# Patient Record
Sex: MALE | Race: WHITE | NOT HISPANIC OR LATINO
[De-identification: names, ages, dates, MRNs, and addresses within clinical notes are randomized per-mention and may not be internally consistent; named-entity substitution may affect disease eponyms.]

---

## 2017-03-02 ENCOUNTER — APPOINTMENT (OUTPATIENT)
Dept: VASCULAR SURGERY | Facility: CLINIC | Age: 67
End: 2017-03-02

## 2017-12-05 ENCOUNTER — INPATIENT (INPATIENT)
Facility: HOSPITAL | Age: 67
LOS: 3 days | Discharge: HOME | DRG: 242 | End: 2017-12-09
Attending: INTERNAL MEDICINE | Admitting: INTERNAL MEDICINE
Payer: COMMERCIAL

## 2017-12-05 VITALS
WEIGHT: 177.91 LBS | HEIGHT: 69 IN | RESPIRATION RATE: 18 BRPM | SYSTOLIC BLOOD PRESSURE: 143 MMHG | DIASTOLIC BLOOD PRESSURE: 62 MMHG | TEMPERATURE: 98 F | HEART RATE: 38 BPM | OXYGEN SATURATION: 95 %

## 2017-12-05 DIAGNOSIS — Z29.9 ENCOUNTER FOR PROPHYLACTIC MEASURES, UNSPECIFIED: ICD-10-CM

## 2017-12-05 DIAGNOSIS — I44.2 ATRIOVENTRICULAR BLOCK, COMPLETE: ICD-10-CM

## 2017-12-05 DIAGNOSIS — N40.0 BENIGN PROSTATIC HYPERPLASIA WITHOUT LOWER URINARY TRACT SYMPTOMS: ICD-10-CM

## 2017-12-05 DIAGNOSIS — Z98.89 OTHER SPECIFIED POSTPROCEDURAL STATES: Chronic | ICD-10-CM

## 2017-12-05 DIAGNOSIS — R63.8 OTHER SYMPTOMS AND SIGNS CONCERNING FOOD AND FLUID INTAKE: ICD-10-CM

## 2017-12-05 LAB
ALBUMIN SERPL ELPH-MCNC: 4.7 G/DL — SIGNIFICANT CHANGE UP (ref 3.3–5)
ALP SERPL-CCNC: 62 U/L — SIGNIFICANT CHANGE UP (ref 40–120)
ALT FLD-CCNC: 58 U/L — HIGH (ref 10–45)
ANION GAP SERPL CALC-SCNC: 12 MMOL/L — SIGNIFICANT CHANGE UP (ref 5–17)
APTT BLD: 28.5 SEC — SIGNIFICANT CHANGE UP (ref 27.5–37.4)
AST SERPL-CCNC: 34 U/L — SIGNIFICANT CHANGE UP (ref 10–40)
BASOPHILS NFR BLD AUTO: 0.2 % — SIGNIFICANT CHANGE UP (ref 0–2)
BILIRUB SERPL-MCNC: 0.8 MG/DL — SIGNIFICANT CHANGE UP (ref 0.2–1.2)
BUN SERPL-MCNC: 21 MG/DL — SIGNIFICANT CHANGE UP (ref 7–23)
CALCIUM SERPL-MCNC: 9.3 MG/DL — SIGNIFICANT CHANGE UP (ref 8.4–10.5)
CHLORIDE SERPL-SCNC: 104 MMOL/L — SIGNIFICANT CHANGE UP (ref 96–108)
CK MB CFR SERPL CALC: 2 NG/ML — SIGNIFICANT CHANGE UP (ref 0–6.7)
CK SERPL-CCNC: 107 U/L — SIGNIFICANT CHANGE UP (ref 30–200)
CO2 SERPL-SCNC: 25 MMOL/L — SIGNIFICANT CHANGE UP (ref 22–31)
CREAT SERPL-MCNC: 0.99 MG/DL — SIGNIFICANT CHANGE UP (ref 0.5–1.3)
EOSINOPHIL NFR BLD AUTO: 1.3 % — SIGNIFICANT CHANGE UP (ref 0–6)
GLUCOSE SERPL-MCNC: 133 MG/DL — HIGH (ref 70–99)
HCT VFR BLD CALC: 45.2 % — SIGNIFICANT CHANGE UP (ref 39–50)
HGB BLD-MCNC: 15.3 G/DL — SIGNIFICANT CHANGE UP (ref 13–17)
INR BLD: 1.11 — SIGNIFICANT CHANGE UP (ref 0.88–1.16)
LYMPHOCYTES # BLD AUTO: 15 % — SIGNIFICANT CHANGE UP (ref 13–44)
MAGNESIUM SERPL-MCNC: 1.8 MG/DL — SIGNIFICANT CHANGE UP (ref 1.6–2.6)
MCHC RBC-ENTMCNC: 31.6 PG — SIGNIFICANT CHANGE UP (ref 27–34)
MCHC RBC-ENTMCNC: 33.8 G/DL — SIGNIFICANT CHANGE UP (ref 32–36)
MCV RBC AUTO: 93.4 FL — SIGNIFICANT CHANGE UP (ref 80–100)
MONOCYTES NFR BLD AUTO: 8 % — SIGNIFICANT CHANGE UP (ref 2–14)
NEUTROPHILS NFR BLD AUTO: 75.5 % — SIGNIFICANT CHANGE UP (ref 43–77)
PCP SPEC-MCNC: SIGNIFICANT CHANGE UP
PLATELET # BLD AUTO: 120 K/UL — LOW (ref 150–400)
POTASSIUM SERPL-MCNC: 4 MMOL/L — SIGNIFICANT CHANGE UP (ref 3.5–5.3)
POTASSIUM SERPL-SCNC: 4 MMOL/L — SIGNIFICANT CHANGE UP (ref 3.5–5.3)
PROT SERPL-MCNC: 7.6 G/DL — SIGNIFICANT CHANGE UP (ref 6–8.3)
PROTHROM AB SERPL-ACNC: 12.3 SEC — SIGNIFICANT CHANGE UP (ref 9.8–12.7)
RBC # BLD: 4.84 M/UL — SIGNIFICANT CHANGE UP (ref 4.2–5.8)
RBC # FLD: 13.5 % — SIGNIFICANT CHANGE UP (ref 10.3–16.9)
SODIUM SERPL-SCNC: 141 MMOL/L — SIGNIFICANT CHANGE UP (ref 135–145)
TROPONIN T SERPL-MCNC: <0.01 NG/ML — SIGNIFICANT CHANGE UP (ref 0–0.01)
TSH SERPL-MCNC: 1.85 UIU/ML — SIGNIFICANT CHANGE UP (ref 0.35–4.94)
WBC # BLD: 8.9 K/UL — SIGNIFICANT CHANGE UP (ref 3.8–10.5)
WBC # FLD AUTO: 8.9 K/UL — SIGNIFICANT CHANGE UP (ref 3.8–10.5)

## 2017-12-05 PROCEDURE — 71250 CT THORAX DX C-: CPT | Mod: 26

## 2017-12-05 PROCEDURE — 93010 ELECTROCARDIOGRAM REPORT: CPT

## 2017-12-05 PROCEDURE — 71010: CPT | Mod: 26

## 2017-12-05 PROCEDURE — 99291 CRITICAL CARE FIRST HOUR: CPT | Mod: 25

## 2017-12-05 PROCEDURE — 99291 CRITICAL CARE FIRST HOUR: CPT

## 2017-12-05 PROCEDURE — 70490 CT SOFT TISSUE NECK W/O DYE: CPT | Mod: 26

## 2017-12-05 RX ORDER — ACETAMINOPHEN 500 MG
650 TABLET ORAL EVERY 6 HOURS
Qty: 0 | Refills: 0 | Status: DISCONTINUED | OUTPATIENT
Start: 2017-12-05 | End: 2017-12-09

## 2017-12-05 RX ORDER — DOPAMINE HYDROCHLORIDE 40 MG/ML
5 INJECTION, SOLUTION, CONCENTRATE INTRAVENOUS
Qty: 400 | Refills: 0 | Status: DISCONTINUED | OUTPATIENT
Start: 2017-12-05 | End: 2017-12-05

## 2017-12-05 RX ORDER — HEPARIN SODIUM 5000 [USP'U]/ML
5000 INJECTION INTRAVENOUS; SUBCUTANEOUS EVERY 8 HOURS
Qty: 0 | Refills: 0 | Status: DISCONTINUED | OUTPATIENT
Start: 2017-12-05 | End: 2017-12-06

## 2017-12-05 RX ORDER — ATROPINE SULFATE 0.1 MG/ML
0.5 SYRINGE (ML) INJECTION ONCE
Qty: 0 | Refills: 0 | Status: DISCONTINUED | OUTPATIENT
Start: 2017-12-05 | End: 2017-12-05

## 2017-12-05 RX ADMIN — Medication 0.5 MILLIGRAM(S): at 18:41

## 2017-12-05 RX ADMIN — HEPARIN SODIUM 5000 UNIT(S): 5000 INJECTION INTRAVENOUS; SUBCUTANEOUS at 21:37

## 2017-12-05 RX ADMIN — DOPAMINE HYDROCHLORIDE 15.13 MICROGRAM(S)/KG/MIN: 40 INJECTION, SOLUTION, CONCENTRATE INTRAVENOUS at 17:26

## 2017-12-05 RX ADMIN — Medication 0.5 MILLIGRAM(S): at 21:36

## 2017-12-05 NOTE — PROCEDURE NOTE - ADDITIONAL PROCEDURE DETAILS
Post procedure, Pacing temporarily turned off, no evidence of escape rhythm. Patient had transient (<10 secs) pause, capture achieved with pacing immediately, mild hypoxia and diaphoresis. NRBM placed, Symptoms and hemodynamics improved in short duration. CXR and chest CT non contrast done, confirmed no PTX. Care transferred to night float team.

## 2017-12-05 NOTE — H&P ADULT - ATTENDING COMMENTS
See CCU resident admission note for additional details   67 year old male with Osteoarthritis, HCV s/p therapy with no active disease and Cirrhosis who presents with subacute dyspnea, fatigue, dizziness with acute worsening in past 24 hours. Pt found to be in CHB on intake EKG with low junctional escape in 30s. Patient admitted to CCU for further management.   Vitals, labs and imaging reviewed including serial EKGs 12/5  TVP placed  Pace at VVI 80  Formal TTE  TFTs order, no lyme risk factors, not on padma agents  EP consulted for eval for PPM  Pt to remain in CCU for transvenous pacing while awaiting PPM placement

## 2017-12-05 NOTE — H&P ADULT - PROBLEM SELECTOR PLAN 2
-No IVF indicated  -Will replete to K>4 and Mg>2  -  -Full Code  -[facility] -HSQ  -No GI prophylaxis

## 2017-12-05 NOTE — H&P ADULT - PROBLEM SELECTOR PLAN 3
-No IVF indicated  -Will replete to K>4 and Mg>2  -  -Full Code  -[facility] -No IVF indicated  -Will replete to K>4 and Mg>2  -DASH/TLC, NPO after midnight for PPM treatment  -Full Code  -[facility]po [facility]

## 2017-12-05 NOTE — H&P ADULT - NSHPLABSRESULTS_GEN_ALL_CORE
15.3   8.9   )-----------( 120      ( 05 Dec 2017 17:01 )             45.2     12-05    141  |  104  |  21  ----------------------------<  133<H>  4.0   |  25  |  0.99    Ca    9.3      05 Dec 2017 17:01  Mg     1.8     12-05    TPro  7.6  /  Alb  4.7  /  TBili  0.8  /  DBili  x   /  AST  34  /  ALT  58<H>  /  AlkPhos  62  12-05    LIVER FUNCTIONS - ( 05 Dec 2017 17:01 )  Alb: 4.7 g/dL / Pro: 7.6 g/dL / ALK PHOS: 62 U/L / ALT: 58 U/L / AST: 34 U/L / GGT: x           PT/INR - ( 05 Dec 2017 17:01 )   PT: 12.3 sec;   INR: 1.11          PTT - ( 05 Dec 2017 17:01 )  PTT:28.5 sec    CARDIAC MARKERS ( 05 Dec 2017 17:01 )  x     / <0.01 ng/mL / 107 U/L / x     / 2.0 ng/mL

## 2017-12-05 NOTE — H&P ADULT - NSHPPHYSICALEXAM_GEN_ALL_CORE
Constitutional: WDWN resting comfortably in bed; NAD  Head: NC/AT  Eyes: PERRL, EOMI, anicteric sclera  ENT: no nasal discharge; uvula midline, no oropharyngeal erythema or exudates; MMM  Neck: supple; no JVD or thyromegaly  Respiratory: CTA B/L; no W/R/R, no retractions  Cardiac: +S1/S2; RRR; no M/R/G; PMI non-displaced  Gastrointestinal: soft, NT/ND; no rebound or guarding; +BSx4  Extremities: WWP, no clubbing or cyanosis; no peripheral edema  Musculoskeletal: NROM x4; no joint swelling, tenderness or erythema  Vascular: 2+ radial, femoral, DP/PT pulses B/L  Neurologic: AAOx3; CNII-XII grossly intact; no focal deficits Constitutional: WDWN resting comfortably in bed; NAD  Head: NC/AT  Eyes: PERRL, EOMI, anicteric sclera  ENT: no nasal discharge; absence of uvula, no oropharyngeal erythema or exudates; MMM  Neck: supple; no JVD or thyromegaly  Respiratory: Patient rhonchorous throughout lung fields  Cardiac: +S1/S2; compelte heart block, no M/R/G  Gastrointestinal: soft, NT/ND; no rebound or guarding; +BSx4  Extremities: WWP, no clubbing or cyanosis; no peripheral edema  Musculoskeletal: NROM x4; no joint swelling, tenderness or erythema  Vascular: 2+ radial, femoral, DP/PT pulses B/L  Neurologic: AAOx3; CNII-XII grossly intact; no focal deficits

## 2017-12-05 NOTE — CONSULT NOTE ADULT - ASSESSMENT
66yo M with high degree AV block    - Admit to CCU  - Place TVP for high degree AV block  - NPO after midnight for possible PPM insertion on Weds 12/6  - Echo, labs, lytes, TSH, HbA1c  - Collateral info from Dr. Alvarado  - D/c Dopamine drip once TVP is placed  - EP will continue to follow 66yo M with non-contributory medical history found to be in complete heart block. Will be admitted to CCU.     - Admit to CCU  - Place TVP for CHB  - NPO after midnight for possible PPM insertion on Weds 12/6  - Echo, labs, lytes, TSH, HbA1c  - Collateral info from Dr. Alvarado  - D/c Dopamine drip once TVP is placed  - EP will continue to follow 67o M with PMH of arthritis, Hep C (resolved), and long-standing productive cough who presented to [facility] ED today with symptoms of palpitations, dizziness, near syncope. Patient was found to be in complete heart block with wide junctional escape, HR 35-40bpm.    - Admit to CCU  - Place TVP  - D/c Dopamine drip once TVP is placed  - NPO after midnight for PPM insertion on Weds 12/6  - Echocardiogram, labs, lytes, TSH, HbA1c  - Collateral info from Dr. Alvarado   - EP will continue to follow

## 2017-12-05 NOTE — H&P ADULT - ASSESSMENT
Patient is a 67 year old male with PMH Patient is a 67 year old male with PMH arthritis, hepatitis C (patient unaware of reason for infection) with residual cirrhosis and long standing productive cough with copious sputum production releived by mucinex who presents to [facility] for irregular heartbeat found to be in complete heart block.

## 2017-12-05 NOTE — ED PROVIDER NOTE - OBJECTIVE STATEMENT
66 yo , reports intermittent lightheaded episodes for a few months, Pt with 2 episodes of palpitations and the sensation of a rapid heart rate over the last 2 days. Patient sent by pcp to cardiologist today, found to have a high grade block, Patient sent here for admission with ultimate pacemaker, no chest pain, occasional mild SOB, no fever, no cough, no abd pain, no n/v/d/

## 2017-12-05 NOTE — ED PROVIDER NOTE - MEDICAL DECISION MAKING DETAILS
66 yo with high grade AV block, will need emergent pacemaker, held on atropine / pacing as pt asymptomatic at rest and nl B.P. , cards saw pt in ED and recommended initiating dopamine, sent stat to CCU for intervention.

## 2017-12-05 NOTE — H&P ADULT - NSHPREVIEWOFSYSTEMS_GEN_ALL_CORE
CONSTITUTIONAL: No weakness, fevers or chills  EYES/ENT: No visual changes;  No vertigo or throat pain   NECK: No pain or stiffness  RESPIRATORY: No cough, wheezing, hemoptysis; No shortness of breath  CARDIOVASCULAR: No chest pain or palpitations  GASTROINTESTINAL: No abdominal or epigastric pain. No nausea, vomiting, or hematemesis; No diarrhea or constipation. No melena or hematochezia.  GENITOURINARY: No dysuria, frequency or hematuria  NEUROLOGICAL: No numbness or weakness  SKIN: No itching, burning, rashes, or lesions   All other review of systems is negative unless indicated above. CONSTITUTIONAL: No weakness, fevers or chills, admits to light headedness and dizziness  EYES/ENT: No visual changes;  No vertigo or throat pain   NECK: No pain or stiffness  RESPIRATORY: Admits to productive cough and shortness of breath, denies wheezing, hemoptysis  CARDIOVASCULAR: Denies chest pain admits to palpitations  GASTROINTESTINAL: No abdominal or epigastric pain. No nausea, vomiting, or hematemesis; No diarrhea or constipation. No melena or hematochezia.  GENITOURINARY: No dysuria, frequency or hematuria  NEUROLOGICAL: No numbness or weakness  SKIN: No itching, burning, rashes, or lesions

## 2017-12-05 NOTE — ED ADULT NURSE NOTE - OBJECTIVE STATEMENT
Pt c/o palpitations earlier while tying his shoes, states he contacted his doctor and saw Dr. Manrique and sent to ED. Pt states he had a similar episode months ago and saw a doctor with no diagnosis. Pt denies chest pain, SOB. Pt ambulating steadily, A&Ox3, speaking clearly, in no distress. Pt denies any significant medical problems, not on any daily medications. Pt on cardiac monitor and pacer pads via portable monitor on bed. Continuing to monitor.

## 2017-12-05 NOTE — CONSULT NOTE ADULT - SUBJECTIVE AND OBJECTIVE BOX
HPI: Patient is 67o M with PMH of arthritis, Hep C (resolved, s/p treatment), and long-standing productive cough with copious mucus (relieved by Mucinex). Patient endorses intermittent dizziness and near syncope over the past several months. Patient was in his usual state of health until yesterday when he experienced palpitations, severe dizziness, near syncope, SOB, and JUAREZ. Patient presented to Dr. Manrique's office where EKG showed 2:1 AV block with HR 35-40bpm. Dr. Manrique sent patient to the [facility] ER. EP was consulted for AV block and possible pacemaker.     Patient denies family history of cancer, heart disease, or sudden cardiac death.     In ED: /66. HR 35-40bpm. EKG demonstrated High grade AV block with wide junctional escape (> 130ms). Patient endorses fatigue, "not feeling right", and dizziness/ lightheadedness.     PAST MEDICAL & SURGICAL HISTORY:  Nephrolithiasis  S/P right knee surgery  s/p shoulder arthroscope  s/p Uvula removal 30 years ago          Social History: Patient is   Smoking: Former social smoker, no current   Drugs: Denies current drug use  ETOH: Occasional social Etoh, (beer and wine)    pertinent home medications:    Inpatient Medications:   DOPamine Infusion 5 MICROgram(s)/kG/Min IV Continuous <Continuous>      penicillin (Other)  predniSONE (Rash)      ROS:   CONSTITUTIONAL: No fever, weight loss + fatigue  EYES: Pt denies  RESPIRATORY: + productive cough with copious mucus (long-standing, not a new cough). Denies wheezing, chills or hemoptysis;  CARDIOVASCULAR: +JUAREZ. see HPI  GASTROINTESTINAL: Pt denies  NEUROLOGICAL: Pt denies  SKIN: Pt denies   PSYCHIATRIC: Pt denies  HEME/LYMPH: Pt denies    PHYSICAL:  T(C): 36.6 (12-05-17 @ 16:19), Max: 36.6 (12-05-17 @ 16:19)  HR: 38 (12-05-17 @ 16:19) (38 - 38)  BP: 143/62 (12-05-17 @ 16:19) (143/62 - 143/62)  RR: 18 (12-05-17 @ 16:19) (18 - 18)  SpO2: 95% (12-05-17 @ 16:19) (95% - 95%)  Daily Height in cm: 175.26 (05 Dec 2017 16:19)      Appearance: NAD, pleasant, conversant  Cardiovascular: High degree AV block, Normal S1 S2, No JVD, No murmurs, No edema  Respiratory: Lungs clear to auscultation bilaterally.  No wheeze, rhonchi, rales  Gastrointestinal:  Soft, NT/ND, + BS	  Neurologic: A&O x3, No deficit noted  Extremities: WWP, No edema, pulses intact      LABS:                        15.3   8.9   )-----------( 120      ( 05 Dec 2017 17:01 )             45.2     PT/INR - ( 05 Dec 2017 17:01 )   PT: 12.3 sec;   INR: 1.11          PTT - ( 05 Dec 2017 17:01 )  PTT:28.5 sec            EKG: High degree AV block with wide junctional escape, HR 37    Telemetry:  High degree AV block with wide junctional escape, HR 35-40bpm    ECHO: Bedside echo normal EF    Prior EP procedures: None    Assessment/ Plan: HPI: Patient is 67o M with PMH of arthritis, Hep C (resolved, s/p treatment), and long-standing productive cough with copious mucus (relieved by Mucinex). Patient endorses intermittent dizziness and near syncope over the past several months. Patient was in his usual state of health until yesterday when he experienced palpitations, severe dizziness, near syncope, SOB, and JUAREZ. Patient presented to Dr. Manrique's office where EKG showed complete heart block, HR 35-40bpm. Dr. Manrique sent patient to the [facility] ER. EP was consulted for AV block and possible pacemaker.     Patient denies family history of cancer, heart disease, or sudden cardiac death.     In ED: /66. HR 35-40bpm. EKG demonstrated CHB with wide junctional escape (> 130ms). Patient endorses fatigue, "not feeling right", and dizziness/ lightheadedness.     PAST MEDICAL & SURGICAL HISTORY:  Nephrolithiasis  S/P right knee surgery  s/p shoulder arthroscope  s/p Uvula removal 30 years ago          Social History: Patient is   Smoking: Former social smoker, no current   Drugs: Denies current drug use  ETOH: Occasional social Etoh, (beer and wine)    pertinent home medications:    Inpatient Medications:   DOPamine Infusion 5 MICROgram(s)/kG/Min IV Continuous <Continuous>      penicillin (Other)  predniSONE (Rash)      ROS:   CONSTITUTIONAL: No fever, weight loss + fatigue  EYES: Pt denies  RESPIRATORY: + productive cough with copious mucus (long-standing, not a new cough). Denies wheezing, chills or hemoptysis;  CARDIOVASCULAR: +JUAREZ. see HPI  GASTROINTESTINAL: Pt denies  NEUROLOGICAL: Pt denies  SKIN: Pt denies   PSYCHIATRIC: Pt denies  HEME/LYMPH: Pt denies    PHYSICAL:  T(C): 36.6 (12-05-17 @ 16:19), Max: 36.6 (12-05-17 @ 16:19)  HR: 38 (12-05-17 @ 16:19) (38 - 38)  BP: 143/62 (12-05-17 @ 16:19) (143/62 - 143/62)  RR: 18 (12-05-17 @ 16:19) (18 - 18)  SpO2: 95% (12-05-17 @ 16:19) (95% - 95%)  Daily Height in cm: 175.26 (05 Dec 2017 16:19)      Appearance: NAD, pleasant, conversant  Cardiovascular: complete heart block, Normal S1 S2, No JVD, No murmurs, No edema  Respiratory: Lungs clear to auscultation bilaterally.  No wheeze, rhonchi, rales  Gastrointestinal:  Soft, NT/ND, + BS	  Neurologic: A&O x3, No deficit noted  Extremities: WWP, No edema, pulses intact      LABS:                        15.3   8.9   )-----------( 120      ( 05 Dec 2017 17:01 )             45.2     PT/INR - ( 05 Dec 2017 17:01 )   PT: 12.3 sec;   INR: 1.11          PTT - ( 05 Dec 2017 17:01 )  PTT:28.5 sec            EKG: High degree AV block with wide junctional escape, HR 37    Telemetry:  High degree AV block with wide junctional escape, HR 35-40bpm    ECHO: Bedside echo normal EF    Prior EP procedures: None    Assessment/ Plan: HPI: Patient is 67o M with PMH of arthritis, Hep C (resolved, s/p treatment), uvula removal (30yrs ago), and long-standing productive cough with copious mucus (relieved by Mucinex). Patient takes tumeric curcumin, no prescriptions medications. He endorses regular doctors visits with internist, GI, and cardiology. He endorses intermittent dizziness and near syncope over the past several months. Patient was in his usual state of health until yesterday when he began to experience palpitations, severe dizziness, near syncope, SOB, and JUAREZ. Patient presented to Dr. Manrique's office where EKG showed complete heart block, HR 35-40bpm. Dr. Manrique sent patient to the [facility] ER. EP was consulted for heart block and possible pacemaker.     Patient denies family history of cancer, heart disease, or sudden cardiac death. Patient endorses occasional use of PDE-5 inhibitors (most recently one week ago). Patient requested this not be discussed with anyone but him.     In ED: /66. HR 35-40bpm. O2: 95-99%. EKG demonstrated CHB with wide junctional escape (> 130ms). Patient endorses fatigue, "not feeling right", and dizziness/ lightheadedness.     PAST MEDICAL & SURGICAL HISTORY:  Nephrolithiasis  S/P right knee surgery  s/p shoulder arthroscope  s/p Uvula removal 30 years ago    Social History: Patient is   Smoking: Former social smoker, no current   Drugs: Denies current drug use  ETOH: Occasional social Etoh, (beer and wine)    pertinent home medications:    Inpatient Medications:   DOPamine Infusion 5 MICROgram(s)/kG/Min IV Continuous <Continuous>      penicillin (Other)  predniSONE (Rash)      ROS:   CONSTITUTIONAL: No fever, weight loss +fatigue  EYES: Pt denies  RESPIRATORY: +productive cough with copious mucus (long-standing, not a new cough). Denies wheezing, chills or hemoptysis;  CARDIOVASCULAR: +JUAREZ. see HPI  GASTROINTESTINAL: Pt denies  NEUROLOGICAL: Pt denies  SKIN: Pt denies   PSYCHIATRIC: Pt denies  HEME/LYMPH: Pt denies    PHYSICAL:  T(C): 36.6 (12-05-17 @ 16:19), Max: 36.6 (12-05-17 @ 16:19)  HR: 38 (12-05-17 @ 16:19) (38 - 38)  BP: 143/62 (12-05-17 @ 16:19) (143/62 - 143/62)  RR: 18 (12-05-17 @ 16:19) (18 - 18)  SpO2: 95% (12-05-17 @ 16:19) (95% - 95%)  Daily Height in cm: 175.26 (05 Dec 2017 16:19)      Appearance: NAD, pleasant, conversant  Cardiovascular: complete heart block, Normal S1 S2, No JVD, No murmurs, No edema  Respiratory: Lungs clear to auscultation bilaterally.  No wheeze, rhonchi, rales  Gastrointestinal:  Soft, NT/ ND, + BS	  Neurologic: A&O x3, No deficit noted  Extremities: WWP, No edema, pulses intact      LABS:                        15.3   8.9   )-----------( 120      ( 05 Dec 2017 17:01 )             45.2     PT/INR - ( 05 Dec 2017 17:01 )   PT: 12.3 sec;   INR: 1.11     PTT - ( 05 Dec 2017 17:01 )  PTT:28.5 sec            EKG: High degree AV block with wide junctional escape, HR 37    Telemetry:  High degree AV block with wide junctional escape, HR 35-40bpm    ECHO: Bedside echo normal EF    Prior EP procedures: None    Assessment/ Plan: HPI: Patient is 67o M with PMH of arthritis, Hep C (resolved, s/p treatment), uvula removal (30yrs ago), and long-standing productive cough with copious mucus (relieved by Mucinex). Patient takes tumeric curcumin, no prescriptions medications. He endorses regular doctors visits with internist, GI, and cardiology. He endorses intermittent dizziness and near syncope over the past several months. Patient was in his usual state of health until yesterday when he began to experience palpitations, severe dizziness, near syncope, SOB, and JUAREZ. Patient presented to Dr. Manrique's office where EKG showed complete heart block, HR 35-40bpm. Dr. Manrique sent patient to the [facility] ER. EP was consulted for heart block and possible pacemaker.     Patient denies family history of cancer, heart disease, or sudden cardiac death. Patient endorses occasional use of PDE-5 inhibitors (most recently one week ago). Patient requested this not be discussed with anyone but him.     In ED: /68. HR 35-40bpm. O2: 95-99%. EKG demonstrated CHB with wide junctional escape (> 130ms). Patient endorses fatigue, "not feeling right", and dizziness/ lightheadedness.     PAST MEDICAL & SURGICAL HISTORY:  Nephrolithiasis  S/P right knee surgery  s/p shoulder arthroscope  s/p Uvula removal 30 years ago    Social History: Patient is   Smoking: Former social smoker, no current   Drugs: Denies current drug use  ETOH: Occasional social Etoh, (beer and wine)    pertinent home medications:    Inpatient Medications:   DOPamine Infusion 5 MICROgram(s)/kG/Min IV Continuous <Continuous>      penicillin (Other)  predniSONE (Rash)      ROS:   CONSTITUTIONAL: No fever, weight loss +fatigue  EYES: Pt denies  RESPIRATORY: +productive cough with copious mucus (long-standing, not a new cough). Denies wheezing, chills or hemoptysis;  CARDIOVASCULAR: +JUAREZ. see HPI  GASTROINTESTINAL: Pt denies  NEUROLOGICAL: Pt denies  SKIN: Pt denies   PSYCHIATRIC: Pt denies  HEME/LYMPH: Pt denies    PHYSICAL:  T(C): 36.6 (12-05-17 @ 16:19), Max: 36.6 (12-05-17 @ 16:19)  HR: 38 (12-05-17 @ 16:19) (38 - 38)  BP: 143/62 (12-05-17 @ 16:19) (143/62 - 143/62)  RR: 18 (12-05-17 @ 16:19) (18 - 18)  SpO2: 95% (12-05-17 @ 16:19) (95% - 95%)  Daily Height in cm: 175.26 (05 Dec 2017 16:19)      Appearance: NAD, pleasant, conversant  Cardiovascular: complete heart block, Normal S1 S2, No JVD, No murmurs, No edema  Respiratory: Lungs clear to auscultation bilaterally.  No wheeze, rhonchi, rales  Gastrointestinal:  Soft, NT/ ND, + BS	  Neurologic: A&O x3, No deficit noted  Extremities: WWP, No edema, pulses intact      LABS:                        15.3   8.9   )-----------( 120      ( 05 Dec 2017 17:01 )             45.2     PT/INR - ( 05 Dec 2017 17:01 )   PT: 12.3 sec;   INR: 1.11     PTT - ( 05 Dec 2017 17:01 )  PTT:28.5 sec            EKG: High degree AV block with wide junctional escape, HR 37    Telemetry: CHB with wide junctional escape, HR 35-40bpm    ECHO: Bedside echo normal EF    Prior EP procedures: None    Assessment/ Plan: HPI: Patient is 67o M with PMH of arthritis, Hep C (resolved, s/p treatment), uvula removal (30yrs ago), and long-standing productive cough with copious mucus (relieved by Mucinex). Patient takes tumeric curcumin, no prescriptions medications. He endorses regular doctors visits with internist, GI, and cardiology. He endorses intermittent dizziness and near syncope over the past several months. Patient was in his usual state of health until yesterday when he began to experience palpitations, severe dizziness, near syncope, SOB, and JUAREZ. Patient presented to Dr. Manrique's office where EKG showed complete heart block, HR 35-40bpm. Dr. Manrique sent patient to the [facility] ER. EP was consulted for heart block and possible pacemaker.     Patient denies family history of cancer, heart disease, or sudden cardiac death. Patient endorses occasional use of PDE-5 inhibitors (most recently one week ago). Patient requested this not be discussed with anyone but him.     In ED: /68. HR 35-40bpm. O2: 95-99%. EKG demonstrated CHB with wide junctional escape (> 130ms). Patient endorses fatigue, "not feeling right", and dizziness/ lightheadedness.     PAST MEDICAL & SURGICAL HISTORY:  Nephrolithiasis  S/P right knee surgery  s/p shoulder arthroscope  s/p Uvula removal 30 years ago    Social History: Patient is   Smoking: Former social smoker, no current   Drugs: Denies current drug use  ETOH: Occasional social Etoh, (beer and wine)    pertinent home medications:    Inpatient Medications:   DOPamine Infusion 5 MICROgram(s)/kG/Min IV Continuous <Continuous>      penicillin (Other)  predniSONE (Rash)      ROS:   CONSTITUTIONAL: No fever, weight loss +fatigue  EYES: Pt denies  RESPIRATORY: +productive cough with copious mucus (long-standing, not a new cough). Denies wheezing, chills or hemoptysis;  CARDIOVASCULAR: +JUAREZ. see HPI  GASTROINTESTINAL: Pt denies  NEUROLOGICAL: Pt denies  SKIN: Pt denies   PSYCHIATRIC: Pt denies  HEME/LYMPH: Pt denies    PHYSICAL:  T(C): 36.6 (12-05-17 @ 16:19), Max: 36.6 (12-05-17 @ 16:19)  HR: 38 (12-05-17 @ 16:19) (38 - 38)  BP: 143/62 (12-05-17 @ 16:19) (143/62 - 143/62)  RR: 18 (12-05-17 @ 16:19) (18 - 18)  SpO2: 95% (12-05-17 @ 16:19) (95% - 95%)  Daily Height in cm: 175.26 (05 Dec 2017 16:19)      Appearance: NAD, pleasant, conversant  Cardiovascular: complete heart block, Normal S1 S2, No JVD, No murmurs, No edema  Respiratory: Lungs clear to auscultation bilaterally.  No wheeze, rhonchi, rales  Gastrointestinal:  Soft, NT/ ND, + BS	  Neurologic: A&O x3, No deficit noted  Extremities: WWP, No edema, pulses intact      LABS:                        15.3   8.9   )-----------( 120      ( 05 Dec 2017 17:01 )             45.2     PT/INR - ( 05 Dec 2017 17:01 )   PT: 12.3 sec;   INR: 1.11     PTT - ( 05 Dec 2017 17:01 )  PTT:28.5 sec            EKG: CHB with wide junctional escape, HR 37    Telemetry: CHB with wide junctional escape, HR 35-40bpm    ECHO: Bedside echo normal EF    Prior EP procedures: None    Assessment/ Plan:

## 2017-12-05 NOTE — H&P ADULT - HISTORY OF PRESENT ILLNESS
Patient is a 67 year old male with PMH Patient is a 67 year old male with PMH who presents for irregular heartbeat.  He was at Dr. Manrique's office complaining of dyspnea on exertion and dizziness.    On arrival to [facility], his vitals signs were HR 38, /62, Temp 97.9 F and RR 18 and saturating 95%.  He was found to have heart block. Patient is a 67 year old male with PMH arthritis, hepatitis C (patient unaware of reason for infection) with residual cirrhosis and long standing productive cough with copious sputum production releived by mucinex who presents to [facility] for irregular heartbeat found to be in complete heart block.  The patient endorses 4-5 months of dizziness and near syncope feeling but no syncopal episodes.  He was in his usual state of health until yesterday when he started to have palpitations, severe dizziness, near syncope, shortness of breath and dyspnea on exertion.  Today he went today he was at Dr. Manrique's office for the first time and on EKG was found to have complete heart block with HRs in the 30s and 40s and was sent to the ED.  On arrival to [facility], his vitals signs were HR 38, /62, Temp 97.9 F and RR 18 and saturating 95%.  Only significant lab values were slight thrombocytopenia and slightly elevated AST.  He was found to have complete heart block on EKG and was admitted to CCU for further management.

## 2017-12-05 NOTE — H&P ADULT - PROBLEM SELECTOR PLAN 1
-Patient with a history of BPH for which he takes flomax 0.4 mg at home -Patient with 4-5 month history of dyspnea on exertion, palpitations, dizziness and lightheadedness found to be in complete heart block with HRs in the 30s-40s.  -EP consulted-place TVP with plan for PPM tomorrow  -Patient currently on dobutamine drip-will D/C once TVP placed  -NPO after midnight for PPM tomorrw  -Obtain echocardiogram  -Follow up TSH, HbA1C and lipid profile -Patient with 4-5 month history of dyspnea on exertion, palpitations, dizziness and lightheadedness found to be in complete heart block with HRs in the 30s-40s.  -EP consulted-place TVP with plan for PPM tomorrow  -Patient currently on dobutamine drip-will D/C once TVP placed  -NPO after midnight for PPM tomorrow  -Obtain echocardiogram  -Follow up TSH, HbA1C and lipid profile -Patient with 4-5 month history of dyspnea on exertion, palpitations, dizziness and lightheadedness found to be in complete heart block with HRs in the 30s-40s.  -EP consulted-place TVP with plan for PPM tomorrow  -Patient currently on dopamine drip-will D/C once TVP placed  -NPO after midnight for PPM tomorrow  -Obtain echocardiogram  -Follow up TSH, HbA1C and lipid profile

## 2017-12-06 DIAGNOSIS — J18.9 PNEUMONIA, UNSPECIFIED ORGANISM: ICD-10-CM

## 2017-12-06 DIAGNOSIS — D69.6 THROMBOCYTOPENIA, UNSPECIFIED: ICD-10-CM

## 2017-12-06 DIAGNOSIS — R68.89 OTHER GENERAL SYMPTOMS AND SIGNS: ICD-10-CM

## 2017-12-06 LAB
ANION GAP SERPL CALC-SCNC: 11 MMOL/L — SIGNIFICANT CHANGE UP (ref 5–17)
APPEARANCE UR: CLEAR — SIGNIFICANT CHANGE UP
APTT BLD: 21 SEC — LOW (ref 27.5–37.4)
B BURGDOR C6 AB SER-ACNC: NEGATIVE — SIGNIFICANT CHANGE UP
B BURGDOR IGG+IGM SER-ACNC: 0.41 INDEX — SIGNIFICANT CHANGE UP (ref 0.01–0.89)
BILIRUB UR-MCNC: NEGATIVE — SIGNIFICANT CHANGE UP
BLD GP AB SCN SERPL QL: NEGATIVE — SIGNIFICANT CHANGE UP
BUN SERPL-MCNC: 19 MG/DL — SIGNIFICANT CHANGE UP (ref 7–23)
CALCIUM SERPL-MCNC: 8.7 MG/DL — SIGNIFICANT CHANGE UP (ref 8.4–10.5)
CHLORIDE SERPL-SCNC: 103 MMOL/L — SIGNIFICANT CHANGE UP (ref 96–108)
CHOLEST SERPL-MCNC: 180 MG/DL — SIGNIFICANT CHANGE UP (ref 10–199)
CO2 SERPL-SCNC: 23 MMOL/L — SIGNIFICANT CHANGE UP (ref 22–31)
COLOR SPEC: YELLOW — SIGNIFICANT CHANGE UP
CREAT SERPL-MCNC: 0.98 MG/DL — SIGNIFICANT CHANGE UP (ref 0.5–1.3)
DIFF PNL FLD: (no result)
ENA SCL70 AB SER-ACNC: <0.2 AI — SIGNIFICANT CHANGE UP
GLUCOSE SERPL-MCNC: 122 MG/DL — HIGH (ref 70–99)
GLUCOSE UR QL: NEGATIVE — SIGNIFICANT CHANGE UP
HBA1C BLD-MCNC: 5.3 % — SIGNIFICANT CHANGE UP (ref 4–5.6)
HCT VFR BLD CALC: 42 % — SIGNIFICANT CHANGE UP (ref 39–50)
HDLC SERPL-MCNC: 34 MG/DL — LOW (ref 40–125)
HGB BLD-MCNC: 14 G/DL — SIGNIFICANT CHANGE UP (ref 13–17)
HIV 1+2 AB+HIV1 P24 AG SERPL QL IA: SIGNIFICANT CHANGE UP
INR BLD: 1.2 — HIGH (ref 0.88–1.16)
KETONES UR-MCNC: NEGATIVE — SIGNIFICANT CHANGE UP
LACTATE SERPL-SCNC: 1 MMOL/L — SIGNIFICANT CHANGE UP (ref 0.5–2)
LEGIONELLA AG UR QL: NEGATIVE — SIGNIFICANT CHANGE UP
LEUKOCYTE ESTERASE UR-ACNC: NEGATIVE — SIGNIFICANT CHANGE UP
LIPID PNL WITH DIRECT LDL SERPL: 110 MG/DL — SIGNIFICANT CHANGE UP
MAGNESIUM SERPL-MCNC: 1.6 MG/DL — SIGNIFICANT CHANGE UP (ref 1.6–2.6)
MCHC RBC-ENTMCNC: 31.2 PG — SIGNIFICANT CHANGE UP (ref 27–34)
MCHC RBC-ENTMCNC: 33.3 G/DL — SIGNIFICANT CHANGE UP (ref 32–36)
MCV RBC AUTO: 93.5 FL — SIGNIFICANT CHANGE UP (ref 80–100)
NITRITE UR-MCNC: NEGATIVE — SIGNIFICANT CHANGE UP
PH UR: 5.5 — SIGNIFICANT CHANGE UP (ref 5–8)
PLATELET # BLD AUTO: 85 K/UL — LOW (ref 150–400)
POTASSIUM SERPL-MCNC: 4 MMOL/L — SIGNIFICANT CHANGE UP (ref 3.5–5.3)
POTASSIUM SERPL-SCNC: 4 MMOL/L — SIGNIFICANT CHANGE UP (ref 3.5–5.3)
PROT UR-MCNC: NEGATIVE MG/DL — SIGNIFICANT CHANGE UP
PROTHROM AB SERPL-ACNC: 13.4 SEC — HIGH (ref 9.8–12.7)
RAPID RVP RESULT: SIGNIFICANT CHANGE UP
RBC # BLD: 4.49 M/UL — SIGNIFICANT CHANGE UP (ref 4.2–5.8)
RBC # FLD: 13.4 % — SIGNIFICANT CHANGE UP (ref 10.3–16.9)
RH IG SCN BLD-IMP: POSITIVE — SIGNIFICANT CHANGE UP
RHEUMATOID FACT SERPL-ACNC: <7 IU/ML — SIGNIFICANT CHANGE UP (ref 0–13.9)
SODIUM SERPL-SCNC: 137 MMOL/L — SIGNIFICANT CHANGE UP (ref 135–145)
SP GR SPEC: 1.01 — SIGNIFICANT CHANGE UP (ref 1–1.03)
T PALLIDUM AB TITR SER: NEGATIVE — SIGNIFICANT CHANGE UP
TOTAL CHOLESTEROL/HDL RATIO MEASUREMENT: 5.3 RATIO — SIGNIFICANT CHANGE UP (ref 3.4–9.6)
TRIGL SERPL-MCNC: 181 MG/DL — HIGH (ref 10–149)
UROBILINOGEN FLD QL: 0.2 E.U./DL — SIGNIFICANT CHANGE UP
WBC # BLD: 7.9 K/UL — SIGNIFICANT CHANGE UP (ref 3.8–10.5)
WBC # FLD AUTO: 7.9 K/UL — SIGNIFICANT CHANGE UP (ref 3.8–10.5)

## 2017-12-06 PROCEDURE — 71010: CPT | Mod: 26

## 2017-12-06 PROCEDURE — 93306 TTE W/DOPPLER COMPLETE: CPT | Mod: 26,76

## 2017-12-06 PROCEDURE — 99291 CRITICAL CARE FIRST HOUR: CPT

## 2017-12-06 RX ORDER — LANOLIN ALCOHOL/MO/W.PET/CERES
5 CREAM (GRAM) TOPICAL AT BEDTIME
Qty: 0 | Refills: 0 | Status: DISCONTINUED | OUTPATIENT
Start: 2017-12-06 | End: 2017-12-09

## 2017-12-06 RX ORDER — SODIUM CHLORIDE 9 MG/ML
500 INJECTION INTRAMUSCULAR; INTRAVENOUS; SUBCUTANEOUS ONCE
Qty: 0 | Refills: 0 | Status: COMPLETED | OUTPATIENT
Start: 2017-12-06 | End: 2017-12-06

## 2017-12-06 RX ORDER — VANCOMYCIN HCL 1 G
VIAL (EA) INTRAVENOUS
Qty: 0 | Refills: 0 | Status: DISCONTINUED | OUTPATIENT
Start: 2017-12-06 | End: 2017-12-07

## 2017-12-06 RX ORDER — PIPERACILLIN AND TAZOBACTAM 4; .5 G/20ML; G/20ML
4.5 INJECTION, POWDER, LYOPHILIZED, FOR SOLUTION INTRAVENOUS EVERY 8 HOURS
Qty: 0 | Refills: 0 | Status: DISCONTINUED | OUTPATIENT
Start: 2017-12-06 | End: 2017-12-06

## 2017-12-06 RX ORDER — MAGNESIUM SULFATE 500 MG/ML
2 VIAL (ML) INJECTION ONCE
Qty: 0 | Refills: 0 | Status: COMPLETED | OUTPATIENT
Start: 2017-12-06 | End: 2017-12-06

## 2017-12-06 RX ORDER — OXYCODONE AND ACETAMINOPHEN 5; 325 MG/1; MG/1
1 TABLET ORAL ONCE
Qty: 0 | Refills: 0 | Status: DISCONTINUED | OUTPATIENT
Start: 2017-12-06 | End: 2017-12-06

## 2017-12-06 RX ORDER — HEPARIN SODIUM 5000 [USP'U]/ML
5000 INJECTION INTRAVENOUS; SUBCUTANEOUS EVERY 8 HOURS
Qty: 0 | Refills: 0 | Status: DISCONTINUED | OUTPATIENT
Start: 2017-12-06 | End: 2017-12-09

## 2017-12-06 RX ORDER — VANCOMYCIN HCL 1 G
1250 VIAL (EA) INTRAVENOUS ONCE
Qty: 0 | Refills: 0 | Status: COMPLETED | OUTPATIENT
Start: 2017-12-06 | End: 2017-12-06

## 2017-12-06 RX ORDER — VANCOMYCIN HCL 1 G
1250 VIAL (EA) INTRAVENOUS EVERY 12 HOURS
Qty: 0 | Refills: 0 | Status: DISCONTINUED | OUTPATIENT
Start: 2017-12-06 | End: 2017-12-07

## 2017-12-06 RX ORDER — MAGNESIUM SULFATE 500 MG/ML
1 VIAL (ML) INJECTION ONCE
Qty: 0 | Refills: 0 | Status: DISCONTINUED | OUTPATIENT
Start: 2017-12-06 | End: 2017-12-06

## 2017-12-06 RX ADMIN — HEPARIN SODIUM 5000 UNIT(S): 5000 INJECTION INTRAVENOUS; SUBCUTANEOUS at 21:02

## 2017-12-06 RX ADMIN — Medication 5 MILLIGRAM(S): at 03:14

## 2017-12-06 RX ADMIN — SODIUM CHLORIDE 500 MILLILITER(S): 9 INJECTION INTRAMUSCULAR; INTRAVENOUS; SUBCUTANEOUS at 14:14

## 2017-12-06 RX ADMIN — Medication 50 GRAM(S): at 06:51

## 2017-12-06 RX ADMIN — Medication 166.67 MILLIGRAM(S): at 21:02

## 2017-12-06 RX ADMIN — Medication 166.67 MILLIGRAM(S): at 10:17

## 2017-12-06 RX ADMIN — OXYCODONE AND ACETAMINOPHEN 1 TABLET(S): 5; 325 TABLET ORAL at 23:41

## 2017-12-06 RX ADMIN — Medication 5 MILLIGRAM(S): at 22:41

## 2017-12-06 RX ADMIN — HEPARIN SODIUM 5000 UNIT(S): 5000 INJECTION INTRAVENOUS; SUBCUTANEOUS at 14:12

## 2017-12-06 RX ADMIN — SODIUM CHLORIDE 1000 MILLILITER(S): 9 INJECTION INTRAMUSCULAR; INTRAVENOUS; SUBCUTANEOUS at 01:05

## 2017-12-06 RX ADMIN — Medication 650 MILLIGRAM(S): at 00:14

## 2017-12-06 NOTE — PROGRESS NOTE ADULT - PROBLEM SELECTOR PLAN 3
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

## 2017-12-06 NOTE — PROGRESS NOTE ADULT - SUBJECTIVE AND OBJECTIVE BOX
O/N Events:  Subjective:    VITALS  Vital Signs Last 24 Hrs  T(C): 36.7 (06 Dec 2017 07:19), Max: 38.8 (05 Dec 2017 23:13)  T(F): 98 (06 Dec 2017 07:19), Max: 101.8 (05 Dec 2017 23:13)  HR: 78 (06 Dec 2017 07:00) (37 - 78)  BP: 112/69 (06 Dec 2017 07:00) (90/67 - 167/73)  BP(mean): 82 (06 Dec 2017 07:00) (74 - 110)  RR: 24 (06 Dec 2017 07:00) (16 - 37)  SpO2: 98% (06 Dec 2017 07:00) (89% - 98%)    I&O's Summary    05 Dec 2017 07:01  -  06 Dec 2017 07:00  --------------------------------------------------------  IN: 22.5 mL / OUT: 1210 mL / NET: -1187.5 mL        CAPILLARY BLOOD GLUCOSE          PHYSICAL EXAM  General: A&Ox 3; NAD  Head: NC/AT;   Eyes: PERRL; EOMI; anicteric sclera  Neck: Supple; no JVD  Respiratory: CTA B/L; no wheezes/crackles/rales auscultated w/ good air movement  Cardiovascular: Regular rhythm/rate; S1/S2; no gallops or murmurs auscultated  Gastrointestinal: Soft; NTND w/out rebound tenderness or guarding; bowel sounds normal  Extremities: WWP; no edema or cyanosis; radial/pedal pulses palpable  Neurological:  CNII-XII grossly intact; no obvious focal deficits    MEDICATIONS  (STANDING):  levoFLOXacin IVPB 750 milliGRAM(s) IV Intermittent every 24 hours  levoFLOXacin IVPB      melatonin 5 milliGRAM(s) Oral at bedtime    MEDICATIONS  (PRN):  acetaminophen   Tablet 650 milliGRAM(s) Oral every 6 hours PRN For Temp greater than 38 C (100.4 F)      LABS                        14.0   7.9   )-----------( 85       ( 06 Dec 2017 03:17 )             42.0     12-06    137  |  103  |  19  ----------------------------<  122<H>  4.0   |  23  |  0.98    Ca    8.7      06 Dec 2017 03:17  Mg     1.6         TPro  7.6  /  Alb  4.7  /  TBili  0.8  /  DBili  x   /  AST  34  /  ALT  58<H>  /  AlkPhos  62  12    LIVER FUNCTIONS - ( 05 Dec 2017 17:01 )  Alb: 4.7 g/dL / Pro: 7.6 g/dL / ALK PHOS: 62 U/L / ALT: 58 U/L / AST: 34 U/L / GGT: x           PT/INR - ( 06 Dec 2017 03:17 )   PT: 13.4 sec;   INR: 1.20          PTT - ( 06 Dec 2017 03:17 )  PTT:21.0 sec  Urinalysis Basic - ( 05 Dec 2017 23:34 )    Color: Yellow / Appearance: Clear / S.015 / pH: x  Gluc: x / Ketone: NEGATIVE  / Bili: Negative / Urobili: 0.2 E.U./dL   Blood: x / Protein: NEGATIVE mg/dL / Nitrite: NEGATIVE   Leuk Esterase: NEGATIVE / RBC: < 5 /HPF / WBC < 5 /HPF   Sq Epi: x / Non Sq Epi: 0-5 /HPF / Bacteria: Present /HPF      CARDIAC MARKERS ( 05 Dec 2017 17:01 )  x     / <0.01 ng/mL / 107 U/L / x     / 2.0 ng/mL        IMAGING/EKG/ETC  EKG:   Xray:  CT: O/N Events:  Overnight the patient's TVP was placed.  After the procedure the began to have cough and rigors.  There was concern for pneumothorax but there was no evidence on xray or CT scan.  He spiked a fever to 101.8 with dysuria.  UA and RVP was negative and urine and blood cultures were sent.  He was started on levaquin 750mg.  A 500cc bolus was given because IVC was visualized to be <2.1.   Subjective:  Patient seen and examined at bedside, feels well this morning.  Denies chest pain, shortness of breath, dizziness, lightheadedness and palpitations.    VITALS  Vital Signs Last 24 Hrs  T(C): 36.7 (06 Dec 2017 07:19), Max: 38.8 (05 Dec 2017 23:13)  T(F): 98 (06 Dec 2017 07:19), Max: 101.8 (05 Dec 2017 23:13)  HR: 78 (06 Dec 2017 07:00) (37 - 78)  BP: 112/69 (06 Dec 2017 07:00) (90/67 - 167/73)  BP(mean): 82 (06 Dec 2017 07:00) (74 - 110)  RR: 24 (06 Dec 2017 07:00) (16 - 37)  SpO2: 98% (06 Dec 2017 07:00) (89% - 98%)    I&O's Summary    05 Dec 2017 07:01  -  06 Dec 2017 07:00  --------------------------------------------------------  IN: 22.5 mL / OUT: 1210 mL / NET: -1187.5 mL        CAPILLARY BLOOD GLUCOSE          Constitutional: WDWN resting comfortably in bed; NAD  Head: NC/AT  Eyes: PERRL, EOMI, anicteric sclera  ENT: no nasal discharge; absence of uvula, no oropharyngeal erythema or exudates; MMM  Neck: supple; no JVD or thyromegaly  Respiratory: CTA B/L no wheezes/rales/rhonchi  Cardiac: Ventricularly paced at 78, no M/R/G  Gastrointestinal: Soft, NT, slightly distended; no rebound or guarding; +BSx4  Extremities: WWP, no clubbing or cyanosis; no peripheral edema  Musculoskeletal: NROM x4; no joint swelling, tenderness or erythema  Vascular: 2+ radial, femoral, DP/PT pulses B/L  Neurologic: AAOx3; CNII-XII grossly intact; no focal deficits    MEDICATIONS  (STANDING):  levoFLOXacin IVPB 750 milliGRAM(s) IV Intermittent every 24 hours  levoFLOXacin IVPB      melatonin 5 milliGRAM(s) Oral at bedtime    MEDICATIONS  (PRN):  acetaminophen   Tablet 650 milliGRAM(s) Oral every 6 hours PRN For Temp greater than 38 C (100.4 F)      LABS                        14.0   7.9   )-----------( 85       ( 06 Dec 2017 03:17 )             42.0     12-    137  |  103  |  19  ----------------------------<  122<H>  4.0   |  23  |  0.98    Ca    8.7      06 Dec 2017 03:17  Mg     1.6     12-    TPro  7.6  /  Alb  4.7  /  TBili  0.8  /  DBili  x   /  AST  34  /  ALT  58<H>  /  AlkPhos  62  12-    LIVER FUNCTIONS - ( 05 Dec 2017 17:01 )  Alb: 4.7 g/dL / Pro: 7.6 g/dL / ALK PHOS: 62 U/L / ALT: 58 U/L / AST: 34 U/L / GGT: x           PT/INR - ( 06 Dec 2017 03:17 )   PT: 13.4 sec;   INR: 1.20          PTT - ( 06 Dec 2017 03:17 )  PTT:21.0 sec  Urinalysis Basic - ( 05 Dec 2017 23:34 )    Color: Yellow / Appearance: Clear / S.015 / pH: x  Gluc: x / Ketone: NEGATIVE  / Bili: Negative / Urobili: 0.2 E.U./dL   Blood: x / Protein: NEGATIVE mg/dL / Nitrite: NEGATIVE   Leuk Esterase: NEGATIVE / RBC: < 5 /HPF / WBC < 5 /HPF   Sq Epi: x / Non Sq Epi: 0-5 /HPF / Bacteria: Present /HPF      CARDIAC MARKERS ( 05 Dec 2017 17:01 )  x     / <0.01 ng/mL / 107 U/L / x     / 2.0 ng/mL        IMAGING/EKG/ETC  EKG:   Xray:  CT: O/N Events:  Overnight the patient's TVP was placed.  After the procedure the began to have cough and rigors.  There was concern for pneumothorax but there was no evidence on xray or CT scan.  He spiked a fever to 101.8 with dysuria.  UA and RVP were negative and urine and blood cultures were sent.  He was started on levaquin 750mg (possible PCN allergy).  A 500cc bolus was given because IVC was visualized to be <2.1.   Subjective:  Patient seen and examined at bedside, feels well this morning.  Denies chest pain, shortness of breath, dizziness, lightheadedness and palpitations.    VITALS  Vital Signs Last 24 Hrs  T(C): 36.7 (06 Dec 2017 07:19), Max: 38.8 (05 Dec 2017 23:13)  T(F): 98 (06 Dec 2017 07:19), Max: 101.8 (05 Dec 2017 23:13)  HR: 78 (06 Dec 2017 07:00) (37 - 78)  BP: 112/69 (06 Dec 2017 07:00) (90/67 - 167/73)  BP(mean): 82 (06 Dec 2017 07:00) (74 - 110)  RR: 24 (06 Dec 2017 07:00) (16 - 37)  SpO2: 98% (06 Dec 2017 07:00) (89% - 98%)    I&O's Summary    05 Dec 2017 07:01  -  06 Dec 2017 07:00  --------------------------------------------------------  IN: 22.5 mL / OUT: 1210 mL / NET: -1187.5 mL        CAPILLARY BLOOD GLUCOSE        Physical Exam  Constitutional: WDWN resting comfortably in bed; NAD  Head: NC/AT  Eyes: PERRL, EOMI, anicteric sclera  ENT: no nasal discharge; absence of uvula, no oropharyngeal erythema or exudates; MMM  Neck: supple; no JVD or thyromegaly  Respiratory: CTA B/L no wheezes/rales/rhonchi  Cardiac: Ventricularly paced at 78, no M/R/G  Gastrointestinal: Soft, NT, slightly distended; no rebound or guarding; +BSx4  Extremities: WWP, no clubbing or cyanosis; no peripheral edema  Musculoskeletal: NROM x4; no joint swelling, tenderness or erythema  Vascular: 2+ radial, femoral, DP/PT pulses B/L  Neurologic: AAOx3; CNII-XII grossly intact; no focal deficits    MEDICATIONS  (STANDING):  levoFLOXacin IVPB 750 milliGRAM(s) IV Intermittent every 24 hours  levoFLOXacin IVPB      melatonin 5 milliGRAM(s) Oral at bedtime    MEDICATIONS  (PRN):  acetaminophen   Tablet 650 milliGRAM(s) Oral every 6 hours PRN For Temp greater than 38 C (100.4 F)      LABS                        14.0   7.9   )-----------( 85       ( 06 Dec 2017 03:17 )             42.0     12-    137  |  103  |  19  ----------------------------<  122<H>  4.0   |  23  |  0.98    Ca    8.7      06 Dec 2017 03:17  Mg     1.6     12    TPro  7.6  /  Alb  4.7  /  TBili  0.8  /  DBili  x   /  AST  34  /  ALT  58<H>  /  AlkPhos  62  12-    LIVER FUNCTIONS - ( 05 Dec 2017 17:01 )  Alb: 4.7 g/dL / Pro: 7.6 g/dL / ALK PHOS: 62 U/L / ALT: 58 U/L / AST: 34 U/L / GGT: x           PT/INR - ( 06 Dec 2017 03:17 )   PT: 13.4 sec;   INR: 1.20          PTT - ( 06 Dec 2017 03:17 )  PTT:21.0 sec  Urinalysis Basic - ( 05 Dec 2017 23:34 )    Color: Yellow / Appearance: Clear / S.015 / pH: x  Gluc: x / Ketone: NEGATIVE  / Bili: Negative / Urobili: 0.2 E.U./dL   Blood: x / Protein: NEGATIVE mg/dL / Nitrite: NEGATIVE   Leuk Esterase: NEGATIVE / RBC: < 5 /HPF / WBC < 5 /HPF   Sq Epi: x / Non Sq Epi: 0-5 /HPF / Bacteria: Present /HPF      CARDIAC MARKERS ( 05 Dec 2017 17:01 )  x     / <0.01 ng/mL / 107 U/L / x     / 2.0 ng/mL      < from: Xray Chest 1 View AP -PORTABLE-Routine (17 @ 05:46) >    EXAM:  XR CHEST 1 VIEW PORT ROUTINE                          PROCEDURE DATE:  2017                     INTERPRETATION:  CLINICAL INDICATION: 67-year-old with shortness of   breath.      FINDINGS: Portable view of the chest is compared to 2017 and   demonstrates persistent moderate residual interstitial pulmonary edema.   Region of alveolar edema within the right lower lobe is intervally   worsened. Top normal heart size. Interval worsening of now small to   moderate left pleural effusion with underlying left basilar atelectasis.   No change in position of transvenous pacer with tip overlying the right   ventricle.            "Thank you for the opportunity to participate in the care of this   patient."        ANIKET BADILLO M.D., ATTENDING RADIOLOGIST  This document has been electronically signed. Dec  6 2017 10:54AM    < end of copied text >

## 2017-12-06 NOTE — PROGRESS NOTE ADULT - ASSESSMENT
66yo M with PMH of arthritis, hepatitis C (resolved, s/p treatment) with residual cirrhosis, and long-standing productive cough with copious sputum production relieved by Mucinex who presented to [facility] ED in complete heart block. TVP in place and admitted to CCU for further management.    - Overnight patient developed rigors, chills, and cough after TVP placement. (Note that per interview in ER, cough with copious sputum production were present for months prior to admission. Patient endorses taking Mucinex for several months with some relief). CXR, CT chest w/o evidence of PTX. Patient spiked to Tmax 101.8 (rectal) ~11PM, with symptoms of dysuria and evidence of pleural effusion on chest CT. Started Levaquin. Patient is paced via TVP at 70-80bpm and hemodynamically stable.   - Given temp spike/ possible infection, will defer pacemaker implant today. Will continue to evaluate the patient and will place PPM when medically appropriate, possible PPM implant Thurs or Friday pending patient's condition.   - Discussed PPM implant procedure and required follow-up with the patient. The patient verbalized understanding and agreed to proceed with the procedure when medically appropriate.

## 2017-12-06 NOTE — PROGRESS NOTE ADULT - PROBLEM SELECTOR PLAN 2
-HSQ  -No GI prophylaxis -Patient on vancomycin 1250 q 12 and zosyn 4.5 q 8 after receiving levaquin 750mg overnight -Patient on vancomycin 1250 q 12 and levaquin 750mg daily

## 2017-12-06 NOTE — PROVIDER CONTACT NOTE (OTHER) - BACKGROUND
eulalia and MD Perez and MD Cruz do not believe it to be a true allergy. Pharmacy spoke with MD Cruz and order was approved. Will administer zosyn dose slowly and observe for signs of a reaction.

## 2017-12-06 NOTE — CONSULT NOTE ADULT - SUBJECTIVE AND OBJECTIVE BOX
66 Yo male seen for 1st time 12/5 related to episodic dyspnea/palpitation and few months of intermittent dizziness.   exam showed HR in 30's an EKg with high grade A-V block/RBBB  patient sent to ER for tlel admission and perm. pacer after echo/blood work,etc  received TVP last nite(RI) and ??spiked temp 101.8 after that with chills  CXR suggests ?develoing infiltrate  Did he aspirate when TVP placed?  Was he bacteremic from procedure?  did he have developing infection pre-admission(not by history)  on levaquin now

## 2017-12-06 NOTE — PROGRESS NOTE ADULT - ASSESSMENT
Patient is a 67 year old male with PMH arthritis, hepatitis C (patient unaware of reason for infection) with residual cirrhosis and long standing productive cough with copious sputum production releived by mucinex who presents to [facility] for irregular heartbeat found to be in complete heart block. Patient is a 67 year old male with PMH arthritis, hepatitis C (patient unaware of reason for infection) with residual cirrhosis, s/p tracheostomy at age of 4 2/2 croup, long standing productive cough with copious sputum production relieved by mucinex who presents to [facility] for irregular heartbeat found to be in complete heart block. Patient is a 67 year old male with PMH arthritis, hepatitis C (patient unaware of reason for infection) with residual cirrhosis, tracheostomy at age of 4 2/2 croup, long standing productive cough with copious sputum production relieved by mucinex who presents to [facility] for irregular heartbeat found to be in complete heart block going for PPM.

## 2017-12-06 NOTE — PROVIDER CONTACT NOTE (OTHER) - ASSESSMENT
Will monitor closely. Will monitor closely. Addendum: At 5 PM tried to give medication to pt. Pt stated his wife and private MD were on the phone and expressed their concern for getting zosyn. PMD spoke to MD Cruz and pt cont. to refuse the dose. MD Perez and MD Cruz aware. Zosyn held and will endorse to night team.

## 2017-12-06 NOTE — PROGRESS NOTE ADULT - SUBJECTIVE AND OBJECTIVE BOX
EPS Progress Note    S: Admitted to CCU from ER, TVP placed. Patient had rigors, chills overnight after TVP insertion. Spiked temp to 101.8. Patient feels much better this am. Wife Trang was also present.     O: T(C): 37.5 (17 @ 09:15), Max: 38.8 (17 @ 23:13)  HR: 78 (17 @ 09:00) (37 - 78)  BP: 104/72 (17 @ 09:00) (90/67 - 167/73)  RR: 19 (17 @ 09:00) (16 - 37)  SpO2: 97% (17 @ 09:00) (89% - 98%)  Daily Height in cm: 175.26 (05 Dec 2017 18:00)    Daily Weight in k.1 (06 Dec 2017 07:19)    TELE: Paced via TVP at 70-80bpm.     PHYSICAL  General:  NAD, pleasant, conversant  Chest:  CTA B/L, no w/r/r  Cardiac:  paced at 70-80bpm, + s1/s2 , no m/g/r  Abdomen:  +BS , soft ND/NT  Extremities: WWP, No edema    LABS:                        14.0   7.9   )-----------( 85       ( 06 Dec 2017 03:17 )             42.0     12-    137  |  103  |  19  ----------------------------<  122<H>  4.0   |  23  |  0.98    Ca    8.7      06 Dec 2017 03:17  Mg     1.6     -    TPro  7.6  /  Alb  4.7  /  TBili  0.8  /  DBili  x   /  AST  34  /  ALT  58<H>  /  AlkPhos  62  12-    PT/INR - ( 06 Dec 2017 03:17 )   PT: 13.4 sec;   INR: 1.20     PTT - ( 06 Dec 2017 03:17 )  PTT:21.0 sec      MEDICATIONS:  acetaminophen   Tablet 650 milliGRAM(s) Oral every 6 hours PRN  heparin  Injectable 5000 Unit(s) SubCutaneous every 8 hours  levoFLOXacin IVPB      levoFLOXacin IVPB 750 milliGRAM(s) IV Intermittent every 24 hours  magnesium sulfate  IVPB 1 Gram(s) IV Intermittent once  melatonin 5 milliGRAM(s) Oral at bedtime  vancomycin  IVPB 1250 milliGRAM(s) IV Intermittent once  vancomycin  IVPB 1250 milliGRAM(s) IV Intermittent every 12 hours  vancomycin  IVPB          ASSESSMENT/PLAN EPS Progress Note    S: Admitted to CCU from ER, TVP placed. Patient had rigors, chills overnight after TVP insertion. Spiked temp to 101.8 last night. Patient feels much better this am. No further fever, shaking, or chills. Wife Trang was also present.     O: T(C): 37.5 (17 @ 09:15), Max: 38.8 (17 @ 23:13)  HR: 78 (17 @ 09:00) (37 - 78)  BP: 104/72 (17 @ 09:00) (90/67 - 167/73)  RR: 19 (17 @ 09:00) (16 - 37)  SpO2: 97% (17 @ 09:00) (89% - 98%)  Daily Height in cm: 175.26 (05 Dec 2017 18:00)    Daily Weight in k.1 (06 Dec 2017 07:19)    TELE: Paced via TVP at 70-80bpm. Occasional ectopy. Period of Asystole noted at ~750pm last night when checking underlying rhythm.    PHYSICAL  General:  NAD, pleasant, conversant  Chest:  CTA B/L, no w/r/r  Cardiac:  paced at 70-80bpm, + s1/s2 , no m/g/r  Abdomen:  +BS , soft ND/ NT  Neuro: Alert & oriented x3  Psych: Appropriate affect  Extremities: WWP, No edema    LABS:                        14.0   7.9   )-----------( 85       ( 06 Dec 2017 03:17 )             42.0     12    137  |  103  |  19  ----------------------------<  122<H>  4.0   |  23  |  0.98    Ca    8.7      06 Dec 2017 03:17  Mg     1.6         TPro  7.6  /  Alb  4.7  /  TBili  0.8  /  DBili  x   /  AST  34  /  ALT  58<H>  /  AlkPhos  62  12-    PT/INR - ( 06 Dec 2017 03:17 )   PT: 13.4 sec;   INR: 1.20     PTT - ( 06 Dec 2017 03:17 )  PTT:21.0 sec      MEDICATIONS:  acetaminophen   Tablet 650 milliGRAM(s) Oral every 6 hours PRN  heparin  Injectable 5000 Unit(s) SubCutaneous every 8 hours  levoFLOXacin IVPB      levoFLOXacin IVPB 750 milliGRAM(s) IV Intermittent every 24 hours  magnesium sulfate  IVPB 1 Gram(s) IV Intermittent once  melatonin 5 milliGRAM(s) Oral at bedtime  vancomycin  IVPB 1250 milliGRAM(s) IV Intermittent once  vancomycin  IVPB 1250 milliGRAM(s) IV Intermittent every 12 hours  vancomycin  IVPB          ASSESSMENT/PLAN

## 2017-12-06 NOTE — PROGRESS NOTE ADULT - PROBLEM SELECTOR PLAN 1
-Patient with 4-5 month history of dyspnea on exertion, palpitations, dizziness and lightheadedness found to be in complete heart block with HRs in the 30s-40s.  -EP consulted-place TVP with plan for PPM tomorrow  -Patient currently on dopamine drip-will D/C once TVP placed  -NPO after midnight for PPM tomorrow  -Obtain echocardiogram  -Follow up TSH, HbA1C and lipid profile -Patient with 4-5 month history of dyspnea on exertion, palpitations, dizziness and lightheadedness found to be in complete heart block with HRs in the 30s-40s.  -EP consulted-TVP placed, was for PPM today but spiked fever, needs to be afebrile for 24 hours prior to procedure, possilbe PPM tomorrow  -NPO after midnight for PPM tomorrow  -Obtain echocardiogram  -TSH and HbA1C within normal limits and lipid profile with slightly elevated triglycerides and decreased HDL

## 2017-12-06 NOTE — PROVIDER CONTACT NOTE (OTHER) - BACKGROUND
to say he was feeling lightheaded again. HR remains in 70-80s. SBP 90s-100s when repeated. MD Gomez and MD Perez made aware and assessed pt.  mL NS given and PO intake encouraged. Will continue

## 2017-12-06 NOTE — PROGRESS NOTE ADULT - PROBLEM SELECTOR PLAN 5
-No IVF indicated  -Will replete to K>4 and Mg>2  -DASH/TLC, NPO after midnight for PPM treatment  -Full Code  -Dispo [facility]

## 2017-12-07 LAB
ACE SERPL-CCNC: 33 U/L — SIGNIFICANT CHANGE UP (ref 14–82)
ANA TITR SER: NEGATIVE — SIGNIFICANT CHANGE UP
ANION GAP SERPL CALC-SCNC: 9 MMOL/L — SIGNIFICANT CHANGE UP (ref 5–17)
BUN SERPL-MCNC: 11 MG/DL — SIGNIFICANT CHANGE UP (ref 7–23)
CALCIUM SERPL-MCNC: 8.3 MG/DL — LOW (ref 8.4–10.5)
CHLORIDE SERPL-SCNC: 103 MMOL/L — SIGNIFICANT CHANGE UP (ref 96–108)
CO2 SERPL-SCNC: 24 MMOL/L — SIGNIFICANT CHANGE UP (ref 22–31)
CREAT SERPL-MCNC: 0.82 MG/DL — SIGNIFICANT CHANGE UP (ref 0.5–1.3)
CULTURE RESULTS: SIGNIFICANT CHANGE UP
FOLATE SERPL-MCNC: 13.1 NG/ML — SIGNIFICANT CHANGE UP (ref 4.8–24.2)
GLUCOSE SERPL-MCNC: 141 MG/DL — HIGH (ref 70–99)
GRAM STN FLD: SIGNIFICANT CHANGE UP
HCT VFR BLD CALC: 42.9 % — SIGNIFICANT CHANGE UP (ref 39–50)
HGB BLD-MCNC: 14.7 G/DL — SIGNIFICANT CHANGE UP (ref 13–17)
MAGNESIUM SERPL-MCNC: 2 MG/DL — SIGNIFICANT CHANGE UP (ref 1.6–2.6)
MCHC RBC-ENTMCNC: 31.8 PG — SIGNIFICANT CHANGE UP (ref 27–34)
MCHC RBC-ENTMCNC: 34.3 G/DL — SIGNIFICANT CHANGE UP (ref 32–36)
MCV RBC AUTO: 92.9 FL — SIGNIFICANT CHANGE UP (ref 80–100)
PLATELET # BLD AUTO: 80 K/UL — LOW (ref 150–400)
POTASSIUM SERPL-MCNC: 3.8 MMOL/L — SIGNIFICANT CHANGE UP (ref 3.5–5.3)
POTASSIUM SERPL-SCNC: 3.8 MMOL/L — SIGNIFICANT CHANGE UP (ref 3.5–5.3)
RBC # BLD: 4.62 M/UL — SIGNIFICANT CHANGE UP (ref 4.2–5.8)
RBC # FLD: 12.9 % — SIGNIFICANT CHANGE UP (ref 10.3–16.9)
SODIUM SERPL-SCNC: 136 MMOL/L — SIGNIFICANT CHANGE UP (ref 135–145)
SPECIMEN SOURCE: SIGNIFICANT CHANGE UP
VANCOMYCIN TROUGH SERPL-MCNC: 8.9 UG/ML — LOW (ref 10–20)
VIT B12 SERPL-MCNC: 191 PG/ML — LOW (ref 243–894)
WBC # BLD: 6 K/UL — SIGNIFICANT CHANGE UP (ref 3.8–10.5)
WBC # FLD AUTO: 6 K/UL — SIGNIFICANT CHANGE UP (ref 3.8–10.5)

## 2017-12-07 PROCEDURE — 71010: CPT | Mod: 26

## 2017-12-07 PROCEDURE — 99291 CRITICAL CARE FIRST HOUR: CPT

## 2017-12-07 RX ORDER — POLYETHYLENE GLYCOL 3350 17 G/17G
17 POWDER, FOR SOLUTION ORAL DAILY
Qty: 0 | Refills: 0 | Status: DISCONTINUED | OUTPATIENT
Start: 2017-12-07 | End: 2017-12-09

## 2017-12-07 RX ORDER — FUROSEMIDE 40 MG
20 TABLET ORAL ONCE
Qty: 0 | Refills: 0 | Status: COMPLETED | OUTPATIENT
Start: 2017-12-07 | End: 2017-12-07

## 2017-12-07 RX ORDER — OXYCODONE AND ACETAMINOPHEN 5; 325 MG/1; MG/1
1 TABLET ORAL EVERY 6 HOURS
Qty: 0 | Refills: 0 | Status: DISCONTINUED | OUTPATIENT
Start: 2017-12-07 | End: 2017-12-09

## 2017-12-07 RX ORDER — OXYCODONE AND ACETAMINOPHEN 5; 325 MG/1; MG/1
1 TABLET ORAL ONCE
Qty: 0 | Refills: 0 | Status: DISCONTINUED | OUTPATIENT
Start: 2017-12-07 | End: 2017-12-07

## 2017-12-07 RX ORDER — VANCOMYCIN HCL 1 G
VIAL (EA) INTRAVENOUS
Qty: 0 | Refills: 0 | Status: DISCONTINUED | OUTPATIENT
Start: 2017-12-08 | End: 2017-12-08

## 2017-12-07 RX ORDER — POTASSIUM CHLORIDE 20 MEQ
40 PACKET (EA) ORAL ONCE
Qty: 0 | Refills: 0 | Status: COMPLETED | OUTPATIENT
Start: 2017-12-07 | End: 2017-12-07

## 2017-12-07 RX ORDER — VANCOMYCIN HCL 1 G
1500 VIAL (EA) INTRAVENOUS ONCE
Qty: 0 | Refills: 0 | Status: COMPLETED | OUTPATIENT
Start: 2017-12-07 | End: 2017-12-08

## 2017-12-07 RX ORDER — VANCOMYCIN HCL 1 G
1500 VIAL (EA) INTRAVENOUS EVERY 12 HOURS
Qty: 0 | Refills: 0 | Status: DISCONTINUED | OUTPATIENT
Start: 2017-12-08 | End: 2017-12-08

## 2017-12-07 RX ADMIN — HEPARIN SODIUM 5000 UNIT(S): 5000 INJECTION INTRAVENOUS; SUBCUTANEOUS at 15:48

## 2017-12-07 RX ADMIN — Medication 166.67 MILLIGRAM(S): at 10:39

## 2017-12-07 RX ADMIN — HEPARIN SODIUM 5000 UNIT(S): 5000 INJECTION INTRAVENOUS; SUBCUTANEOUS at 21:06

## 2017-12-07 RX ADMIN — OXYCODONE AND ACETAMINOPHEN 1 TABLET(S): 5; 325 TABLET ORAL at 16:17

## 2017-12-07 RX ADMIN — OXYCODONE AND ACETAMINOPHEN 1 TABLET(S): 5; 325 TABLET ORAL at 09:20

## 2017-12-07 RX ADMIN — OXYCODONE AND ACETAMINOPHEN 1 TABLET(S): 5; 325 TABLET ORAL at 08:55

## 2017-12-07 RX ADMIN — OXYCODONE AND ACETAMINOPHEN 1 TABLET(S): 5; 325 TABLET ORAL at 00:41

## 2017-12-07 RX ADMIN — Medication 5 MILLIGRAM(S): at 21:07

## 2017-12-07 RX ADMIN — Medication 40 MILLIEQUIVALENT(S): at 07:56

## 2017-12-07 RX ADMIN — OXYCODONE AND ACETAMINOPHEN 1 TABLET(S): 5; 325 TABLET ORAL at 15:47

## 2017-12-07 RX ADMIN — POLYETHYLENE GLYCOL 3350 17 GRAM(S): 17 POWDER, FOR SOLUTION ORAL at 13:29

## 2017-12-07 RX ADMIN — HEPARIN SODIUM 5000 UNIT(S): 5000 INJECTION INTRAVENOUS; SUBCUTANEOUS at 07:57

## 2017-12-07 RX ADMIN — OXYCODONE AND ACETAMINOPHEN 1 TABLET(S): 5; 325 TABLET ORAL at 02:32

## 2017-12-07 RX ADMIN — Medication 20 MILLIGRAM(S): at 11:55

## 2017-12-07 RX ADMIN — OXYCODONE AND ACETAMINOPHEN 1 TABLET(S): 5; 325 TABLET ORAL at 22:22

## 2017-12-07 NOTE — PROGRESS NOTE ADULT - SUBJECTIVE AND OBJECTIVE BOX
O/N Events:  Subjective:    VITALS  Vital Signs Last 24 Hrs  T(C): 37.1 (07 Dec 2017 09:00), Max: 37.8 (06 Dec 2017 16:00)  T(F): 98.7 (07 Dec 2017 09:00), Max: 100.1 (06 Dec 2017 16:00)  HR: 78 (07 Dec 2017 11:00) (78 - 79)  BP: 123/92 (07 Dec 2017 11:00) (101/64 - 173/107)  BP(mean): 108 (07 Dec 2017 11:00) (80 - 142)  RR: 18 (07 Dec 2017 11:00) (16 - 31)  SpO2: 98% (07 Dec 2017 11:00) (94% - 98%)    I&O's Summary    06 Dec 2017 07:01  -  07 Dec 2017 07:00  --------------------------------------------------------  IN: 990 mL / OUT: 2370 mL / NET: -1380 mL    07 Dec 2017 07:01  -  07 Dec 2017 11:58  --------------------------------------------------------  IN: 570 mL / OUT: 0 mL / NET: 570 mL        CAPILLARY BLOOD GLUCOSE          PHYSICAL EXAM  General: A&Ox 3; NAD  Head: NC/AT;   Eyes: PERRL; EOMI; anicteric sclera  Neck: Supple; no JVD  Respiratory: CTA B/L; no wheezes/crackles/rales auscultated w/ good air movement  Cardiovascular: Regular rhythm/rate; S1/S2; no gallops or murmurs auscultated  Gastrointestinal: Soft; NTND w/out rebound tenderness or guarding; bowel sounds normal  Extremities: WWP; no edema or cyanosis; radial/pedal pulses palpable  Neurological:  CNII-XII grossly intact; no obvious focal deficits    MEDICATIONS  (STANDING):  heparin  Injectable 5000 Unit(s) SubCutaneous every 8 hours  melatonin 5 milliGRAM(s) Oral at bedtime  polyethylene glycol 3350 17 Gram(s) Oral daily  vancomycin  IVPB 1250 milliGRAM(s) IV Intermittent every 12 hours  vancomycin  IVPB        MEDICATIONS  (PRN):  acetaminophen   Tablet 650 milliGRAM(s) Oral every 6 hours PRN For Temp greater than 38 C (100.4 F)      LABS                        14.7   6.0   )-----------( 80       ( 07 Dec 2017 02:53 )             42.9         136  |  103  |  11  ----------------------------<  141<H>  3.8   |  24  |  0.82    Ca    8.3<L>      07 Dec 2017 02:54  Mg     2.0         TPro  7.6  /  Alb  4.7  /  TBili  0.8  /  DBili  x   /  AST  34  /  ALT  58<H>  /  AlkPhos  62  12-    LIVER FUNCTIONS - ( 05 Dec 2017 17:01 )  Alb: 4.7 g/dL / Pro: 7.6 g/dL / ALK PHOS: 62 U/L / ALT: 58 U/L / AST: 34 U/L / GGT: x           PT/INR - ( 06 Dec 2017 03:17 )   PT: 13.4 sec;   INR: 1.20          PTT - ( 06 Dec 2017 03:17 )  PTT:21.0 sec  Urinalysis Basic - ( 05 Dec 2017 23:34 )    Color: Yellow / Appearance: Clear / S.015 / pH: x  Gluc: x / Ketone: NEGATIVE  / Bili: Negative / Urobili: 0.2 E.U./dL   Blood: x / Protein: NEGATIVE mg/dL / Nitrite: NEGATIVE   Leuk Esterase: NEGATIVE / RBC: < 5 /HPF / WBC < 5 /HPF   Sq Epi: x / Non Sq Epi: 0-5 /HPF / Bacteria: Present /HPF      CARDIAC MARKERS ( 05 Dec 2017 17:01 )  x     / <0.01 ng/mL / 107 U/L / x     / 2.0 ng/mL        IMAGING/EKG/ETC  EKG:   Xray:  CT: O/N Events:  No acute events.  Subjective:  Patient seen and examined at bedside.  Complaining of neck stiffness which has improved with repositioning.  No other complaints at this time.  Denies chest pain, shortness of breath, palpitations, dizziness or lightheadedness.    VITALS  Vital Signs Last 24 Hrs  T(C): 37.1 (07 Dec 2017 09:00), Max: 37.8 (06 Dec 2017 16:00)  T(F): 98.7 (07 Dec 2017 09:00), Max: 100.1 (06 Dec 2017 16:00)  HR: 78 (07 Dec 2017 11:00) (78 - 79)  BP: 123/92 (07 Dec 2017 11:00) (101/64 - 173/107)  BP(mean): 108 (07 Dec 2017 11:) (80 - 142)  RR: 18 (07 Dec 2017 11:00) (16 - 31)  SpO2: 98% (07 Dec 2017 11:00) (94% - 98%)    I&O's Summary    06 Dec 2017 07:01  -  07 Dec 2017 07:00  --------------------------------------------------------  IN: 990 mL / OUT: 2370 mL / NET: -1380 mL    07 Dec 2017 07:01  -  07 Dec 2017 11:58  --------------------------------------------------------  IN: 570 mL / OUT: 0 mL / NET: 570 mL        CAPILLARY BLOOD GLUCOSE          PHYSICAL EXAM  General: A&Ox 3; WDWN M laying in bed in NAD  Head: NC/AT;   Eyes: PERRL; EOMI; anicteric sclera  Neck: Supple; no JVD with TVP in place in R IJ  Respiratory: Patient with bilateral crackles but no wheezes  Cardiovascular: Ventricularly paced at 78, no M/R/G  Gastrointestinal: Soft; NT, distended w/out rebound tenderness or guarding; bowel sounds normal  Extremities: WWP; no edema or cyanosis; radial/pedal pulses palpable  Neurological:  CNII-XII grossly intact; no obvious focal deficits    MEDICATIONS  (STANDING):  heparin  Injectable 5000 Unit(s) SubCutaneous every 8 hours  melatonin 5 milliGRAM(s) Oral at bedtime  polyethylene glycol 3350 17 Gram(s) Oral daily  vancomycin  IVPB 1250 milliGRAM(s) IV Intermittent every 12 hours  vancomycin  IVPB        MEDICATIONS  (PRN):  acetaminophen   Tablet 650 milliGRAM(s) Oral every 6 hours PRN For Temp greater than 38 C (100.4 F)      LABS                        14.7   6.0   )-----------( 80       ( 07 Dec 2017 02:53 )             42.9         136  |  103  |  11  ----------------------------<  141<H>  3.8   |  24  |  0.82    Ca    8.3<L>      07 Dec 2017 02:54  Mg     2.0         TPro  7.6  /  Alb  4.7  /  TBili  0.8  /  DBili  x   /  AST  34  /  ALT  58<H>  /  AlkPhos  62  12-    LIVER FUNCTIONS - ( 05 Dec 2017 17:01 )  Alb: 4.7 g/dL / Pro: 7.6 g/dL / ALK PHOS: 62 U/L / ALT: 58 U/L / AST: 34 U/L / GGT: x           PT/INR - ( 06 Dec 2017 03:17 )   PT: 13.4 sec;   INR: 1.20          PTT - ( 06 Dec 2017 03:17 )  PTT:21.0 sec  Urinalysis Basic - ( 05 Dec 2017 23:34 )    Color: Yellow / Appearance: Clear / S.015 / pH: x  Gluc: x / Ketone: NEGATIVE  / Bili: Negative / Urobili: 0.2 E.U./dL   Blood: x / Protein: NEGATIVE mg/dL / Nitrite: NEGATIVE   Leuk Esterase: NEGATIVE / RBC: < 5 /HPF / WBC < 5 /HPF   Sq Epi: x / Non Sq Epi: 0-5 /HPF / Bacteria: Present /HPF      CARDIAC MARKERS ( 05 Dec 2017 17:01 )  x     / <0.01 ng/mL / 107 U/L / x     / 2.0 ng/mL    < from: Xray Chest 1 View AP -PORTABLE-Routine (17 @ 05:46) >  EXAM:  XR CHEST 1 VIEW PORT ROUTINE                          PROCEDURE DATE:  2017                     INTERPRETATION:  CLINICAL INDICATION: 67-year-old with shortness of   breath.      FINDINGS: Portable view of the chest is compared to 2017 and   demonstrates persistent moderate residual interstitial pulmonary edema.   Region of alveolar edema within the right lower lobe is intervally   worsened. Top normal heart size. Interval worsening of now small to   moderate left pleural effusion with underlying left basilar atelectasis.   No change in position of transvenous pacer with tip overlying the right   ventricle.            "Thank you for the opportunity to participate in the care of this   patient."        ANIKET BADILLO M.D., ATTENDING RADIOLOGIST  This document has been electronically signed. Dec  6 2017 10:54AM    < end of copied text >

## 2017-12-07 NOTE — PROGRESS NOTE ADULT - PROBLEM SELECTOR PLAN 2
-Patient on vancomycin 1250 q 12 and levaquin 750mg daily -With pleural effusion and crackles on exam  -Lasix 20 given for left pleural effusion  -Patient on vancomycin 1250 q 12 with vanc trough at 9PM before 10PM dose and levaquin 750mg PO daily

## 2017-12-07 NOTE — PROVIDER CONTACT NOTE (OTHER) - SITUATION
Called to bedside. 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 SBP at that time was in 80s (below his baseline). Current SBP 130s. Pt called me back
Pt temp 100.1 temporal. MD Perez and MD Cruz aware. Zosyn added to regimen despite pt having a documented PCN allergy. I spoke to the MDs who spoke with the pt. Pt reports his allergic reaction was
Swelling noted at incision site of pt right side of the neck, Dr Cruz aware and stated no further intervention needed at this time, beside x-ray. Will continue to monitor.

## 2017-12-07 NOTE — PROGRESS NOTE ADULT - ASSESSMENT
Patient is a 67 year old male with PMH arthritis, hepatitis C (patient unaware of reason for infection) with residual cirrhosis, tracheostomy at age of 2 2/2 croup, long standing productive cough with copious sputum production relieved by mucinex who presents to [facility] for irregular heartbeat found to be in complete heart block going for PPM.

## 2017-12-07 NOTE — PROGRESS NOTE ADULT - PROBLEM SELECTOR PLAN 3
-Patient presented with thrombocytopenia, possibly 2/2 residual cirrhosis from previous hepatitis  -Peripheral smear, b12 and folate will be sent -Patient presented with thrombocytopenia, possibly 2/2 residual cirrhosis from previous hepatitis  -Peripheral smear shows large platelets, possibly 2/2 known underlying cirrhosis  -B12 and folate pending

## 2017-12-08 LAB
ANION GAP SERPL CALC-SCNC: 10 MMOL/L — SIGNIFICANT CHANGE UP (ref 5–17)
APTT BLD: 27.4 SEC — LOW (ref 27.5–37.4)
BUN SERPL-MCNC: 13 MG/DL — SIGNIFICANT CHANGE UP (ref 7–23)
CALCIUM SERPL-MCNC: 9.4 MG/DL — SIGNIFICANT CHANGE UP (ref 8.4–10.5)
CHLORIDE SERPL-SCNC: 100 MMOL/L — SIGNIFICANT CHANGE UP (ref 96–108)
CO2 SERPL-SCNC: 27 MMOL/L — SIGNIFICANT CHANGE UP (ref 22–31)
CREAT SERPL-MCNC: 0.88 MG/DL — SIGNIFICANT CHANGE UP (ref 0.5–1.3)
CULTURE RESULTS: NO GROWTH — SIGNIFICANT CHANGE UP
GLUCOSE SERPL-MCNC: 124 MG/DL — HIGH (ref 70–99)
HCT VFR BLD CALC: 44.8 % — SIGNIFICANT CHANGE UP (ref 39–50)
HGB BLD-MCNC: 15.6 G/DL — SIGNIFICANT CHANGE UP (ref 13–17)
INR BLD: 1.21 — HIGH (ref 0.88–1.16)
MAGNESIUM SERPL-MCNC: 2 MG/DL — SIGNIFICANT CHANGE UP (ref 1.6–2.6)
MCHC RBC-ENTMCNC: 32.4 PG — SIGNIFICANT CHANGE UP (ref 27–34)
MCHC RBC-ENTMCNC: 34.8 G/DL — SIGNIFICANT CHANGE UP (ref 32–36)
MCV RBC AUTO: 93.1 FL — SIGNIFICANT CHANGE UP (ref 80–100)
PLATELET # BLD AUTO: 106 K/UL — LOW (ref 150–400)
POTASSIUM SERPL-MCNC: 4.2 MMOL/L — SIGNIFICANT CHANGE UP (ref 3.5–5.3)
POTASSIUM SERPL-SCNC: 4.2 MMOL/L — SIGNIFICANT CHANGE UP (ref 3.5–5.3)
PROTHROM AB SERPL-ACNC: 13.5 SEC — HIGH (ref 9.8–12.7)
RBC # BLD: 4.81 M/UL — SIGNIFICANT CHANGE UP (ref 4.2–5.8)
RBC # FLD: 13.1 % — SIGNIFICANT CHANGE UP (ref 10.3–16.9)
SODIUM SERPL-SCNC: 137 MMOL/L — SIGNIFICANT CHANGE UP (ref 135–145)
SPECIMEN SOURCE: SIGNIFICANT CHANGE UP
WBC # BLD: 7.7 K/UL — SIGNIFICANT CHANGE UP (ref 3.8–10.5)
WBC # FLD AUTO: 7.7 K/UL — SIGNIFICANT CHANGE UP (ref 3.8–10.5)

## 2017-12-08 PROCEDURE — 93010 ELECTROCARDIOGRAM REPORT: CPT

## 2017-12-08 PROCEDURE — 33208 INSRT HEART PM ATRIAL & VENT: CPT | Mod: KX

## 2017-12-08 PROCEDURE — 99291 CRITICAL CARE FIRST HOUR: CPT

## 2017-12-08 PROCEDURE — 71010: CPT | Mod: 26

## 2017-12-08 RX ORDER — HYDROCORTISONE 1 %
1 OINTMENT (GRAM) TOPICAL
Qty: 0 | Refills: 0 | Status: DISCONTINUED | OUTPATIENT
Start: 2017-12-08 | End: 2017-12-09

## 2017-12-08 RX ORDER — VANCOMYCIN HCL 1 G
1500 VIAL (EA) INTRAVENOUS ONCE
Qty: 0 | Refills: 0 | Status: COMPLETED | OUTPATIENT
Start: 2017-12-08 | End: 2017-12-08

## 2017-12-08 RX ADMIN — HEPARIN SODIUM 5000 UNIT(S): 5000 INJECTION INTRAVENOUS; SUBCUTANEOUS at 14:49

## 2017-12-08 RX ADMIN — OXYCODONE AND ACETAMINOPHEN 1 TABLET(S): 5; 325 TABLET ORAL at 13:30

## 2017-12-08 RX ADMIN — OXYCODONE AND ACETAMINOPHEN 1 TABLET(S): 5; 325 TABLET ORAL at 20:00

## 2017-12-08 RX ADMIN — OXYCODONE AND ACETAMINOPHEN 1 TABLET(S): 5; 325 TABLET ORAL at 05:23

## 2017-12-08 RX ADMIN — POLYETHYLENE GLYCOL 3350 17 GRAM(S): 17 POWDER, FOR SOLUTION ORAL at 12:39

## 2017-12-08 RX ADMIN — OXYCODONE AND ACETAMINOPHEN 1 TABLET(S): 5; 325 TABLET ORAL at 19:03

## 2017-12-08 RX ADMIN — Medication 300 MILLIGRAM(S): at 22:16

## 2017-12-08 RX ADMIN — HEPARIN SODIUM 5000 UNIT(S): 5000 INJECTION INTRAVENOUS; SUBCUTANEOUS at 07:17

## 2017-12-08 RX ADMIN — Medication 1 APPLICATION(S): at 22:15

## 2017-12-08 RX ADMIN — HEPARIN SODIUM 5000 UNIT(S): 5000 INJECTION INTRAVENOUS; SUBCUTANEOUS at 22:15

## 2017-12-08 RX ADMIN — OXYCODONE AND ACETAMINOPHEN 1 TABLET(S): 5; 325 TABLET ORAL at 04:23

## 2017-12-08 RX ADMIN — Medication 300 MILLIGRAM(S): at 00:12

## 2017-12-08 RX ADMIN — OXYCODONE AND ACETAMINOPHEN 1 TABLET(S): 5; 325 TABLET ORAL at 12:39

## 2017-12-08 NOTE — PROGRESS NOTE ADULT - SUBJECTIVE AND OBJECTIVE BOX
O/N Events:  Subjective:    VITALS  Vital Signs Last 24 Hrs  T(C): 36.8 (08 Dec 2017 07:00), Max: 37.7 (07 Dec 2017 16:56)  T(F): 98.2 (08 Dec 2017 07:00), Max: 99.9 (07 Dec 2017 16:56)  HR: 78 (08 Dec 2017 06:00) (78 - 78)  BP: 106/86 (08 Dec 2017 06:00) (99/62 - 151/81)  BP(mean): 95 (08 Dec 2017 06:00) (86 - 120)  RR: 18 (08 Dec 2017 06:00) (13 - 25)  SpO2: 96% (08 Dec 2017 06:00) (95% - 98%)    I&O's Summary    07 Dec 2017 07:01  -  08 Dec 2017 07:00  --------------------------------------------------------  IN: 1130 mL / OUT: 3010 mL / NET: -1880 mL        CAPILLARY BLOOD GLUCOSE          PHYSICAL EXAM  General: A&Ox 3; NAD  Head: NC/AT;   Eyes: PERRL; EOMI; anicteric sclera  Neck: Supple; no JVD  Respiratory: CTA B/L; no wheezes/crackles/rales auscultated w/ good air movement  Cardiovascular: Regular rhythm/rate; S1/S2; no gallops or murmurs auscultated  Gastrointestinal: Soft; NTND w/out rebound tenderness or guarding; bowel sounds normal  Extremities: WWP; no edema or cyanosis; radial/pedal pulses palpable  Neurological:  CNII-XII grossly intact; no obvious focal deficits    MEDICATIONS  (STANDING):  heparin  Injectable 5000 Unit(s) SubCutaneous every 8 hours  levoFLOXacin  Tablet 750 milliGRAM(s) Oral every 24 hours  melatonin 5 milliGRAM(s) Oral at bedtime  polyethylene glycol 3350 17 Gram(s) Oral daily  vancomycin  IVPB 1500 milliGRAM(s) IV Intermittent every 12 hours  vancomycin  IVPB        MEDICATIONS  (PRN):  acetaminophen   Tablet 650 milliGRAM(s) Oral every 6 hours PRN For Temp greater than 38 C (100.4 F)  oxyCODONE    5 mG/acetaminophen 325 mG 1 Tablet(s) Oral every 6 hours PRN Severe Pain (7 - 10)      LABS                        15.6   7.7   )-----------( 106      ( 08 Dec 2017 02:31 )             44.8     12-08    137  |  100  |  13  ----------------------------<  124<H>  4.2   |  27  |  0.88    Ca    9.4      08 Dec 2017 02:31  Mg     2.0     12-08        PT/INR - ( 08 Dec 2017 02:31 )   PT: 13.5 sec;   INR: 1.21          PTT - ( 08 Dec 2017 02:31 )  PTT:27.4 sec          IMAGING/EKG/ETC  EKG:   Xray:  CT: Transfer Note    Patient is a 67 year old male with PMH arthritis, hepatitis C (patient unaware of reason for infection) with residual cirrhosis, long standing productive cough with copious sputum production relieved by mucinex and previous tracheostomy at age 2 (which was reversed 2/2 croup who presented to [facility] for irregular heartbeat found to be in complete heart block.  On arrival to [facility], his HR was 38, was found to have complete heart block on EKG and was admitted to CCU for further management.  A transvenous pacemaker was placed in the right IJ as a temporary measure with plan for PPM for the next day.  However the patient spiked a fever of 101.8 and levaquin was started for presumed pneumonia because of a possible penicillin allergy of anaphylaxis and he was unable to go for procedure.  The following day, the patient had a temperature of 100.1 and vancomycin was added to his regimen per EP recs and once again he could not go for procedure.  His vancomycin trough was subtherapeutic so his dose was increased from 1250 q 12 to 1500 q 12.  After being afebrile the patient was able to go for PPM and deemed medically stable for discharge.    VITALS  Vital Signs Last 24 Hrs  T(C): 36.8 (08 Dec 2017 07:00), Max: 37.7 (07 Dec 2017 16:56)  T(F): 98.2 (08 Dec 2017 07:00), Max: 99.9 (07 Dec 2017 16:56)  HR: 78 (08 Dec 2017 06:00) (78 - 78)  BP: 106/86 (08 Dec 2017 06:00) (99/62 - 151/81)  BP(mean): 95 (08 Dec 2017 06:00) (86 - 120)  RR: 18 (08 Dec 2017 06:00) (13 - 25)  SpO2: 96% (08 Dec 2017 06:00) (95% - 98%)    I&O's Summary    07 Dec 2017 07:01  -  08 Dec 2017 07:00  --------------------------------------------------------  IN: 1130 mL / OUT: 3010 mL / NET: -1880 mL        CAPILLARY BLOOD GLUCOSE          PHYSICAL EXAM  General: A&Ox 3; WDWN M laying in bed in NAD  Head: NC/AT;   Eyes: PERRL; EOMI; anicteric sclera  Neck: Supple; no JVD with TVP in place in R IJ  Respiratory: Patient with bilateral crackles but no wheezes  Cardiovascular: Ventricularly paced at 78, no M/R/G  Gastrointestinal: Soft; NT, distended w/out rebound tenderness or guarding; bowel sounds normal  Extremities: WWP; no edema or cyanosis; radial/pedal pulses palpable  Neurological:  CNII-XII grossly intact; no obvious focal deficits    MEDICATIONS  (STANDING):  heparin  Injectable 5000 Unit(s) SubCutaneous every 8 hours  levoFLOXacin  Tablet 750 milliGRAM(s) Oral every 24 hours  melatonin 5 milliGRAM(s) Oral at bedtime  polyethylene glycol 3350 17 Gram(s) Oral daily  vancomycin  IVPB 1500 milliGRAM(s) IV Intermittent every 12 hours  vancomycin  IVPB        MEDICATIONS  (PRN):  acetaminophen   Tablet 650 milliGRAM(s) Oral every 6 hours PRN For Temp greater than 38 C (100.4 F)  oxyCODONE    5 mG/acetaminophen 325 mG 1 Tablet(s) Oral every 6 hours PRN Severe Pain (7 - 10)      LABS                        15.6   7.7   )-----------( 106      ( 08 Dec 2017 02:31 )             44.8     12-08    137  |  100  |  13  ----------------------------<  124<H>  4.2   |  27  |  0.88    Ca    9.4      08 Dec 2017 02:31  Mg     2.0     12-08        PT/INR - ( 08 Dec 2017 02:31 )   PT: 13.5 sec;   INR: 1.21          PTT - ( 08 Dec 2017 02:31 )  PTT:27.4 sec    < from: Xray Chest 1 View AP -PORTABLE-Routine (12.08.17 @ 07:00) >  EXAM:  XR CHEST 1 VIEW PORT ROUTINE                          PROCEDURE DATE:  12/08/2017                     INTERPRETATION:  Chest X-Ray dated 12/8/2017 7:00 AM    Indication: Shortness of breath. Left pleural effusion.    Comparison studies: Chest dated 12/7/2017    An AP portable view of the chest demonstrates resolution of the   previously seen pulmonary vascular congestion. Linear atelectasis is seen   at the left base. Left pleural effusion has decreased.      IMPRESSION:  Resolution of pulmonary vascular congestion. Decreased left pleural   effusion.            "Thank you for the opportunity to participate in the care of this   patient."        MINNA MARIA M.D., ATTENDING RADIOLOGIST  This document has been electronically signed. Dec 8 2017  9:42AM    < end of copied text >

## 2017-12-08 NOTE — PROGRESS NOTE ADULT - PROBLEM SELECTOR PLAN 3
-Patient presented with thrombocytopenia, possibly 2/2 residual cirrhosis from previous hepatitis  -Peripheral smear shows large platelets, possibly 2/2 known underlying cirrhosis  -B12 slightly low and folate within normal limits  -CTM

## 2017-12-08 NOTE — PROGRESS NOTE ADULT - ASSESSMENT
Patient is a 67 year old male with PMH arthritis, hepatitis C (patient unaware of reason for infection) with residual cirrhosis, tracheostomy at age of 2 2/2 croup, long standing productive cough with copious sputum production relieved by mucinex who presents to [facility] for irregular heartbeat found to be in complete heart block going for PPM.

## 2017-12-08 NOTE — PROGRESS NOTE ADULT - PROBLEM SELECTOR PLAN 1
-Patient with 4-5 month history of dyspnea on exertion, palpitations, dizziness and lightheadedness found to be in complete heart block with HRs in the 30s-40s.  Now s/p PPM POD 0.

## 2017-12-08 NOTE — PROGRESS NOTE ADULT - PROBLEM SELECTOR PLAN 2
-With pleural effusion and crackles on exam  -Lasix 20 given for left pleural effusion  -Patient on vancomycin 1250 q 12 with vanc trough at 9PM before 10PM dose and levaquin 750mg PO daily -Patient previously on vancomycin 1250 q 12 with vanc trough subtherapeutic last night so dose was increased to 1500 q 12.  -Levaquin 750mg PO daily

## 2017-12-08 NOTE — PROGRESS NOTE ADULT - PROBLEM SELECTOR PLAN 5
-No IVF indicated  -Will replete to K>4 and Mg>2  -DASH/TLC, NPO after midnight for PPM treatment  -Full Code  -[facility] -No IVF indicated  -Will replete to K>4 and Mg>2  -DASH/TLC, NPO for PPM treatment  -Full Code  -[facility]

## 2017-12-08 NOTE — PROGRESS NOTE ADULT - PROBLEM SELECTOR PLAN 1
-Patient with 4-5 month history of dyspnea on exertion, palpitations, dizziness and lightheadedness found to be in complete heart block with HRs in the 30s-40s.  -EP consulted-TVP placed, was for PPM but spiked multiple fevers, needs to be afebrile for 24 hours prior to procedure, possilbe PPM tomorrow  -NPO after midnight for PPM tomorrow  -TSH and HbA1C within normal limits and lipid profile with slightly elevated triglycerides and decreased HDL -Patient with 4-5 month history of dyspnea on exertion, palpitations, dizziness and lightheadedness found to be in complete heart block with HRs in the 30s-40s.  -EP consulted-TVP placed, was for PPM but spiked multiple fevers, PPM today  -TSH and HbA1C within normal limits and lipid profile with slightly elevated triglycerides and decreased HDL

## 2017-12-08 NOTE — PROGRESS NOTE ADULT - PROBLEM SELECTOR PLAN 2
Pt does not meet criteria for HAP, will treat for CAP.  S/p Levaquin started 12/6 and vanc added 12/7.    -D/c vancomycin  -Levaquin 750mg PO daily

## 2017-12-08 NOTE — PROGRESS NOTE ADULT - ATTENDING COMMENTS
See CCU resident note for additional details     67 year old male with remote history of Croup (age 2) s/p Tracheostomy since reversed, Osteoarthritis, Chronic Productive Cough, HCV s/p therapy with no active disease and Cirrhosis who presents with subacute dyspnea, fatigue, dizziness with acute worsening in past 24 hours. Pt found to be in CHB on intake EKG with low junctional escape in 30s. Patient admitted to CCU for further management.     Interval events, pt with T 101.8 overnight. Cultured. Imaging c/w bilateral infiltrates. Pt initiated on abx to cover for CAP and aspiration PNA. This morning pt has no cardiopulmonary or infectious complaints. Does endorse months of productive cough, worse in the mornings.   Vitals reviewed 12/6  Exam notable for elderly male laying in bed appears fatigued but in NAD, RRR, no MGR detected, R IJ cordis and TVP in place, bilateral scattered crackles, no rhonchi, no wheezing, no cough, abdomen NTTP, no RAMON, skin WWP, A&ox3  Labs reviewed 12/6  EKG 12/6: SR, Vpaced at 79  CXR 12/6: RML infiltrate, no pneumo  CT Neck and Chest 12/5: no pneumo, bilateral infiltrates and pleural effusions    -Formal TTE pending  -TVP to remain in place at VVI 80  -Defer PPM placement today given febrile episode  -Trial 1.5gm Unasyn in light of nebulous PCN allergy (not anaphylaxis)  -Add vancomycin to regimen at behest of EPS given anticipated device implantation  -TCP work up  -->NB: prior CT abd with e/o cirrhosis  Pt to remain in CCU for transvenous pacing while awaiting PPM placement    MD Maile  CCU Attending
See CCU resident note for additional details     67 year old male with remote history of Croup (age 2) s/p Tracheostomy since reversed, Osteoarthritis, Chronic Productive Cough, HCV s/p therapy with no active disease and Cirrhosis who presents with subacute dyspnea, fatigue, dizziness with acute worsening in past 24 hours. Pt found to be in CHB on intake EKG with low junctional escape in 30s. Patient admitted to CCU for further management.     Interval events, pt with T 100.1 at 16:00 12/6. No chills/rigors. No new infectious symptoms. Still with persistent productive cough. This morning pt has no cardiopulmonary symptoms. Reports neck stiffness relieved with heat pack.     Vitals reviewed 12/7  Exam notable for elderly male sitting up in bed in NAD, family at bedside, RRR, no MGR detected, R IJ cordis and TVP in place, bilateral scattered wet crackles,wet cough, abdomen NTTP, no RAMON, skin WWP, A&ox3  Labs reviewed 12/7  EKG 12/7: Vpaced   CXR 12/7: RML infiltrate, +vascular congestion, + pleural effusion  CT Neck and Chest 12/5: no pneumo, bilateral infiltrates and pleural effusions  TTE 12/7: LVEF 55-60%    -Lasix 20 x 1 given pulmonary congestion and effusion  -TVP to remain in place at VVI 80  -Plan for PPM implantation 12/8 if no recurrent fever  -Pt given 1 dose vanco, do not keep standing  -->Plan to given 1 dose vanco post PPM implantation  -Continue po levaquin for CAP coverage  -Document PCN allergy of anaphylaxis  Pt to remain in CCU for transvenous pacing while awaiting PPM placement    MD Maile  CCU Attending
See CCU resident note for additional details     67 year old male with remote history of Croup (age 2) s/p Tracheostomy since reversed, Osteoarthritis, Chronic Productive Cough, HCV s/p therapy with no active disease and Cirrhosis who presents with subacute dyspnea, fatigue, dizziness with acute worsening in past 24 hours. Pt found to be in CHB on intake EKG with low junctional escape in 30s. Patient admitted to CCU for further management.  Vitals and labs reviewed 12/8  EKG 12/8: Vpaced   CT Neck and Chest 12/5: no pneumo, bilateral infiltrates and pleural effusions  TTE 12/7: LVEF 55-60%    -NPO for PPM  -TVP to be removed post PPM implantation  -Pt given 1 dose vanco post procedure  -Continue po levaquin for CAP coverage  Pt to transfer to stepdown post PPM implantation    MD Maile  CCU Attending

## 2017-12-08 NOTE — PROGRESS NOTE ADULT - SUBJECTIVE AND OBJECTIVE BOX
PGY-1 Transfer Acceptance Note    Patient is a 67 year old male with PMH arthritis, hepatitis C (patient unaware of reason for infection) with residual cirrhosis, long standing productive cough with copious sputum production relieved by mucinex and previous tracheostomy at age 2 (which was reversed 2/2 croup) who presented to [facility] for irregular heartbeat found to be in complete heart block.  On arrival to [facility], his HR was 38, was found to have complete heart block on EKG and was admitted to CCU for further management.  A transvenous pacemaker was placed in the right IJ as a temporary measure with plan for PPM for the next day.  However the patient spiked a fever of 101.8 and levaquin was started for presumed pneumonia because of a possible penicillin allergy of anaphylaxis and he was unable to go for procedure.  The following day, the patient had a temperature of 100.1 and vancomycin was added to his regimen per EP recs and once again he could not go for procedure.  His vancomycin trough was subtherapeutic so his dose was increased from 1250 q 12 to 1500 q 12.  After being afebrile the patient was able to go for PPM and deemed medically stable for discharge.    VITALS  Vital Signs Last 24 Hrs  T(C): 36.8 (08 Dec 2017 07:00), Max: 37.7 (07 Dec 2017 16:56)  T(F): 98.2 (08 Dec 2017 07:00), Max: 99.9 (07 Dec 2017 16:56)  HR: 78 (08 Dec 2017 06:00) (78 - 78)  BP: 106/86 (08 Dec 2017 06:00) (99/62 - 151/81)  BP(mean): 95 (08 Dec 2017 06:00) (86 - 120)  RR: 18 (08 Dec 2017 06:00) (13 - 25)  SpO2: 96% (08 Dec 2017 06:00) (95% - 98%)    I&O's Summary    07 Dec 2017 07:01  -  08 Dec 2017 07:00  --------------------------------------------------------  IN: 1130 mL / OUT: 3010 mL / NET: -1880 mL    CAPILLARY BLOOD GLUCOSE    PHYSICAL EXAM  General: A&Ox 3; WDWN M laying in bed in NAD  Head: NC/AT;   Eyes: PERRL; EOMI; anicteric sclera  Neck: Supple; no JVD   Respiratory: CTA b/l  Cardiovascular: paced RRR, no M/R/G, pacemaker site CDI, no hematoma  Gastrointestinal: Soft; distended w/out rebound tenderness or guarding; bowel sounds present  Extremities: WWP; no edema or cyanosis; radial/pedal pulses palpable  Neurological:  CNII-XII grossly intact; no gross deficits    MEDICATIONS  (STANDING):  heparin  Injectable 5000 Unit(s) SubCutaneous every 8 hours  levoFLOXacin  Tablet 750 milliGRAM(s) Oral every 24 hours  melatonin 5 milliGRAM(s) Oral at bedtime  polyethylene glycol 3350 17 Gram(s) Oral daily  vancomycin  IVPB 1500 milliGRAM(s) IV Intermittent every 12 hours  vancomycin  IVPB        MEDICATIONS  (PRN):  acetaminophen   Tablet 650 milliGRAM(s) Oral every 6 hours PRN For Temp greater than 38 C (100.4 F)  oxyCODONE    5 mG/acetaminophen 325 mG 1 Tablet(s) Oral every 6 hours PRN Severe Pain (7 - 10)      LABS                        15.6   7.7   )-----------( 106      ( 08 Dec 2017 02:31 )             44.8     12-08    137  |  100  |  13  ----------------------------<  124<H>  4.2   |  27  |  0.88    Ca    9.4      08 Dec 2017 02:31  Mg     2.0     12-08        PT/INR - ( 08 Dec 2017 02:31 )   PT: 13.5 sec;   INR: 1.21          PTT - ( 08 Dec 2017 02:31 )  PTT:27.4 sec    < from: Xray Chest 1 View AP -PORTABLE-Routine (12.08.17 @ 07:00) >  EXAM:  XR CHEST 1 VIEW PORT ROUTINE                          PROCEDURE DATE:  12/08/2017          INTERPRETATION:  Chest X-Ray dated 12/8/2017 7:00 AM    Indication: Shortness of breath. Left pleural effusion.    Comparison studies: Chest dated 12/7/2017    An AP portable view of the chest demonstrates resolution of the   previously seen pulmonary vascular congestion. Linear atelectasis is seen   at the left base. Left pleural effusion has decreased.      IMPRESSION:  Resolution of pulmonary vascular congestion. Decreased left pleural   effusion.    "Thank you for the opportunity to participate in the care of this   patient."        MINNA MARIA M.D., ATTENDING RADIOLOGIST  This document has been electronically signed. Dec 8 2017  9:42AM    < end of copied text >

## 2017-12-08 NOTE — PROGRESS NOTE ADULT - PROBLEM SELECTOR PLAN 3
-Patient presented with thrombocytopenia, possibly 2/2 residual cirrhosis from previous hepatitis  -Peripheral smear shows large platelets, possibly 2/2 known underlying cirrhosis  -B12 and folate pending -Patient presented with thrombocytopenia, possibly 2/2 residual cirrhosis from previous hepatitis  -Peripheral smear shows large platelets, possibly 2/2 known underlying cirrhosis  -B12 slightly low and folate within normal limits

## 2017-12-08 NOTE — PROGRESS NOTE ADULT - ASSESSMENT
Patient is a 67 year old male with PMH arthritis, hepatitis C (patient unaware of reason for infection) with residual cirrhosis, tracheostomy at age of 2 2/2 croup, long standing productive cough with copious sputum production relieved by mucinex who presents to [facility] for irregular heartbeat found to be in complete heart POD 0 s/p PPM.

## 2017-12-09 ENCOUNTER — TRANSCRIPTION ENCOUNTER (OUTPATIENT)
Age: 67
End: 2017-12-09

## 2017-12-09 VITALS — TEMPERATURE: 99 F

## 2017-12-09 LAB
ANION GAP SERPL CALC-SCNC: 10 MMOL/L — SIGNIFICANT CHANGE UP (ref 5–17)
BUN SERPL-MCNC: 14 MG/DL — SIGNIFICANT CHANGE UP (ref 7–23)
CALCIUM SERPL-MCNC: 9.4 MG/DL — SIGNIFICANT CHANGE UP (ref 8.4–10.5)
CHLORIDE SERPL-SCNC: 97 MMOL/L — SIGNIFICANT CHANGE UP (ref 96–108)
CO2 SERPL-SCNC: 26 MMOL/L — SIGNIFICANT CHANGE UP (ref 22–31)
CREAT SERPL-MCNC: 0.89 MG/DL — SIGNIFICANT CHANGE UP (ref 0.5–1.3)
GLUCOSE SERPL-MCNC: 104 MG/DL — HIGH (ref 70–99)
HCT VFR BLD CALC: 43.6 % — SIGNIFICANT CHANGE UP (ref 39–50)
HGB BLD-MCNC: 15.2 G/DL — SIGNIFICANT CHANGE UP (ref 13–17)
MAGNESIUM SERPL-MCNC: 1.9 MG/DL — SIGNIFICANT CHANGE UP (ref 1.6–2.6)
MCHC RBC-ENTMCNC: 32.5 PG — SIGNIFICANT CHANGE UP (ref 27–34)
MCHC RBC-ENTMCNC: 34.9 G/DL — SIGNIFICANT CHANGE UP (ref 32–36)
MCV RBC AUTO: 93.4 FL — SIGNIFICANT CHANGE UP (ref 80–100)
PLATELET # BLD AUTO: 119 K/UL — LOW (ref 150–400)
POTASSIUM SERPL-MCNC: 4.2 MMOL/L — SIGNIFICANT CHANGE UP (ref 3.5–5.3)
POTASSIUM SERPL-SCNC: 4.2 MMOL/L — SIGNIFICANT CHANGE UP (ref 3.5–5.3)
RBC # BLD: 4.67 M/UL — SIGNIFICANT CHANGE UP (ref 4.2–5.8)
RBC # FLD: 13.3 % — SIGNIFICANT CHANGE UP (ref 10.3–16.9)
SODIUM SERPL-SCNC: 133 MMOL/L — LOW (ref 135–145)
WBC # BLD: 6.2 K/UL — SIGNIFICANT CHANGE UP (ref 3.8–10.5)
WBC # FLD AUTO: 6.2 K/UL — SIGNIFICANT CHANGE UP (ref 3.8–10.5)

## 2017-12-09 PROCEDURE — 71020: CPT | Mod: 26

## 2017-12-09 PROCEDURE — 99238 HOSP IP/OBS DSCHRG MGMT 30/<: CPT

## 2017-12-09 PROCEDURE — 93280 PM DEVICE PROGR EVAL DUAL: CPT | Mod: 26

## 2017-12-09 PROCEDURE — 99239 HOSP IP/OBS DSCHRG MGMT >30: CPT

## 2017-12-09 PROCEDURE — 93010 ELECTROCARDIOGRAM REPORT: CPT

## 2017-12-09 RX ORDER — OXYCODONE AND ACETAMINOPHEN 5; 325 MG/1; MG/1
1 TABLET ORAL ONCE
Qty: 0 | Refills: 0 | Status: DISCONTINUED | OUTPATIENT
Start: 2017-12-09 | End: 2017-12-09

## 2017-12-09 RX ORDER — CIPROFLOXACIN LACTATE 400MG/40ML
1 VIAL (ML) INTRAVENOUS
Qty: 2 | Refills: 0
Start: 2017-12-09 | End: 2017-12-11

## 2017-12-09 RX ORDER — CIPROFLOXACIN LACTATE 400MG/40ML
1 VIAL (ML) INTRAVENOUS
Qty: 2 | Refills: 0 | OUTPATIENT
Start: 2017-12-09 | End: 2017-12-11

## 2017-12-09 RX ADMIN — OXYCODONE AND ACETAMINOPHEN 1 TABLET(S): 5; 325 TABLET ORAL at 11:28

## 2017-12-09 RX ADMIN — Medication 5 MILLIGRAM(S): at 00:40

## 2017-12-09 RX ADMIN — OXYCODONE AND ACETAMINOPHEN 1 TABLET(S): 5; 325 TABLET ORAL at 01:37

## 2017-12-09 RX ADMIN — OXYCODONE AND ACETAMINOPHEN 1 TABLET(S): 5; 325 TABLET ORAL at 00:43

## 2017-12-09 RX ADMIN — OXYCODONE AND ACETAMINOPHEN 1 TABLET(S): 5; 325 TABLET ORAL at 12:06

## 2017-12-09 RX ADMIN — OXYCODONE AND ACETAMINOPHEN 1 TABLET(S): 5; 325 TABLET ORAL at 05:31

## 2017-12-09 RX ADMIN — HEPARIN SODIUM 5000 UNIT(S): 5000 INJECTION INTRAVENOUS; SUBCUTANEOUS at 06:33

## 2017-12-09 RX ADMIN — OXYCODONE AND ACETAMINOPHEN 1 TABLET(S): 5; 325 TABLET ORAL at 06:32

## 2017-12-09 NOTE — DISCHARGE NOTE ADULT - PATIENT PORTAL LINK FT
“You can access the Follow[employer] Patient Portal, offered by [facility], by registering with the following website: [email or web address]”

## 2017-12-09 NOTE — DISCHARGE NOTE ADULT - CARE PLAN
Principal Discharge DX:	Complete heart block  Goal:	To remain in a normal heart rhythm with pacemaker  Instructions for follow-up, activity and diet:	Please follow up with Dr. Redmond on 1/4/18 @ 2PM for follow up after Pacemaker. Also, f/u with your cardiologist, Dr. Manrique in 2 weeks post discharge.  Secondary Diagnosis:	Pneumonia  Goal:	Complete treatment of Pneumonia, improvement of cough.  Instructions for follow-up, activity and diet:	Continue taking antibiotics for two more days-please complete all doses  If you continue have a cough, fever/chills, shortness of breath, please notify your primary medical doctor

## 2017-12-09 NOTE — DISCHARGE NOTE ADULT - CARE PROVIDER_API CALL
Edmund German), Cardiac Electrophysiology; Cardiovascular Disease; Internal Medicine  [address]  [address] [place] [address]  Phone: [phone]  Fax: [phone]    Joesph Manrique), Cardiovascular Disease; Internal Medicine  [address] [place] [address] [address] [place] [address]  Phone: [phone]  Fax: [phone]

## 2017-12-09 NOTE — DISCHARGE NOTE ADULT - MEDICATION SUMMARY - MEDICATIONS TO STOP TAKING
I will STOP taking the medications listed below when I get home from the hospital:    Percocet 5/325  -- 1 tab(s) by mouth every 4 to 6 hours, As Needed - for severe pain

## 2017-12-09 NOTE — DISCHARGE NOTE ADULT - HOSPITAL COURSE
Patient is a 67 year old male with PMH arthritis, hepatitis C (patient unaware of reason for infection) with residual cirrhosis, long standing productive cough with copious sputum production relieved by mucinex and previous tracheostomy at age 2 (which was reversed 2/2 croup) who presented to [facility] for irregular heartbeat found to be in complete heart block.  On arrival to [facility], his HR was 38, was found to have complete heart block on EKG and was admitted to CCU for further management.  A transvenous pacemaker was placed in the right IJ as a temporary measure with plan for PPM for the next day.  However the patient spiked a fever of 101.8 and levaquin was started for presumed pneumonia because of a possible penicillin allergy of anaphylaxis and he was unable to go for procedure.  The following day, the patient had a temperature of 100.1 and vancomycin was added to his regimen per EP recs and once again he could not go for procedure.  His vancomycin trough was subtherapeutic so his dose was increased from 1250 q 12 to 1500 q 12.  After being afebrile the patient was able to go for PPM and deemed medically stable for discharge.

## 2017-12-09 NOTE — DISCHARGE NOTE ADULT - PLAN OF CARE
To remain in a normal heart rhythm with pacemaker Please follow up with Dr. Redmond on 1/4/18 @ 2PM for follow up after Pacemaker. Also, f/u with your cardiologist, Dr. Manrique in 2 weeks post discharge. Complete treatment of Pneumonia, improvement of cough. Continue taking antibiotics for two more days-please complete all doses  If you continue have a cough, fever/chills, shortness of breath, please notify your primary medical doctor

## 2017-12-09 NOTE — DISCHARGE NOTE ADULT - MEDICATION SUMMARY - MEDICATIONS TO TAKE
I will START or STAY ON the medications listed below when I get home from the hospital:    oxyCODONE-acetaminophen 5 mg-325 mg oral tablet  -- 1 tab(s) by mouth every 6 hours, As needed, Severe Pain (7 - 10) MDD:4 tablets/day  -- Indication: For s/p PPM for pain management     levoFLOXacin 750 mg oral tablet  -- 1 tab(s) by mouth every 24 hours  -- Indication: For Pneumonia

## 2017-12-10 LAB
CULTURE RESULTS: SIGNIFICANT CHANGE UP
CULTURE RESULTS: SIGNIFICANT CHANGE UP
SPECIMEN SOURCE: SIGNIFICANT CHANGE UP
SPECIMEN SOURCE: SIGNIFICANT CHANGE UP

## 2017-12-12 DIAGNOSIS — Z88.2 ALLERGY STATUS TO SULFONAMIDES: ICD-10-CM

## 2017-12-12 DIAGNOSIS — Z86.19 PERSONAL HISTORY OF OTHER INFECTIOUS AND PARASITIC DISEASES: ICD-10-CM

## 2017-12-12 DIAGNOSIS — K74.69 OTHER CIRRHOSIS OF LIVER: ICD-10-CM

## 2017-12-12 DIAGNOSIS — J18.9 PNEUMONIA, UNSPECIFIED ORGANISM: ICD-10-CM

## 2017-12-12 DIAGNOSIS — I44.2 ATRIOVENTRICULAR BLOCK, COMPLETE: ICD-10-CM

## 2017-12-12 DIAGNOSIS — R30.0 DYSURIA: ICD-10-CM

## 2017-12-12 DIAGNOSIS — R00.1 BRADYCARDIA, UNSPECIFIED: ICD-10-CM

## 2017-12-12 DIAGNOSIS — D69.6 THROMBOCYTOPENIA, UNSPECIFIED: ICD-10-CM

## 2017-12-12 DIAGNOSIS — M19.90 UNSPECIFIED OSTEOARTHRITIS, UNSPECIFIED SITE: ICD-10-CM

## 2017-12-21 PROCEDURE — 85027 COMPLETE CBC AUTOMATED: CPT

## 2017-12-21 PROCEDURE — 80202 ASSAY OF VANCOMYCIN: CPT

## 2017-12-21 PROCEDURE — 99291 CRITICAL CARE FIRST HOUR: CPT | Mod: 25

## 2017-12-21 PROCEDURE — 86900 BLOOD TYPING SEROLOGIC ABO: CPT

## 2017-12-21 PROCEDURE — 86850 RBC ANTIBODY SCREEN: CPT

## 2017-12-21 PROCEDURE — 80053 COMPREHEN METABOLIC PANEL: CPT

## 2017-12-21 PROCEDURE — 96374 THER/PROPH/DIAG INJ IV PUSH: CPT

## 2017-12-21 PROCEDURE — 86431 RHEUMATOID FACTOR QUANT: CPT

## 2017-12-21 PROCEDURE — 81001 URINALYSIS AUTO W/SCOPE: CPT

## 2017-12-21 PROCEDURE — 93005 ELECTROCARDIOGRAM TRACING: CPT

## 2017-12-21 PROCEDURE — 80048 BASIC METABOLIC PNL TOTAL CA: CPT

## 2017-12-21 PROCEDURE — 93306 TTE W/DOPPLER COMPLETE: CPT

## 2017-12-21 PROCEDURE — 87486 CHLMYD PNEUM DNA AMP PROBE: CPT

## 2017-12-21 PROCEDURE — 84443 ASSAY THYROID STIM HORMONE: CPT

## 2017-12-21 PROCEDURE — 80307 DRUG TEST PRSMV CHEM ANLYZR: CPT

## 2017-12-21 PROCEDURE — 87449 NOS EACH ORGANISM AG IA: CPT

## 2017-12-21 PROCEDURE — 83036 HEMOGLOBIN GLYCOSYLATED A1C: CPT

## 2017-12-21 PROCEDURE — 87070 CULTURE OTHR SPECIMN AEROBIC: CPT

## 2017-12-21 PROCEDURE — 85730 THROMBOPLASTIN TIME PARTIAL: CPT

## 2017-12-21 PROCEDURE — 85025 COMPLETE CBC W/AUTO DIFF WBC: CPT

## 2017-12-21 PROCEDURE — 71046 X-RAY EXAM CHEST 2 VIEWS: CPT

## 2017-12-21 PROCEDURE — 36415 COLL VENOUS BLD VENIPUNCTURE: CPT

## 2017-12-21 PROCEDURE — 87633 RESP VIRUS 12-25 TARGETS: CPT

## 2017-12-21 PROCEDURE — 82550 ASSAY OF CK (CPK): CPT

## 2017-12-21 PROCEDURE — 85610 PROTHROMBIN TIME: CPT

## 2017-12-21 PROCEDURE — 86780 TREPONEMA PALLIDUM: CPT

## 2017-12-21 PROCEDURE — 70490 CT SOFT TISSUE NECK W/O DYE: CPT

## 2017-12-21 PROCEDURE — C1892: CPT

## 2017-12-21 PROCEDURE — C1898: CPT

## 2017-12-21 PROCEDURE — 86901 BLOOD TYPING SEROLOGIC RH(D): CPT

## 2017-12-21 PROCEDURE — 71045 X-RAY EXAM CHEST 1 VIEW: CPT

## 2017-12-21 PROCEDURE — 87389 HIV-1 AG W/HIV-1&-2 AB AG IA: CPT

## 2017-12-21 PROCEDURE — 83605 ASSAY OF LACTIC ACID: CPT

## 2017-12-21 PROCEDURE — C1785: CPT

## 2017-12-21 PROCEDURE — 83735 ASSAY OF MAGNESIUM: CPT

## 2017-12-21 PROCEDURE — 86235 NUCLEAR ANTIGEN ANTIBODY: CPT

## 2017-12-21 PROCEDURE — 82164 ANGIOTENSIN I ENZYME TEST: CPT

## 2017-12-21 PROCEDURE — 82553 CREATINE MB FRACTION: CPT

## 2017-12-21 PROCEDURE — 71250 CT THORAX DX C-: CPT

## 2017-12-21 PROCEDURE — 87086 URINE CULTURE/COLONY COUNT: CPT

## 2017-12-21 PROCEDURE — 82746 ASSAY OF FOLIC ACID SERUM: CPT

## 2017-12-21 PROCEDURE — 84484 ASSAY OF TROPONIN QUANT: CPT

## 2017-12-21 PROCEDURE — 87798 DETECT AGENT NOS DNA AMP: CPT

## 2017-12-21 PROCEDURE — 82607 VITAMIN B-12: CPT

## 2017-12-21 PROCEDURE — 87040 BLOOD CULTURE FOR BACTERIA: CPT

## 2017-12-21 PROCEDURE — 86038 ANTINUCLEAR ANTIBODIES: CPT

## 2017-12-21 PROCEDURE — 80061 LIPID PANEL: CPT

## 2017-12-21 PROCEDURE — 86618 LYME DISEASE ANTIBODY: CPT

## 2017-12-21 PROCEDURE — 87581 M.PNEUMON DNA AMP PROBE: CPT

## 2018-01-08 ENCOUNTER — APPOINTMENT (OUTPATIENT)
Dept: HEART AND VASCULAR | Facility: CLINIC | Age: 68
End: 2018-01-08

## 2018-01-08 VITALS
HEART RATE: 70 BPM | WEIGHT: 176 LBS | SYSTOLIC BLOOD PRESSURE: 140 MMHG | HEIGHT: 69 IN | DIASTOLIC BLOOD PRESSURE: 80 MMHG | BODY MASS INDEX: 26.07 KG/M2

## 2018-02-02 NOTE — PRE-OP CHECKLIST - ANTIBIOTIC
Patient received A+Ox4 in the chair, very fidgety, and anxious. Vapotherm applied per Eun RT for hypoxia.  As the night progressed the patient started to become increasingly anxious, more dyspneic, respiratory rates 30s, and utilizing accessory muscles yes

## 2018-05-03 ENCOUNTER — APPOINTMENT (OUTPATIENT)
Dept: HEART AND VASCULAR | Facility: CLINIC | Age: 68
End: 2018-05-03
Payer: COMMERCIAL

## 2018-05-03 VITALS
DIASTOLIC BLOOD PRESSURE: 79 MMHG | HEART RATE: 88 BPM | WEIGHT: 175 LBS | BODY MASS INDEX: 25.92 KG/M2 | HEIGHT: 69 IN | SYSTOLIC BLOOD PRESSURE: 152 MMHG

## 2018-05-03 PROCEDURE — 93280 PM DEVICE PROGR EVAL DUAL: CPT

## 2018-11-08 ENCOUNTER — APPOINTMENT (OUTPATIENT)
Dept: HEART AND VASCULAR | Facility: CLINIC | Age: 68
End: 2018-11-08
Payer: COMMERCIAL

## 2018-11-08 VITALS
BODY MASS INDEX: 25.92 KG/M2 | WEIGHT: 175 LBS | HEART RATE: 66 BPM | SYSTOLIC BLOOD PRESSURE: 161 MMHG | DIASTOLIC BLOOD PRESSURE: 78 MMHG | HEIGHT: 69 IN

## 2018-11-08 PROCEDURE — 93280 PM DEVICE PROGR EVAL DUAL: CPT

## 2018-11-08 PROCEDURE — 99215 OFFICE O/P EST HI 40 MIN: CPT | Mod: 25

## 2018-11-12 NOTE — PROCEDURE
[No] : not [A-V Junctional Rhythm] : A-V junctional rhythm [Pacemaker] : pacemaker [DDD] : DDD [Threshold Testing Performed] : Threshold testing was performed [Lead Imp:  ___ohms] : lead impedance was [unfilled] ohms [Sensing Amplitude ___mv] : sensing amplitude was [unfilled] mv [___V @] : [unfilled] V [___ ms] : [unfilled] ms [None] : none [de-identified] : [employer]  [de-identified] : Assurity MRI [de-identified] : [id number] [de-identified] : 12/2017 [de-identified] : 50/140 [de-identified] : 10 years [de-identified] : AP <1%\par  56%\par episodes reviewed some AT and some paroxysmal atrial fibrillation

## 2018-11-12 NOTE — DISCUSSION/SUMMARY
[Pacemaker Function Normal] : normal pacemaker function [FreeTextEntry1] : 67 year old male with arthritis, hepatitis C with residual cirrhosis and complete heart block s/p PPM 12/2017, who presents for follow up.  Device interrogation reveals normal function. All measured data is within normal limits. Events reviewed consistent with short episodes of atrial fibrillation.  he is asymptomatic from this.  We discussed in great detail what atrial fibrillation is and the association with stroke.  His CHADS score is 1 and I offered him Eliquis; which he would like to start.  We will continue to monitor his burden and went over symptoms he may experience while in atrial fibrillation.  He will follow up in 2 months or sooner if needed and knows to call with any questions or concerns.

## 2018-11-12 NOTE — HISTORY OF PRESENT ILLNESS
[Palpitations] : no palpitations [SOB] : no dyspnea [Syncope] : no syncope [Dizziness] : no dizziness [Chest Pain] : no chest pain or discomfort [Shoulder Pain] : no shoulder pain [Pain at Site] : no pain at device site [Erythema at Site] : no erythema at device site [Swelling at Site] : no swelling at device site [de-identified] : 68  year old male with arthritis, hepatitis C with residual cirrhosis and complete heart block s/p PPM 12/2017, who presents for follow up.  \par \par He was admitted to [facility] 12/2017 with fatigue and was found to be in complete heart block with a escape in the 30s. \par \par He presents for routine follow up today and overall states he is doing well.  He has no device related complaints.  He denies any chest pain, SOB, palpitations, orthopnea, PND, peripheral edema. \par \par

## 2018-11-12 NOTE — PHYSICAL EXAM
[General Appearance - Well Developed] : well developed [Normal Appearance] : normal appearance [Well Groomed] : well groomed [General Appearance - Well Nourished] : well nourished [No Deformities] : no deformities [General Appearance - In No Acute Distress] : no acute distress [Heart Rate And Rhythm] : heart rate and rhythm were normal [Heart Sounds] : normal S1 and S2 [Edema] : no peripheral edema present [Respiration, Rhythm And Depth] : normal respiratory rhythm and effort [Left Infraclavicular] : left infraclavicular area [Clean] : clean [Dry] : dry [Well-Healed] : well-healed [Palpable Crepitus] : no palpable crepitus [Bleeding] : no active bleeding [Foul Odor] : no foul smell [Purulent Drainage] : no purulent drainage [Serosanguineous Drainage] : no serosanquineous drainage [Serous Drainage] : no serous drainage [Erythema] : not erythematous [Warm] : not warm [Tender] : not tender [Indurated] : not indurated [Fluctuant] : not fluctuant [] : no ischemic changes

## 2019-02-07 ENCOUNTER — APPOINTMENT (OUTPATIENT)
Dept: HEART AND VASCULAR | Facility: CLINIC | Age: 69
End: 2019-02-07
Payer: COMMERCIAL

## 2019-02-07 VITALS
HEART RATE: 84 BPM | BODY MASS INDEX: 27 KG/M2 | WEIGHT: 168 LBS | HEIGHT: 66 IN | SYSTOLIC BLOOD PRESSURE: 151 MMHG | DIASTOLIC BLOOD PRESSURE: 70 MMHG

## 2019-02-07 PROCEDURE — 99213 OFFICE O/P EST LOW 20 MIN: CPT | Mod: 25

## 2019-02-07 PROCEDURE — 93280 PM DEVICE PROGR EVAL DUAL: CPT

## 2019-05-21 NOTE — DISCHARGE NOTE ADULT - INSTRUCTIONS
Freddy Dumont from [facility] to inform us that patient as discharged from [place] [address] on 5-.
Informed Dr Hayes Austin
You were admitted to the hospital for abnormal heart rhythm after having an EKG in your cardiologist's office. You were seen by the Electrophysiologist (rhythm specialist) as your EKG showed a complete heart block requiring a pacemaker placement. Prior to the device implantation, you had a fever and given your hx of long standing productive cough, you were treated for Community Acquired Pneumonia. You will  need to take two more doses for a total of 5 days. Also, you have a localized rash to lower back and buttock. You can take Hydrocortisone at home as needed for itching. If the rash becomes worse, please stop the antibiotics and notify your PMD. You may shower today, no tub baths or swimming for 4 weeks. Don't raise your arm above your head and no heaving lifting (more than 10 pounds) for 4 weeks. No driving for 4 weeks. Monitor your pacemaker site: if you noticed any swelling, drainage, increased pain, or fever 101 or greater, please notify your cardiologist.

## 2019-06-03 ENCOUNTER — APPOINTMENT (OUTPATIENT)
Dept: PULMONOLOGY | Facility: CLINIC | Age: 69
End: 2019-06-03
Payer: COMMERCIAL

## 2019-06-03 VITALS
TEMPERATURE: 97.9 F | WEIGHT: 168 LBS | HEIGHT: 69.5 IN | BODY MASS INDEX: 24.32 KG/M2 | HEART RATE: 82 BPM | DIASTOLIC BLOOD PRESSURE: 60 MMHG | SYSTOLIC BLOOD PRESSURE: 113 MMHG | OXYGEN SATURATION: 98 %

## 2019-06-03 PROCEDURE — 95012 NITRIC OXIDE EXP GAS DETER: CPT

## 2019-06-03 PROCEDURE — 94010 BREATHING CAPACITY TEST: CPT

## 2019-06-03 PROCEDURE — 99244 OFF/OP CNSLTJ NEW/EST MOD 40: CPT | Mod: 25

## 2019-06-03 RX ORDER — SILDENAFIL 100 MG/1
100 TABLET, FILM COATED ORAL
Qty: 10 | Refills: 0 | Status: ACTIVE | COMMUNITY
Start: 2018-11-19

## 2019-06-03 RX ORDER — LIDOCAINE 5 G/100G
5 OINTMENT TOPICAL
Qty: 50 | Refills: 0 | Status: ACTIVE | COMMUNITY
Start: 2018-06-08

## 2019-06-03 RX ORDER — EPINEPHRINE 0.3 MG/.3ML
0.3 INJECTION INTRAMUSCULAR
Qty: 2 | Refills: 0 | Status: ACTIVE | COMMUNITY
Start: 2018-12-11

## 2019-06-03 RX ORDER — IBUPROFEN AND FAMOTIDINE 800; 26.6 MG/1; MG/1
800-26.6 TABLET, COATED ORAL
Qty: 90 | Refills: 0 | Status: ACTIVE | COMMUNITY
Start: 2018-06-26

## 2019-06-03 RX ORDER — AZITHROMYCIN 250 MG/1
250 TABLET, FILM COATED ORAL
Qty: 6 | Refills: 0 | Status: DISCONTINUED | COMMUNITY
Start: 2019-03-19 | End: 2019-06-03

## 2019-06-03 RX ORDER — HYDROCODONE BITARTRATE AND HOMATROPINE METHYLBROMIDE 5; 1.5 MG/5ML; MG/5ML
5-1.5 SOLUTION ORAL
Qty: 20 | Refills: 0 | Status: ACTIVE | COMMUNITY
Start: 2019-03-19

## 2019-06-03 RX ORDER — LORAZEPAM 1 MG/1
1 TABLET ORAL
Qty: 30 | Refills: 0 | Status: ACTIVE | COMMUNITY
Start: 2019-03-19

## 2019-06-03 RX ORDER — IPRATROPIUM BROMIDE 42 UG/1
0.06 SPRAY NASAL
Qty: 15 | Refills: 0 | Status: ACTIVE | COMMUNITY
Start: 2019-05-13

## 2019-06-03 RX ORDER — OXYCODONE AND ACETAMINOPHEN 5; 325 MG/1; MG/1
5-325 TABLET ORAL
Qty: 20 | Refills: 0 | Status: ACTIVE | COMMUNITY
Start: 2019-01-22

## 2019-06-03 RX ORDER — DICLOFENAC SODIUM 20 MG/G
2 SOLUTION TOPICAL
Qty: 112 | Refills: 0 | Status: ACTIVE | COMMUNITY
Start: 2018-06-08

## 2019-06-03 RX ORDER — HYDROCODONE BITARTRATE AND ACETAMINOPHEN 5; 325 MG/1; MG/1
5-325 TABLET ORAL
Qty: 20 | Refills: 0 | Status: ACTIVE | COMMUNITY
Start: 2019-02-06

## 2019-06-05 ENCOUNTER — APPOINTMENT (OUTPATIENT)
Dept: PULMONOLOGY | Facility: CLINIC | Age: 69
End: 2019-06-05
Payer: COMMERCIAL

## 2019-06-05 PROCEDURE — 94070 EVALUATION OF WHEEZING: CPT

## 2019-06-05 PROCEDURE — 95070 INHLJ BRNCL CHALLENGE TSTG: CPT

## 2019-06-07 NOTE — PHYSICAL EXAM
[General Appearance - Well Developed] : well developed [General Appearance - Well Nourished] : well nourished [General Appearance - In No Acute Distress] : no acute distress [Normal Conjunctiva] : the conjunctiva exhibited no abnormalities [Normal Oropharynx] : normal oropharynx [Heart Rate And Rhythm] : heart rate and rhythm were normal [Murmurs] : no murmurs present [Heart Sounds] : normal S1 and S2 [Edema] : no peripheral edema present [Auscultation Breath Sounds / Voice Sounds] : lungs were clear to auscultation bilaterally [Abdomen Soft] : soft [Bowel Sounds] : normal bowel sounds [Abdomen Tenderness] : non-tender [Nail Clubbing] : no clubbing of the fingernails [Affect] : the affect was normal [] : no rash [Cyanosis, Localized] : no localized cyanosis [FreeTextEntry1] : no LAD

## 2019-06-07 NOTE — REVIEW OF SYSTEMS
[Postnasal Drip] : postnasal drip [Reflux] : reflux [As Noted in HPI] : as noted in HPI [Negative] : Endocrine [FreeTextEntry9] : PPM

## 2019-06-07 NOTE — ASSESSMENT
[FreeTextEntry1] : Data reviewed:\par \par CT chest 5/24/2019 personally reviewed : There is mild by apical scar reflecting prior granulomatous exposure. Minimal scar at the left lung base is also unchanged. There is no significant or suspicious pulmonary nodule. Mild bronchiectasis is without significant interval change.\par \par Jack 06/03/2019 : normal / FENO 19\par \par Impression:\par Chronic cough\par Seems to have possible elements of PND, asthma and GERD\par The bronchiectasis is mild and possibly not the cause of the cough per se\par \par Plan:\par Return for methacholine challenge.\par If positive, treat for asthma.\par If negative, empiric treatment or further investigation for GERD.\par Continue nasal Atrovent.\par --\par MCT 6/5/19 negative for BHR\par Could still trial inhalers given the bronchiectasis, but suggest eval or empiric tx for GERD.\par Spoke to pt 6/7 to review above. I do not think the mild bronchiectasis on the scan is the likeliest cause of the cough. Offered trial of inhalers anyway, but would pursue GERD as possible etiology first. He agrees and will review w Dr Alvarado. If this doesn't lead to relief of cough, return for consideration of inhalers, treatment as neurogenic cough.

## 2019-06-07 NOTE — CONSULT LETTER
[Dear  ___] : Dear  [unfilled], [( Thank you for referring [unfilled] for consultation for _____ )] : Thank you for referring [unfilled] for consultation for [unfilled] [Please see my note below.] : Please see my note below. [Consult Closing:] : Thank you very much for allowing me to participate in the care of this patient.  If you have any questions, please do not hesitate to contact me. [Sincerely,] : Sincerely, [FreeTextEntry2] : Michael Alvarado MD\par [address] \[place] [address] [FreeTextEntry3] : Olivia Hunt MD, FCCP\par

## 2019-06-07 NOTE — HISTORY OF PRESENT ILLNESS
[FreeTextEntry1] : 06/03/2019: Here w wife. Asked to evaluate patient by Dr Michael Alvarado for intractable cough. He's been coughing on and off for months, intermittently productive of mucus and associated with chest tightness. No dyspnea. No wheeze. Used to have horrible allergies, used inhalers, would require steroids once a year. This seemed to remit 10-15 years ago. 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 Wife notes snoring and apneas; he has mild BHAVIN. Used to smoke socially. Years ago was treated for reflux and these symptoms seem to have recurred recently. Dr Alvarado recently gave him Atrovent nasal spray which seems to have helped. Has also had Abx for sinusitis.\par

## 2019-06-13 NOTE — HISTORY OF PRESENT ILLNESS
[Palpitations] : no palpitations [SOB] : no dyspnea [Syncope] : no syncope [Dizziness] : no dizziness [Chest Pain] : no chest pain or discomfort [Shoulder Pain] : no shoulder pain [Pain at Site] : no pain at device site [Swelling at Site] : no swelling at device site [Erythema at Site] : no erythema at device site [de-identified] : 68  year old male with arthritis, hepatitis C with residual cirrhosis and complete heart block s/p PPM 12/2017, who presents for follow up.  \par \par He was admitted to [facility] 12/2017 with fatigue and was found to be in complete heart block with a escape in the 30s. \par \par He presents for routine follow up today and overall states he is doing well.  He has no device related complaints.  He denies any chest pain, SOB, palpitations, orthopnea, PND, peripheral edema. He remains on Eliquis but feels that he may not need this.  \par \par

## 2019-06-13 NOTE — REVIEW OF SYSTEMS
[see HPI] : see HPI [Negative] : Heme/Lymph [Fever] : no fever [Chills] : no chills [Recent Weight Gain (___ Lbs)] : no recent weight gain [Feeling Fatigued] : not feeling fatigued [Recent Weight Loss (___ Lbs)] : no recent weight loss

## 2019-06-13 NOTE — ADDENDUM
[FreeTextEntry1] : Patient called that he is undergoing a procedure to remove skin cancer.   He is on ELiquis for paroxysmal atrial fibrillation with a very low burden (last visit <1%).  He is cleared to stop 2-3 days prior to his procedure and should restart it as soon as he is cleared from a surgical standpoint (2 days is adequate but if surgeon wants 3 this is ok).  He is not pacemaker-dependent and the device is unlikely to require periprocedural reprogramming given the nature of the upcoming procedure.  However, if bipolar cautery is to be used within 6 inches of the device, please consider applying a magnet to the device (will make asynchronous given intermittent junctional rates).  Attempt to avoid the use of monopolar cautery.  Any questions please call [phone].\par \par

## 2019-06-13 NOTE — PROCEDURE
[No] : not [A-V Junctional Rhythm] : A-V junctional rhythm [Pacemaker] : pacemaker [DDD] : DDD [Threshold Testing Performed] : Threshold testing was performed [Lead Imp:  ___ohms] : lead impedance was [unfilled] ohms [Sensing Amplitude ___mv] : sensing amplitude was [unfilled] mv [___V @] : [unfilled] V [___ ms] : [unfilled] ms [None] : none [de-identified] : [id number] [de-identified] : [employer]  [de-identified] : Assurity MRI [de-identified] : 12/2017 [de-identified] : 50/140 [de-identified] : AP <1%\par  40%\par episodes reviewed some AT and some paroxysmal atrial fibrillation - burden <1% [de-identified] : 9.6-10.2 years

## 2019-06-13 NOTE — DISCUSSION/SUMMARY
[Pacemaker Function Normal] : normal pacemaker function [FreeTextEntry1] : 68 year old male with arthritis, hepatitis C with residual cirrhosis and complete heart block s/p PPM 12/2017, who presents for follow up.  Device interrogation reveals normal function. All measured data is within normal limits. We discussed in great detail what atrial fibrillation is and I showed him his recorded episodes.  I explained the stroke risk and he is amenable to continuing Eliquis.  He will follow up in 6months or sooner if needed and knows to call with any questions or concerns.

## 2019-06-13 NOTE — PHYSICAL EXAM
[General Appearance - Well Developed] : well developed [Normal Appearance] : normal appearance [Well Groomed] : well groomed [General Appearance - Well Nourished] : well nourished [No Deformities] : no deformities [General Appearance - In No Acute Distress] : no acute distress [Heart Rate And Rhythm] : heart rate and rhythm were normal [Heart Sounds] : normal S1 and S2 [Edema] : no peripheral edema present [] : no respiratory distress [Respiration, Rhythm And Depth] : normal respiratory rhythm and effort [Left Infraclavicular] : left infraclavicular area [Clean] : clean [Dry] : dry [Well-Healed] : well-healed [Cyanosis, Localized] : no localized cyanosis [Palpable Crepitus] : no palpable crepitus [Foul Odor] : no foul smell [Bleeding] : no active bleeding [Serosanguineous Drainage] : no serosanquineous drainage [Purulent Drainage] : no purulent drainage [Erythema] : not erythematous [Serous Drainage] : no serous drainage [Tender] : not tender [Warm] : not warm [Indurated] : not indurated [Fluctuant] : not fluctuant

## 2019-08-22 ENCOUNTER — APPOINTMENT (OUTPATIENT)
Dept: HEART AND VASCULAR | Facility: CLINIC | Age: 69
End: 2019-08-22
Payer: COMMERCIAL

## 2019-08-22 VITALS
WEIGHT: 168 LBS | DIASTOLIC BLOOD PRESSURE: 71 MMHG | HEART RATE: 87 BPM | SYSTOLIC BLOOD PRESSURE: 148 MMHG | HEIGHT: 69.5 IN | BODY MASS INDEX: 24.32 KG/M2

## 2019-08-22 PROCEDURE — 93280 PM DEVICE PROGR EVAL DUAL: CPT

## 2019-08-22 PROCEDURE — 99212 OFFICE O/P EST SF 10 MIN: CPT | Mod: 25

## 2019-08-23 NOTE — ADDENDUM
[FreeTextEntry1] : Patient called that he is undergoing a procedure to remove skin cancer.   He is on ELiquis for paroxysmal atrial fibrillation with a very low burden (last visit <1%).  He is cleared to stop 2-3 days prior to his procedure and should restart it as soon as he is cleared from a surgical standpoint (2 days is adequate but if surgeon wants 3 this is ok).  He is not pacemaker-dependent and the device is unlikely to require periprocedural reprogramming given the nature of the upcoming procedure.  However, if bipolar cautery is to be used within 6 inches of the device, please consider applying a magnet to the device (will make asynchronous given intermittent junctional rates).  Attempt to avoid the use of monopolar cautery.  Any questions please call [phone].\par \par

## 2019-08-23 NOTE — PHYSICAL EXAM
[General Appearance - Well Developed] : well developed [Well Groomed] : well groomed [Normal Appearance] : normal appearance [General Appearance - Well Nourished] : well nourished [General Appearance - In No Acute Distress] : no acute distress [No Deformities] : no deformities [Heart Sounds] : normal S1 and S2 [Heart Rate And Rhythm] : heart rate and rhythm were normal [Edema] : no peripheral edema present [] : no respiratory distress [Respiration, Rhythm And Depth] : normal respiratory rhythm and effort [Left Infraclavicular] : left infraclavicular area [Clean] : clean [Dry] : dry [Well-Healed] : well-healed [Exaggerated Use Of Accessory Muscles For Inspiration] : no accessory muscle use [Palpable Crepitus] : no palpable crepitus [Bleeding] : no active bleeding [Foul Odor] : no foul smell [Purulent Drainage] : no purulent drainage [Serosanguineous Drainage] : no serosanquineous drainage [Serous Drainage] : no serous drainage [Erythema] : not erythematous [Warm] : not warm [Tender] : not tender [Indurated] : not indurated [Fluctuant] : not fluctuant

## 2019-08-23 NOTE — HISTORY OF PRESENT ILLNESS
[Palpitations] : no palpitations [SOB] : no dyspnea [Syncope] : no syncope [Dizziness] : no dizziness [Chest Pain] : no chest pain or discomfort [Shoulder Pain] : no shoulder pain [Pain at Site] : no pain at device site [Erythema at Site] : no erythema at device site [Swelling at Site] : no swelling at device site [de-identified] : 68  year old male with arthritis, hepatitis C with residual cirrhosis and complete heart block s/p PPM 12/2017, who presents for follow up.  \par \par He was admitted to [facility] 12/2017 with fatigue and was found to be in complete heart block with a escape in the 30s. \par \par He presents for routine follow up today and overall states he is doing well.  He has no device related complaints.  He has been complaining of a mid sternal chest pain that occurs with movement.  It is non exertional and reproducible with palpitation.  HE had a CT chest that was unremarkable.  He does not see a cardiologist; but is seeing a pulmonologist.  He denies any SOB, palpitations, orthopnea, PND, peripheral edema. He remains on Eliquis \par \par

## 2019-08-23 NOTE — REVIEW OF SYSTEMS
[Negative] : Psychiatric [Fever] : no fever [Recent Weight Gain (___ Lbs)] : no recent weight gain [Chills] : no chills [Feeling Fatigued] : not feeling fatigued [Recent Weight Loss (___ Lbs)] : no recent weight loss [Cough] : no cough [see HPI] : see HPI

## 2019-08-23 NOTE — PROCEDURE
[No] : not [A-V Junctional Rhythm] : A-V junctional rhythm [Pacemaker] : pacemaker [DDD] : DDD [Threshold Testing Performed] : Threshold testing was performed [Lead Imp:  ___ohms] : lead impedance was [unfilled] ohms [___V @] : [unfilled] V [Sensing Amplitude ___mv] : sensing amplitude was [unfilled] mv [None] : none [___ ms] : [unfilled] ms [de-identified] : [employer]  [de-identified] : Assurity MRI [de-identified] : [id number] [de-identified] : 12/2017 [de-identified] : 50/140 [de-identified] : 9.6-10.2 years [de-identified] : AP <1%\par  77%\par short episodes of AT, one afib with RV pacing Afib burden <1%\par 3 episodes of NSVT

## 2019-08-23 NOTE — DISCUSSION/SUMMARY
[Pacemaker Function Normal] : normal pacemaker function [FreeTextEntry1] : 68 year old male with arthritis, hepatitis C with residual cirrhosis and complete heart block s/p PPM 12/2017, who presents for follow up.  Device interrogation reveals normal function. All measured data is within normal limits. We again discussed in great detail what atrial fibrillation is and I showed him that he has had recurrent episodes and while they are short he should remain on Eliquis.  HE has chest pain that sounds musculoskeletal in nature and has a history of arthritis.  HE does not see a general cardiologist but states his PMD does echocardiograms and stress test; but he can not remember the last time he had one.  I have recommended an echocardiogram in the next few months and told him that if his PMD does routine cardiology testing that is fine and if not he will see a general cardiologist.  HE will follow up with me in 6 months for routine device check.   He knows to call with any questions or concerns.

## 2019-10-14 ENCOUNTER — OUTPATIENT (OUTPATIENT)
Dept: OUTPATIENT SERVICES | Facility: HOSPITAL | Age: 69
LOS: 1 days | Discharge: HOME | End: 2019-10-14
Payer: COMMERCIAL

## 2019-10-14 ENCOUNTER — RESULT REVIEW (OUTPATIENT)
Age: 69
End: 2019-10-14

## 2019-10-14 DIAGNOSIS — Z98.89 OTHER SPECIFIED POSTPROCEDURAL STATES: Chronic | ICD-10-CM

## 2019-10-14 PROCEDURE — 88305 TISSUE EXAM BY PATHOLOGIST: CPT

## 2019-10-14 PROCEDURE — C1889: CPT

## 2019-10-14 PROCEDURE — 45385 COLONOSCOPY W/LESION REMOVAL: CPT

## 2019-10-15 LAB — SURGICAL PATHOLOGY STUDY: SIGNIFICANT CHANGE UP

## 2019-11-14 NOTE — PATIENT PROFILE ADULT. - VISION (WITH CORRECTIVE LENSES IF THE PATIENT USUALLY WEARS THEM):
November 14, 2019      Selvin Rockwell  [address] [place] [id number]          Dear Selvin,    We are contacting you because our records show you were due for an INR on 11-14-19.      There are potentially serious risks when taking warfarin without careful monitoring, and we want to make sure you are safely managed.    Please call the INR clinic at [phone] or [phone] and we will be happy to help you schedule an appointment.  If there has been a change in your care, or other concerns, please let us know so we can help and/or update our records.    Sincerely,        KIKA Jon CNP / Rosamaria Bowen RN / INR Clinic           Normal vision: sees adequately in most situations; can see medication labels, newsprint

## 2020-02-13 ENCOUNTER — APPOINTMENT (OUTPATIENT)
Dept: HEART AND VASCULAR | Facility: CLINIC | Age: 70
End: 2020-02-13
Payer: COMMERCIAL

## 2020-02-13 VITALS
WEIGHT: 165 LBS | HEART RATE: 76 BPM | DIASTOLIC BLOOD PRESSURE: 77 MMHG | BODY MASS INDEX: 23.89 KG/M2 | SYSTOLIC BLOOD PRESSURE: 150 MMHG | HEIGHT: 69.5 IN

## 2020-02-13 PROCEDURE — 93280 PM DEVICE PROGR EVAL DUAL: CPT

## 2020-02-20 NOTE — HISTORY OF PRESENT ILLNESS
[Palpitations] : no palpitations [Syncope] : no syncope [SOB] : no dyspnea [Dizziness] : no dizziness [Shoulder Pain] : no shoulder pain [Chest Pain] : no chest pain or discomfort [Pain at Site] : no pain at device site [Erythema at Site] : no erythema at device site [Swelling at Site] : no swelling at device site [de-identified] : 69 year old male with arthritis, hepatitis C with residual cirrhosis and complete heart block s/p PPM 12/2017, who presents for follow up.  \par \par He was admitted to [facility] 12/2017 with fatigue and was found to be in complete heart block with a escape in the 30s. \par \par He presents for routine follow up today and overall states he is doing well.  He has no device related issues,  He denies any SOB, palpitations, orthopnea, PND, peripheral edema or chest pain. He remains on Eliquis \par \par

## 2020-02-20 NOTE — PHYSICAL EXAM
[General Appearance - Well Developed] : well developed [Normal Appearance] : normal appearance [Well Groomed] : well groomed [General Appearance - Well Nourished] : well nourished [No Deformities] : no deformities [General Appearance - In No Acute Distress] : no acute distress [Heart Rate And Rhythm] : heart rate and rhythm were normal [Heart Sounds] : normal S1 and S2 [Edema] : no peripheral edema present [] : no respiratory distress [Respiration, Rhythm And Depth] : normal respiratory rhythm and effort [Exaggerated Use Of Accessory Muscles For Inspiration] : no accessory muscle use [Clean] : clean [Left Infraclavicular] : left infraclavicular area [Dry] : dry [Well-Healed] : well-healed [Abdomen Soft] : soft [Palpable Crepitus] : no palpable crepitus [Bleeding] : no active bleeding [Foul Odor] : no foul smell [Serosanguineous Drainage] : no serosanquineous drainage [Purulent Drainage] : no purulent drainage [Serous Drainage] : no serous drainage [Erythema] : not erythematous [Warm] : not warm [Tender] : not tender [Indurated] : not indurated [Fluctuant] : not fluctuant

## 2020-02-20 NOTE — DISCUSSION/SUMMARY
[Pacemaker Function Normal] : normal pacemaker function [FreeTextEntry1] : 69 year old male with arthritis, hepatitis C with residual cirrhosis and complete heart block s/p PPM 12/2017, who presents for follow up.  Device interrogation reveals normal function. All measured data is within normal limits. Overall he is doing well with a minimal afib burden.  He will remain on Eliquis.  No changes made today.  He will follow up with me in 6 months for routine device check.   He knows to call with any questions or concerns.

## 2020-02-20 NOTE — PROCEDURE
[No] : not [A-V Junctional Rhythm] : A-V junctional rhythm [Pacemaker] : pacemaker [DDD] : DDD [Threshold Testing Performed] : Threshold testing was performed [Lead Imp:  ___ohms] : lead impedance was [unfilled] ohms [Sensing Amplitude ___mv] : sensing amplitude was [unfilled] mv [___ ms] : [unfilled] ms [___V @] : [unfilled] V [None] : none [de-identified] : [employer]  [de-identified] : [id number] [de-identified] : Assurity MRI [de-identified] : 50/140 [de-identified] : 12/2017 [de-identified] : 9.9-10.2 years [de-identified] : AP <1%\par  86%\par  Afib burden <1%\par

## 2020-08-20 ENCOUNTER — APPOINTMENT (OUTPATIENT)
Dept: HEART AND VASCULAR | Facility: CLINIC | Age: 70
End: 2020-08-20
Payer: COMMERCIAL

## 2020-08-20 VITALS
SYSTOLIC BLOOD PRESSURE: 138 MMHG | BODY MASS INDEX: 23.89 KG/M2 | HEIGHT: 69.5 IN | OXYGEN SATURATION: 97 % | HEART RATE: 75 BPM | DIASTOLIC BLOOD PRESSURE: 82 MMHG | TEMPERATURE: 98.3 F | WEIGHT: 165 LBS

## 2020-08-20 PROCEDURE — 93280 PM DEVICE PROGR EVAL DUAL: CPT

## 2020-08-31 NOTE — REVIEW OF SYSTEMS
[see HPI] : see HPI [Negative] : Heme/Lymph [Fever] : no fever [Chills] : no chills [Feeling Fatigued] : not feeling fatigued Perilesional Excision Additional Text (Leave Blank If You Do Not Want): The margin was drawn around the clinically apparent lesion. Incisions were then made along these lines to the appropriate tissue plane and the lesion was extirpated.

## 2021-03-23 NOTE — ADDENDUM
[FreeTextEntry1] : \par 3/23/21 \par Called that patient needs clearance for knee surgery.  Last seen in clinic 8/2020 - please see office note above.  \par He is pacemaker dependant.  He is cleared from an electrophysiology standpoint to proceed with his upcoming knee surgery.  He can hold Eliquis 2-3 days prior and it should be resumed as soon as he is cleared form a surgical perspective.  The device is unlikely to require paty-procedural reprogramming given the nature of his upcoming procedure.  However, if bipolar cautery is to be used within 6 inches of the device, please apply a magnet to the device which will make it asynchronous.  Attempt to avoid the use of monopolar cautery.\par \par Any questions please call [phone]\par \par  I, Edmund German, hereby attest that the medical record entry for today accurately signatures/notations that I made in my capacity as Attending Physician when I treated/diagnosed the above patient.  I do hereby attest that this information is true, accurate and complete to the best of my knowledge and I understand that any falsification, omission, or concealment of material fact may subject me to administrative, civil, or criminal liability. I was present for the entire visit with the patient and I was involved in all the critical and key components of the visit.

## 2021-03-23 NOTE — PROCEDURE
[Complete Heart Block] : complete heart block [Pacemaker] : pacemaker [Threshold Testing Performed] : Threshold testing was performed [DDD] : DDD [Lead Imp:  ___ohms] : lead impedance was [unfilled] ohms [Sensing Amplitude ___mv] : sensing amplitude was [unfilled] mv [___V @] : [unfilled] V [___ ms] : [unfilled] ms [None] : none [de-identified] : St Brayan  [de-identified] : Assurity MRI [de-identified] : [id number] [de-identified] : 12/2017 [de-identified] : 50/140 [de-identified] : 10.2 years [de-identified] : AP <1%\par  90%\par all episodes short bursts of AT lasting seconds

## 2021-03-23 NOTE — HISTORY OF PRESENT ILLNESS
[Palpitations] : no palpitations [SOB] : no dyspnea [Syncope] : no syncope [Dizziness] : no dizziness [Chest Pain] : no chest pain or discomfort [Shoulder Pain] : no shoulder pain [Pain at Site] : no pain at device site [Erythema at Site] : no erythema at device site [Swelling at Site] : no swelling at device site [de-identified] : 69 year old male with arthritis, hepatitis C with residual cirrhosis and complete heart block s/p PPM 12/2017, who presents for follow up.  \par \par He was admitted to [facility] 12/2017 with fatigue and was found to be in complete heart block with a escape in the 30s. \par \par He presents for routine follow up today and overall states he is doing well.  He has no device related issues,  He denies any SOB, palpitations, orthopnea, PND, peripheral edema or chest pain. He remains on Eliquis. He has been under a lot of stress because his wife is sick. \par \par

## 2021-03-23 NOTE — REVIEW OF SYSTEMS
[see HPI] : see HPI [Under Stress] : under stress [Negative] : Heme/Lymph [Fever] : no fever [Chills] : no chills [Feeling Fatigued] : not feeling fatigued

## 2021-03-23 NOTE — DISCUSSION/SUMMARY
[Pacemaker Function Normal] : normal pacemaker function [FreeTextEntry1] : 69 year old male with arthritis, hepatitis C with residual cirrhosis and complete heart block s/p PPM 12/2017, who presents for follow up.  Device interrogation reveals normal function. All measured data is within normal limits. Overall he is doing well with a minimal afib burden.  He will remain on Eliquis.  No changes made today.  He will follow up with me in 6 months for sooner if needed.   He knows to call with any questions or concerns.

## 2021-04-01 ENCOUNTER — APPOINTMENT (OUTPATIENT)
Dept: HEART AND VASCULAR | Facility: CLINIC | Age: 71
End: 2021-04-01

## 2021-08-26 ENCOUNTER — APPOINTMENT (OUTPATIENT)
Dept: HEART AND VASCULAR | Facility: CLINIC | Age: 71
End: 2021-08-26
Payer: MEDICARE

## 2021-08-26 VITALS
WEIGHT: 146 LBS | TEMPERATURE: 97.5 F | HEIGHT: 69.5 IN | DIASTOLIC BLOOD PRESSURE: 80 MMHG | HEART RATE: 94 BPM | SYSTOLIC BLOOD PRESSURE: 140 MMHG | BODY MASS INDEX: 21.14 KG/M2

## 2021-08-26 PROCEDURE — 93280 PM DEVICE PROGR EVAL DUAL: CPT

## 2021-08-30 NOTE — PROCEDURE
[Complete Heart Block] : complete heart block [Pacemaker] : pacemaker [DDD] : DDD [Threshold Testing Performed] : Threshold testing was performed [Lead Imp:  ___ohms] : lead impedance was [unfilled] ohms [Sensing Amplitude ___mv] : sensing amplitude was [unfilled] mv [___V @] : [unfilled] V [___ ms] : [unfilled] ms [None] : none [de-identified] : [employer]rayan  [de-identified] : Assurity MRI [de-identified] : [id number] [de-identified] : 12/2017 [de-identified] : 50/140 [de-identified] : 9.7*10.3 years [de-identified] : AP <1%\par  93%\par <1% afib

## 2021-08-30 NOTE — HISTORY OF PRESENT ILLNESS
[Palpitations] : no palpitations [SOB] : no dyspnea [Syncope] : no syncope [Dizziness] : no dizziness [Chest Pain] : no chest pain or discomfort [Shoulder Pain] : no shoulder pain [Pain at Site] : no pain at device site [Erythema at Site] : no erythema at device site [Swelling at Site] : no swelling at device site [de-identified] : 69 year old male with arthritis, hepatitis C with residual cirrhosis and complete heart block s/p PPM 12/2017, who presents for follow up.  \par \par He was admitted to [facility] 12/2017 with fatigue and was found to be in complete heart block with a escape in the 30s. \par \par He presents for routine follow up today and overall states he is doing well.  He has no device related issues,  He denies any SOB, palpitations, orthopnea, PND, peripheral edema or chest pain. He remains on Eliquis. He had a knee replacement earlier this year which he is still recovering from and states led to him loosing weight. \par \par

## 2021-08-30 NOTE — PHYSICAL EXAM
[General Appearance - Well Developed] : well developed [Normal Appearance] : normal appearance [Well Groomed] : well groomed [General Appearance - Well Nourished] : well nourished [No Deformities] : no deformities [General Appearance - In No Acute Distress] : no acute distress [Heart Rate And Rhythm] : heart rate and rhythm were normal [Heart Sounds] : normal S1 and S2 [Edema] : no peripheral edema present [] : no respiratory distress [Respiration, Rhythm And Depth] : normal respiratory rhythm and effort [Exaggerated Use Of Accessory Muscles For Inspiration] : no accessory muscle use [Left Infraclavicular] : left infraclavicular area [Clean] : clean [Dry] : dry [Well-Healed] : well-healed [Abdomen Soft] : soft [Palpable Crepitus] : no palpable crepitus [Bleeding] : no active bleeding [Foul Odor] : no foul smell [Purulent Drainage] : no purulent drainage [Serosanguineous Drainage] : no serosanquineous drainage [Serous Drainage] : no serous drainage [Erythema] : not erythematous [Warm] : not warm [Tender] : not tender [Indurated] : not indurated [Fluctuant] : not fluctuant

## 2021-08-30 NOTE — PHYSICAL EXAM
[General Appearance - Well Developed] : well developed [Normal Appearance] : normal appearance [Well Groomed] : well groomed [General Appearance - Well Nourished] : well nourished [No Deformities] : no deformities [General Appearance - In No Acute Distress] : no acute distress [Heart Rate And Rhythm] : heart rate and rhythm were normal [Heart Sounds] : normal S1 and S2 [Edema] : no peripheral edema present [] : no respiratory distress [Respiration, Rhythm And Depth] : normal respiratory rhythm and effort [Exaggerated Use Of Accessory Muscles For Inspiration] : no accessory muscle use [Left Infraclavicular] : left infraclavicular area [Clean] : clean [Dry] : dry [Well-Healed] : well-healed [Abdomen Soft] : soft [Palpable Crepitus] : no palpable crepitus [Bleeding] : no active bleeding [Foul Odor] : no foul smell [Purulent Drainage] : no purulent drainage [Serosanguineous Drainage] : no serosanquineous drainage [Serous Drainage] : no serous drainage [Erythema] : not erythematous [Warm] : not warm [Indurated] : not indurated [Tender] : not tender [Fluctuant] : not fluctuant

## 2021-08-30 NOTE — REVIEW OF SYSTEMS
[Joint Pain] : joint pain [Joint Swelling] : joint swelling [Negative] : Psychiatric [Fever] : no fever [Chills] : no chills [Feeling Fatigued] : not feeling fatigued [SOB] : no shortness of breath [Dyspnea on exertion] : not dyspnea during exertion [Palpitations] : no palpitations [Syncope] : no syncope [Cough] : no cough

## 2021-08-30 NOTE — PROCEDURE
[Complete Heart Block] : complete heart block [Pacemaker] : pacemaker [DDD] : DDD [Threshold Testing Performed] : Threshold testing was performed [Lead Imp:  ___ohms] : lead impedance was [unfilled] ohms [Sensing Amplitude ___mv] : sensing amplitude was [unfilled] mv [___V @] : [unfilled] V [___ ms] : [unfilled] ms [None] : none [de-identified] : [employer]rayan  [de-identified] : Assurity MRI [de-identified] : [id number] [de-identified] : 12/2017 [de-identified] : 50/140 [de-identified] : 9.7*10.3 years [de-identified] : AP <1%\par  93%\par <1% afib

## 2021-08-30 NOTE — HISTORY OF PRESENT ILLNESS
[Palpitations] : no palpitations [SOB] : no dyspnea [Syncope] : no syncope [Dizziness] : no dizziness [Chest Pain] : no chest pain or discomfort [Shoulder Pain] : no shoulder pain [Pain at Site] : no pain at device site [Erythema at Site] : no erythema at device site [Swelling at Site] : no swelling at device site [de-identified] : 69 year old male with arthritis, hepatitis C with residual cirrhosis and complete heart block s/p PPM 12/2017, who presents for follow up.  \par \par He was admitted to [facility] 12/2017 with fatigue and was found to be in complete heart block with a escape in the 30s. \par \par He presents for routine follow up today and overall states he is doing well.  He has no device related issues,  He denies any SOB, palpitations, orthopnea, PND, peripheral edema or chest pain. He remains on Eliquis. He had a knee replacement earlier this year which he is still recovering from and states led to him loosing weight. \par \par

## 2021-08-30 NOTE — ADDENDUM
[FreeTextEntry1] :  \par \par  I, Edmund German, hereby attest that the medical record entry for today accurately signatures/notations that I made in my capacity as Attending Physician when I treated/diagnosed the above patient.  I do hereby attest that this information is true, accurate and complete to the best of my knowledge and I understand that any falsification, omission, or concealment of material fact may subject me to administrative, civil, or criminal liability. I was present for the entire visit with the patient and I was involved in all the critical and key components of the visit.

## 2021-08-30 NOTE — DISCUSSION/SUMMARY
[Pacemaker Function Normal] : normal pacemaker function [FreeTextEntry1] : 70 year old male with arthritis, hepatitis C with residual cirrhosis and complete heart block s/p PPM 12/2017, who presents for follow up.  Device interrogation reveals normal function. All measured data is within normal limits. Overall he is doing well with a minimal afib burden and will remain on Eliquis.  No changes made today.  He will follow up with me in 6 months for sooner if needed.   He knows to call with any questions or concerns.

## 2022-03-06 ENCOUNTER — EMERGENCY (EMERGENCY)
Facility: HOSPITAL | Age: 72
LOS: 1 days | Discharge: HOME | End: 2022-03-06
Attending: EMERGENCY MEDICINE | Admitting: EMERGENCY MEDICINE
Payer: MEDICARE

## 2022-03-06 VITALS
SYSTOLIC BLOOD PRESSURE: 140 MMHG | DIASTOLIC BLOOD PRESSURE: 76 MMHG | HEART RATE: 87 BPM | WEIGHT: 149.91 LBS | OXYGEN SATURATION: 97 % | HEIGHT: 69 IN | RESPIRATION RATE: 18 BRPM | TEMPERATURE: 98 F

## 2022-03-06 VITALS
TEMPERATURE: 98 F | OXYGEN SATURATION: 98 % | SYSTOLIC BLOOD PRESSURE: 147 MMHG | RESPIRATION RATE: 18 BRPM | DIASTOLIC BLOOD PRESSURE: 76 MMHG

## 2022-03-06 DIAGNOSIS — M19.90 UNSPECIFIED OSTEOARTHRITIS, UNSPECIFIED SITE: ICD-10-CM

## 2022-03-06 DIAGNOSIS — K80.20 CALCULUS OF GALLBLADDER WITHOUT CHOLECYSTITIS WITHOUT OBSTRUCTION: ICD-10-CM

## 2022-03-06 DIAGNOSIS — E78.5 HYPERLIPIDEMIA, UNSPECIFIED: ICD-10-CM

## 2022-03-06 DIAGNOSIS — K74.60 UNSPECIFIED CIRRHOSIS OF LIVER: ICD-10-CM

## 2022-03-06 DIAGNOSIS — Z88.0 ALLERGY STATUS TO PENICILLIN: ICD-10-CM

## 2022-03-06 DIAGNOSIS — R10.11 RIGHT UPPER QUADRANT PAIN: ICD-10-CM

## 2022-03-06 DIAGNOSIS — Z91.013 ALLERGY TO SEAFOOD: ICD-10-CM

## 2022-03-06 DIAGNOSIS — Z95.0 PRESENCE OF CARDIAC PACEMAKER: ICD-10-CM

## 2022-03-06 DIAGNOSIS — Z98.89 OTHER SPECIFIED POSTPROCEDURAL STATES: Chronic | ICD-10-CM

## 2022-03-06 DIAGNOSIS — R00.0 TACHYCARDIA, UNSPECIFIED: ICD-10-CM

## 2022-03-06 DIAGNOSIS — Z20.822 CONTACT WITH AND (SUSPECTED) EXPOSURE TO COVID-19: ICD-10-CM

## 2022-03-06 DIAGNOSIS — Z88.8 ALLERGY STATUS TO OTHER DRUGS, MEDICAMENTS AND BIOLOGICAL SUBSTANCES: ICD-10-CM

## 2022-03-06 DIAGNOSIS — N40.0 BENIGN PROSTATIC HYPERPLASIA WITHOUT LOWER URINARY TRACT SYMPTOMS: ICD-10-CM

## 2022-03-06 LAB
ALBUMIN SERPL ELPH-MCNC: 3.8 G/DL — SIGNIFICANT CHANGE UP (ref 3.3–5)
ALP SERPL-CCNC: 110 U/L — SIGNIFICANT CHANGE UP (ref 40–120)
ALT FLD-CCNC: 29 U/L — SIGNIFICANT CHANGE UP (ref 10–45)
ANION GAP SERPL CALC-SCNC: 12 MMOL/L — SIGNIFICANT CHANGE UP (ref 5–17)
APPEARANCE UR: ABNORMAL
AST SERPL-CCNC: 26 U/L — SIGNIFICANT CHANGE UP (ref 10–40)
BACTERIA # UR AUTO: ABNORMAL /HPF
BASOPHILS # BLD AUTO: 0.02 K/UL — SIGNIFICANT CHANGE UP (ref 0–0.2)
BASOPHILS NFR BLD AUTO: 0.3 % — SIGNIFICANT CHANGE UP (ref 0–2)
BILIRUB SERPL-MCNC: 0.8 MG/DL — SIGNIFICANT CHANGE UP (ref 0.2–1.2)
BILIRUB UR-MCNC: ABNORMAL
BUN SERPL-MCNC: 12 MG/DL — SIGNIFICANT CHANGE UP (ref 7–23)
CALCIUM SERPL-MCNC: 9.4 MG/DL — SIGNIFICANT CHANGE UP (ref 8.4–10.5)
CHLORIDE SERPL-SCNC: 99 MMOL/L — SIGNIFICANT CHANGE UP (ref 96–108)
CO2 SERPL-SCNC: 27 MMOL/L — SIGNIFICANT CHANGE UP (ref 22–31)
COLOR SPEC: YELLOW — SIGNIFICANT CHANGE UP
COMMENT - URINE: SIGNIFICANT CHANGE UP
CREAT SERPL-MCNC: 0.67 MG/DL — SIGNIFICANT CHANGE UP (ref 0.5–1.3)
DIFF PNL FLD: NEGATIVE — SIGNIFICANT CHANGE UP
EGFR: 99 ML/MIN/1.73M2 — SIGNIFICANT CHANGE UP
EOSINOPHIL # BLD AUTO: 0.13 K/UL — SIGNIFICANT CHANGE UP (ref 0–0.5)
EOSINOPHIL NFR BLD AUTO: 1.9 % — SIGNIFICANT CHANGE UP (ref 0–6)
EPI CELLS # UR: SIGNIFICANT CHANGE UP /HPF (ref 0–5)
GLUCOSE SERPL-MCNC: 112 MG/DL — HIGH (ref 70–99)
GLUCOSE UR QL: NEGATIVE — SIGNIFICANT CHANGE UP
HCT VFR BLD CALC: 37 % — LOW (ref 39–50)
HGB BLD-MCNC: 12.6 G/DL — LOW (ref 13–17)
HYALINE CASTS # UR AUTO: SIGNIFICANT CHANGE UP /LPF (ref 0–2)
IMM GRANULOCYTES NFR BLD AUTO: 0.3 % — SIGNIFICANT CHANGE UP (ref 0–1.5)
KETONES UR-MCNC: ABNORMAL MG/DL
LACTATE SERPL-SCNC: 0.7 MMOL/L — SIGNIFICANT CHANGE UP (ref 0.5–2)
LEUKOCYTE ESTERASE UR-ACNC: NEGATIVE — SIGNIFICANT CHANGE UP
LIDOCAIN IGE QN: 16 U/L — SIGNIFICANT CHANGE UP (ref 7–60)
LYMPHOCYTES # BLD AUTO: 0.61 K/UL — LOW (ref 1–3.3)
LYMPHOCYTES # BLD AUTO: 9.1 % — LOW (ref 13–44)
MCHC RBC-ENTMCNC: 31.7 PG — SIGNIFICANT CHANGE UP (ref 27–34)
MCHC RBC-ENTMCNC: 34.1 GM/DL — SIGNIFICANT CHANGE UP (ref 32–36)
MCV RBC AUTO: 93.2 FL — SIGNIFICANT CHANGE UP (ref 80–100)
MONOCYTES # BLD AUTO: 0.68 K/UL — SIGNIFICANT CHANGE UP (ref 0–0.9)
MONOCYTES NFR BLD AUTO: 10.1 % — SIGNIFICANT CHANGE UP (ref 2–14)
NEUTROPHILS # BLD AUTO: 5.28 K/UL — SIGNIFICANT CHANGE UP (ref 1.8–7.4)
NEUTROPHILS NFR BLD AUTO: 78.3 % — HIGH (ref 43–77)
NITRITE UR-MCNC: POSITIVE
NRBC # BLD: 0 /100 WBCS — SIGNIFICANT CHANGE UP (ref 0–0)
PH UR: 5.5 — SIGNIFICANT CHANGE UP (ref 5–8)
PLATELET # BLD AUTO: 188 K/UL — SIGNIFICANT CHANGE UP (ref 150–400)
POTASSIUM SERPL-MCNC: 3.7 MMOL/L — SIGNIFICANT CHANGE UP (ref 3.5–5.3)
POTASSIUM SERPL-SCNC: 3.7 MMOL/L — SIGNIFICANT CHANGE UP (ref 3.5–5.3)
PROT SERPL-MCNC: 7.2 G/DL — SIGNIFICANT CHANGE UP (ref 6–8.3)
PROT UR-MCNC: 30 MG/DL
RBC # BLD: 3.97 M/UL — LOW (ref 4.2–5.8)
RBC # FLD: 13.2 % — SIGNIFICANT CHANGE UP (ref 10.3–14.5)
RBC CASTS # UR COMP ASSIST: < 5 /HPF — SIGNIFICANT CHANGE UP
SARS-COV-2 RNA SPEC QL NAA+PROBE: SIGNIFICANT CHANGE UP
SODIUM SERPL-SCNC: 138 MMOL/L — SIGNIFICANT CHANGE UP (ref 135–145)
SP GR SPEC: 1.02 — SIGNIFICANT CHANGE UP (ref 1–1.03)
UROBILINOGEN FLD QL: 2 E.U./DL
WBC # BLD: 6.74 K/UL — SIGNIFICANT CHANGE UP (ref 3.8–10.5)
WBC # FLD AUTO: 6.74 K/UL — SIGNIFICANT CHANGE UP (ref 3.8–10.5)
WBC UR QL: < 5 /HPF — SIGNIFICANT CHANGE UP

## 2022-03-06 PROCEDURE — 36415 COLL VENOUS BLD VENIPUNCTURE: CPT

## 2022-03-06 PROCEDURE — U0003: CPT

## 2022-03-06 PROCEDURE — U0005: CPT

## 2022-03-06 PROCEDURE — 80053 COMPREHEN METABOLIC PANEL: CPT

## 2022-03-06 PROCEDURE — 93005 ELECTROCARDIOGRAM TRACING: CPT

## 2022-03-06 PROCEDURE — 96376 TX/PRO/DX INJ SAME DRUG ADON: CPT

## 2022-03-06 PROCEDURE — 76705 ECHO EXAM OF ABDOMEN: CPT | Mod: 26

## 2022-03-06 PROCEDURE — 96374 THER/PROPH/DIAG INJ IV PUSH: CPT

## 2022-03-06 PROCEDURE — 85025 COMPLETE CBC W/AUTO DIFF WBC: CPT

## 2022-03-06 PROCEDURE — 83690 ASSAY OF LIPASE: CPT

## 2022-03-06 PROCEDURE — 83605 ASSAY OF LACTIC ACID: CPT

## 2022-03-06 PROCEDURE — 76705 ECHO EXAM OF ABDOMEN: CPT

## 2022-03-06 PROCEDURE — 99291 CRITICAL CARE FIRST HOUR: CPT | Mod: 25

## 2022-03-06 PROCEDURE — 99291 CRITICAL CARE FIRST HOUR: CPT

## 2022-03-06 PROCEDURE — 93010 ELECTROCARDIOGRAM REPORT: CPT

## 2022-03-06 PROCEDURE — 81001 URINALYSIS AUTO W/SCOPE: CPT

## 2022-03-06 RX ORDER — MORPHINE SULFATE 50 MG/1
4 CAPSULE, EXTENDED RELEASE ORAL ONCE
Refills: 0 | Status: DISCONTINUED | OUTPATIENT
Start: 2022-03-06 | End: 2022-03-06

## 2022-03-06 RX ORDER — OXYCODONE AND ACETAMINOPHEN 5; 325 MG/1; MG/1
1 TABLET ORAL ONCE
Refills: 0 | Status: DISCONTINUED | OUTPATIENT
Start: 2022-03-06 | End: 2022-03-06

## 2022-03-06 RX ORDER — MORPHINE SULFATE 50 MG/1
2 CAPSULE, EXTENDED RELEASE ORAL ONCE
Refills: 0 | Status: DISCONTINUED | OUTPATIENT
Start: 2022-03-06 | End: 2022-03-06

## 2022-03-06 RX ADMIN — MORPHINE SULFATE 2 MILLIGRAM(S): 50 CAPSULE, EXTENDED RELEASE ORAL at 13:32

## 2022-03-06 RX ADMIN — MORPHINE SULFATE 4 MILLIGRAM(S): 50 CAPSULE, EXTENDED RELEASE ORAL at 16:09

## 2022-03-06 RX ADMIN — MORPHINE SULFATE 2 MILLIGRAM(S): 50 CAPSULE, EXTENDED RELEASE ORAL at 16:03

## 2022-03-06 RX ADMIN — MORPHINE SULFATE 2 MILLIGRAM(S): 50 CAPSULE, EXTENDED RELEASE ORAL at 14:05

## 2022-03-06 RX ADMIN — OXYCODONE AND ACETAMINOPHEN 1 TABLET(S): 5; 325 TABLET ORAL at 18:02

## 2022-03-06 NOTE — ED PROVIDER NOTE - NSFOLLOWUPINSTRUCTIONS_ED_ALL_ED_FT
METHAMPHETAMINE USE DISORDER - DrugNote           Methamphetamine Use Disorder    WHAT YOU NEED TO KNOW:    What is methamphetamine (meth) use disorder? Meth use disorder is a medical condition that develops from long-term use of meth. You are not able to stop even though it causes physical or social problems. Meth use disorder is also called meth abuse.    What are the signs and symptoms of meth use disorder? Signs and symptoms include at least 2 of the following in a 12-month period:  •You have a strong urge or craving for meth. This is also called addiction. You are not able to control when you use it or how much you use. You spend large amounts of time trying to get, use, or recover from meth. In between uses, you think about when you will get to use it again.      •You become tolerant to meth. This means the amount you have been using no longer has the effects you want. You need higher amounts to feel the effects.      •You become dependent on meth. Dependence means your body becomes used to meth. You have withdrawal symptoms when you do not use meth for a short amount of time. You have to use it to stop or prevent withdrawal symptoms, such as shaky hands.      •You are not able to stop, or to use less. You start again when you try to quit. You try to use lower amounts or to use it less often, but you are not able.      •You continue to use meth even though it causes problems or is dangerous. For example, try to make the effect stronger by mixing it with alcohol or other drugs. You have problems at school, work, or home. You spend less time doing important or enjoyable activities. You may become violent or impulsive (acting without thinking about the risks). Meth use can also lead to health problems, such as the following:?Heart weakness or damage can develop because meth increases blood pressure and heart rate.      ?Skin problems may develop if you start picking at your skin or do not care for needle marks. Skin picking causes sores to grow, and the sores can get infected. Meth injection causes needle marks on your skin. Needle marks can also get infected.      ?Dry mouth can develop and make you chew, clench, or grind your teeth more than normal. This causes your teeth to wear down. Your teeth may turn dark or black. They may break, crumble, or fall apart.        How is meth use disorder diagnosed and treated? Blood or urine tests may be used to check the level of meth in your system. The tests can also check for physical problems meth can cause. Healthcare providers can help you make decisions about treatment programs. Treatment may be offered in a hospital, outpatient facility, or drug rehabilitation center. The goal is to help you decrease or stop taking meth.  •A monitor will be put on you to check your heart.      •Medicines may be given to absorb the drug if you swallowed meth, or to lower your blood pressure. Medicines may be given to help you stay calm, manage depression, or decrease false thoughts.      •A detox program includes medicine and treatment to reduce withdrawal symptoms and anxiety when you stop taking meth. You will be in the hospital with close monitoring and care.      •Cognitive behavioral therapy (CBT) can help you manage depression and anxiety caused by meth use disorder. CBT can be done with you and a talk therapist or in a group with others.      •Motivational enhancement therapy can help you set and reach healthy, positive goals.      •Twelve-step facilitation (TSF) is a short, structured approach to reach early recovery. It is done one-to-one with a therapist in 12 to 15 sessions.      What can I do to lower the risk for certain problems meth use can cause?   •Do not mix meth with medicines, drugs, or alcohol. The combination can be life-threatening.      •Learn about the signs of an overdose so you know how to respond. Tell others about these signs so they will know what to do if needed. Signs include a fast heartbeat, chest pain, a severed headache, hallucinations, heavy sweating, and agitation. Get immediate help or call your local emergency number (911 in the [place]) for signs of a meth overdose.      •Do not share used needles with anyone. Your risk for HIV, hepatitis, and other diseases is higher if you share used needles. Ask about needle exchange programs. You may be able to return used needles and syringes and replace them with clean items.      •Work with healthcare providers if you are pregnant or want to breastfeed. Meth is not safe for you or your baby. Meth can prevent your baby from growing in your womb as he or she should. He or she may be born too early or die before birth. Your baby may have problems with his or her heart, brain, or body development. Do not breastfeed or give your baby breast milk if you take meth. The meth will go to your baby through the breast milk.      Where can I find support and more information?   •Substance Abuse and Mental Health Services Administration  [address]  [place] [address]  Web Address: http://www.[facility].gov      •National [place] on Drug Abuse  [address]  [place], [id number]  Phone: 1-142.227.4775  Web Address: www.[place].nih.gov        Call your local emergency number (911 in the ) if:   •You have chest pain, and your heartbeat or breathing is faster than usual.      •You have a seizure.      •You want to hurt yourself or someone else.      When should I seek immediate care?   •You are so nervous that you cannot do your daily activities.      •You feel sick or vomit, or have headaches or trouble breathing while being around or cooking meth. You may also feel dizzy.      •Children or others who have been near meth look or act ill, or will not wake up.      When should I call my doctor?   •You have a fever.      •You know or think you may be pregnant.      •You have questions or concerns about your condition or care.      CARE AGREEMENT:    You have the right to help plan your care. Learn about your health condition and how it may be treated. Discuss treatment options with your healthcare providers to decide what care you want to receive. You always have the right to refuse treatment.       Patient Name: KATHRYN LLOYD  Caregiver: Lanny Carpio  Illegal Drug Use Information, Adult    Illegal drugs are chemicals and substances that are illegal to use, sell, or have (possess). Health care providers and pharmacies do not use or carry these types of drugs to treat medical problems because they can cause serious side effects and can lead to death. Examples of illegal drugs include:    Cocaine or crack.  Meth (methamphetamine) or crystal meth.  "Bath salts" (synthetic cathinones).  Heroin.  LSD, PCP, or acid.  Ecstasy.    What is drug dependence?  Using illegal drugs often leads to dependence or addiction. When you use certain drugs over a long period of time, your brain chemistry changes so that you can no longer function normally without that drug. This is called drug dependence. Drug dependence can cause you to:    Have unpleasant feelings and physical problems when you stop using the drug (withdrawal).  Be unable to perform at work or at home, or do the activities you used to do, without using the drug.    Some drugs make people feel so good that they want to use the drug again and again. This is called drug addiction. People who are addicted spend a lot of time seeking out the drug so that they can get the feeling they want from it. Addiction and dependence can be very hard to overcome.    How can illegal drug use and dependence affect me?  Using an illegal drug only once can have a major impact on your life. It is possible to die from side effects after using a drug just once. If you use an illegal drug repeatedly, you may need to take larger and larger doses of the drug to experience the feelings you want. Drug dependency and addiction may lead to:    Being unable to care for yourself and others.  Withdrawal, if you stop using the drug.  Negative effects on your relationships and work performance. It causes others not to trust you. If you have children, you could lose custody of them.  Behaving in ways that do not match your values.  Lying and crime, such as stealing.  correction or long term. This can affect your ability to find a good job or continue your education.  Health problems such as tooth loss, skin problems, heart and lung disease, and stomach problems.  If you are a woman, you may have problems with pregnancy, including:    Losing the pregnancy early (miscarriage), early delivery (premature birth), or delivering a lifeless infant (stillbirth).  Slow or abnormal growth (birth defects) of your unborn child.  Giving birth to a  who is addicted to illegal drugs.  A drug overdose. This is a dangerous situation that requires hospitalization and often leads to death.    What are the benefits of avoiding illegal drug use?  Avoiding illegal drug use can:    Keep your mind and body healthy. This can help improve work performance and participation in activities.  Keep you from developing drug dependency or addiction. This can help you avoid negative side effects such as withdrawal and overdose.  Help you have healthy relationships with your friends and family.  Help you have more stable finances. Instead of using your money for drugs, you can:    Spend it on things you would like to have.  Save it for future use, such as for senior living or for big purchases like a car or a house.  Help you avoid a permanent criminal record, snf time, or long term time. Having a clean record allows you to have more job and educational opportunities in the future.    What actions can be taken?     To avoid using illegal drugs:    Find healthy ways to cope with stress, such as exercise, meditation, or spending time with family and friends. Talk with your health care provider about how you feel and how to cope with stress.  Spend time with people who do not use illegal drugs, or make new friends who do not use drugs.  Do something else instead of using drugs. You can exercise, take up a hobby, or participate in activities that you can do with others.  Do not be afraid to say no if someone offers you an illegal drug. Speak up about why you do not want to use drugs. You can be a positive role model for others.  Work with a health care provider or counselor to create a program for yourself to help you deal with various aspects of your addiction.    Where to find more information  You can find more information about illegal drug use, dependence, and addiction from:    Your health care provider or mental health counselor.  Narcotics Anonymous: www.na.org  Substance Abuse and Mental Health Services Administration:     Treatment finder: https://www.sama.gov/find-help  National helpline: 6-565-495-HELP (9338)    Contact a health care provider if:  You use illegal drugs.  You have missed family activities or work to use illegal drugs.  You have lied or stolen to get illegal drugs.  You lose interest in things you used to enjoy, like hobbies or family activities.  Your eating or sleeping habits change as a result of drug use.  You use medicine to get the same effects as a drug. This is illegal use and can become addictive as well.  You stopped illegal drug use previously and you start actively using it again (relapse).  You want help to change your addictive behavior.    Get help right away if:  You have thoughts about hurting yourself or others.    If you ever feel like you may hurt yourself or others, or have thoughts about taking your own life, get help right away. You can go to your nearest emergency department or call:    Your local emergency services (911 in the [place]   A suicide crisis helpline, such as the National Suicide Prevention Lifeline at 1-690.828.7864. This is open 24 hours a day.    Summary  Illegal drugs are chemicals and substances that are illegal to use, sell, or possess.  Using illegal drugs often leads to dependence or addiction. This means that you need the drug to feel normal.  If you use illegal drugs, look for resources to help you quit and find healthy ways to cope with stress.    ADDITIONAL NOTES AND INSTRUCTIONS    Please follow up with your Primary MD in 24-48 hr.  Seek immediate medical care for any new/worsening signs or symptoms. BILIARY COLIC - AfterCare(R) Instructions(ER/ED)           Biliary Colic    WHAT YOU NEED TO KNOW:    Biliary colic is severe pain in your upper abdomen caused by a gallbladder problem. Your gallbladder stores bile, which helps break down the fats that you eat.     Gallbladder         DISCHARGE INSTRUCTIONS:    Medicines:   •Medicines can help decrease pain and muscle spasms. You may also need medicine to calm your stomach and stop vomiting.      •Take your medicine as directed. Contact your healthcare provider if you think your medicine is not helping or you have side effects. Tell him if you are allergic to any medicine. Keep a list of the medicines, vitamins, and herbs you take. Include the amounts, and when and why you take them. Bring the list or the pill bottles to follow-up visits. Carry your medicine list with you in case of an emergency.      Avoid alcohol: Alcohol can damage your gallbladder and make your symptoms worse.    Maintain a healthy weight: Ask your healthcare provider how much you should weigh. Ask him to help you create a weight loss plan if you are overweight.     Eat a variety of healthy foods: Healthy foods include fruits, vegetables, whole-grain breads, low-fat dairy products, beans, lean meats, and fish. Foods that are high in fiber and low in fat and cholesterol may decrease your symptoms. Ask if you need to be on a special diet.    Exercise: Ask your healthcare provider about the best exercise plan for you. Exercise may help improve your symptoms.    Follow up with your healthcare provider as directed: Bring a list of any questions you have so you remember to ask them during your visits.     Contact your healthcare provider if:   •You have a fever.      •Your pain gets worse, even after you take medicine.      •You have nausea or are vomiting.      •Your skin or eyes are yellow.      •You have questions or concerns about your condition or care.      Return to the emergency department if:   •You have severe pain.      •You feel like you are going to faint.      •You are short of breath.

## 2022-03-06 NOTE — ED PROVIDER NOTE - OBJECTIVE STATEMENT
pmh of pacemaker, knee surgery, hld here for RUQ pain since yesterday. c/o nausea. denies vomitting, fever, chills, sweats, dairrhea

## 2022-03-06 NOTE — CONSULT NOTE ADULT - SUBJECTIVE AND OBJECTIVE BOX
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     In the ED vitals were all wnl; Tmax 98.3, HR 87, /76, RR 18, SpO2 97%. Initial labs notable for Hgb 12.6, along with UA notabel for bilirubin and 2 urobilinogen. Remaining labs wnl including WBC 6.7, lactate 0.7, Tbili 0.8, AST/ALT 26/29. Abd US notable for sludge and stones, thickened GB wall, no PCCF, and CBD measuring 0.5.     PSH:  Allergies:  Medications: Eliquis   FH:      Vital Signs Last 24 Hrs  T(C): 36.8 (06 Mar 2022 12:47), Max: 36.8 (06 Mar 2022 12:47)  T(F): 98.3 (06 Mar 2022 12:47), Max: 98.3 (06 Mar 2022 12:47)  HR: 87 (06 Mar 2022 12:47) (87 - 87)  BP: 147/73 (06 Mar 2022 16:04) (140/76 - 147/73)  BP(mean): --  RR: 18 (06 Mar 2022 16:04) (18 - 18)  SpO2: 98% (06 Mar 2022 16:04) (97% - 98%)  I&O's Detail    PHYSICAL EXAM  General: NAD, resting comfortably in bed  C/V: NSR  Pulm: Nonlabored breathing, no respiratory distress  Abd: soft, NT/ND.  Extrem: WWP, no edema, SCDs in place        LABS:                        12.6   6.74  )-----------( 188      ( 06 Mar 2022 13:33 )             37.0     03-06    138  |  99  |  12  ----------------------------<  112<H>  3.7   |  27  |  0.67    Ca    9.4      06 Mar 2022 13:33    TPro  7.2  /  Alb  3.8  /  TBili  0.8  /  DBili  x   /  AST  26  /  ALT  29  /  AlkPhos  110  03-06      Urinalysis Basic - ( 06 Mar 2022 13:33 )    Color: Yellow / Appearance: Hazy / S.025 / pH: x  Gluc: x / Ketone: Trace mg/dL  / Bili: Small / Urobili: 2.0 E.U./dL   Blood: x / Protein: 30 mg/dL / Nitrite: POSITIVE   Leuk Esterase: NEGATIVE / RBC: < 5 /HPF / WBC < 5 /HPF   Sq Epi: x / Non Sq Epi: 0-5 /HPF / Bacteria: Many /HPF        RADIOLOGY & ADDITIONAL STUDIES:  ACC: [id number] EXAM:  US ABDOMEN LIMITED                          PROCEDURE DATE:  2022          INTERPRETATION:  CLINICAL INFORMATION: Right upper quadrant abdominal   pain since yesterday    COMPARISON: CT abdomen and pelvis 2016    TECHNIQUE: Sonography of the abdomen.    FINDINGS:    Liver: Cirrhosis.  Bile ducts: Normal caliber.  Common bile duct measures 0.5 cm.  Gallbladder: There are mobile gallstones and sludge within the   gallbladder causing a 1.3 cm mobile stone at the neck. Positive   sonographic Turner sign. The gallbladder wall appears thickened.  Pancreas: Visualized portions are within normal limits.  Right kidney: 11.3 cm. No hydronephrosis.  Ascites: None.  Aorta and IVC: Visualized portions are within normal limits.      IMPRESSION:    Cholelithiasis with positive sonographic Turner sign may represent   cholecystitis in the appropriate clinical context.    Cirrhosis.    --- End of Report ---          OSVALDO HA MD; Resident Radiologist  This document hasbeen electronically signed.  GENESIS GALLAGHER MD; Attending Radiologist  This document has been electronically signed. Mar  6 2022  3:09PM     HPI:  71M with PMH BPH, arthritis, hepatitis C (s/p treatment 8 years ago), CHB with junctional escape s/p PPM () and psh of R TKR () presenting to [facility] with 1 day h/o RUQ with associated nausea. Reports waking up to severe RUQ pain yesterday morning which was constant and radiation to the epigastrium. Reports symptoms associated with reflux that resolved. D/t persistence of pain sought care in the ED. Pain was achy and improved with morphine. Also reports associated dark colored urine, denies dysuria or hematuria. Last BM was this morning and was normal in color and caliber; dark brown, nonblack nonbloody. Denies f/c, CP, SOB, dyspnea. Reports weakness in R knee following R TKR 9 months ago which he had aspirated last week. Underwent EGD/Cscope <10 years ago which notable for gastritis, and colonic polyps. Following with a gastroenterologist at [facility] and is scheduled to undergo surveillance MRI liver for h/o HCV.     In the ED vitals were all wnl; Tmax 98.3, HR 87, /76, RR 18, SpO2 97%. Initial labs notable for Hgb 12.6, along with UA notabel for bilirubin and 2 urobilinogen. Remaining labs wnl including WBC 6.7, lactate 0.7, Tbili 0.8, AST/ALT 26/29. Abd US notable for sludge and stones, thickened GB wall, no PCCF, and CBD measuring 0.5.     PMH: BPH, arthritis, hepatitis C (s/p treatment 8 years ago), CHB with junctional escape s/p PPM ()   PSH: R TKR  Allergies: NKDA  Medications: Eliquis 10 BID, Rosuvastatin 10mg  FH: No h/o GB stones or GB CA      Vital Signs Last 24 Hrs  T(C): 36.8 (06 Mar 2022 12:47), Max: 36.8 (06 Mar 2022 12:47)  T(F): 98.3 (06 Mar 2022 12:47), Max: 98.3 (06 Mar 2022 12:47)  HR: 87 (06 Mar 2022 12:47) (87 - 87)  BP: 147/73 (06 Mar 2022 16:04) (140/76 - 147/73)  BP(mean): --  RR: 18 (06 Mar 2022 16:04) (18 - 18)  SpO2: 98% (06 Mar 2022 16:04) (97% - 98%)  I&O's Detail    PHYSICAL EXAM  General: NAD, resting comfortably in bed  C/V: NSR  Pulm: Nonlabored breathing, no respiratory distress  Abd: soft, nondistended, mildly TTP in RUQ. Turner's negative. No rebound or guarding. B/l inguinal hernia.   Extrem: WWP, right knee   Skin: warm, no rashes  Psych: calm, appropriate        LABS:                        12.6   6.74  )-----------( 188      ( 06 Mar 2022 13:33 )             37.0     03-06    138  |  99  |  12  ----------------------------<  112<H>  3.7   |  27  |  0.67    Ca    9.4      06 Mar 2022 13:33    TPro  7.2  /  Alb  3.8  /  TBili  0.8  /  DBili  x   /  AST  26  /  ALT  29  /  AlkPhos  110  03-06      Urinalysis Basic - ( 06 Mar 2022 13:33 )    Color: Yellow / Appearance: Hazy / S.025 / pH: x  Gluc: x / Ketone: Trace mg/dL  / Bili: Small / Urobili: 2.0 E.U./dL   Blood: x / Protein: 30 mg/dL / Nitrite: POSITIVE   Leuk Esterase: NEGATIVE / RBC: < 5 /HPF / WBC < 5 /HPF   Sq Epi: x / Non Sq Epi: 0-5 /HPF / Bacteria: Many /HPF        RADIOLOGY & ADDITIONAL STUDIES:  ACC: [id number] EXAM:  US ABDOMEN LIMITED                          PROCEDURE DATE:  2022          INTERPRETATION:  CLINICAL INFORMATION: Right upper quadrant abdominal   pain since yesterday    COMPARISON: CT abdomen and pelvis 2016    TECHNIQUE: Sonography of the abdomen.    FINDINGS:    Liver: Cirrhosis.  Bile ducts: Normal caliber.  Common bile duct measures 0.5 cm.  Gallbladder: There are mobile gallstones and sludge within the   gallbladder causing a 1.3 cm mobile stone at the neck. Positive   sonographic Turner sign. The gallbladder wall appears thickened.  Pancreas: Visualized portions are within normal limits.  Right kidney: 11.3 cm. No hydronephrosis.  Ascites: None.  Aorta and IVC: Visualized portions are within normal limits.      IMPRESSION:    Cholelithiasis with positive sonographic Turner sign may represent   cholecystitis in the appropriate clinical context.    Cirrhosis.    --- End of Report ---          OSVALDO HA MD; Resident Radiologist  This document hasbeen electronically signed.  GENESIS GALLAGHER MD; Attending Radiologist  This document has been electronically signed. Mar  6 2022  3:09PM

## 2022-03-06 NOTE — CONSULT NOTE ADULT - ASSESSMENT
71M with PMH BPH, arthritis, hepatitis C (s/p treatment 8 years ago), CHB with junctional escape s/p PPM (2017) and psh of R TKR (2021) presenting to [facility] with 1 day h/o RUQ with associated nausea. Vitally wnl, afebrile without leukocytosis or LFTs. Clinical and radiographic findings c/w with symptomatic cholelithiasis vs PUD vs acute calculous cholecystitis    No acute surgical intervention  Patient has a strong preference for outpatient elective management for symptomatic cholelithiasis.  Operative intervention was offered along with preoperative cardiac risk assessment prior to going to the operating room.   Recommend outpatient f/u with established GI  Recommend analgesics PRN   Recommend outpatient f/u with [facility] clinic this week  Should the patient have persistent, increased abdominal pain, inability to tolerated PO, f/c, CP, SOB it is strongly recommended that they return to [facility] ED for further management   Ok to discharge from surgical perspective   Plan discussed with patient, ED attending, and chief surgery resident and all were in agreement.

## 2022-03-06 NOTE — ED PROVIDER NOTE - CARE PROVIDER_API CALL
Giovanny Pizano (MD)  Surgery  [address] [address]  Phone: [phone]  Fax: [phone]  Follow Up Time: Urgent

## 2022-03-06 NOTE — ED PROVIDER NOTE - CRITICAL CARE ATTENDING CONTRIBUTION TO CARE
pt here for meth od  poison control phoned ad as well  given ivf and iv ativan  pt vs stablle  pt feeling better

## 2022-03-06 NOTE — ED PROVIDER NOTE - PATIENT PORTAL LINK FT
You can access the FollowMyHealth Patient Portal offered by [facility] by registering at the following website: [email or web address]. By joining [employer]’s FollowMyHealth portal, you will also be able to view your health information using other applications (apps) compatible with our system. You can access the FollowMyHealth Patient Portal offered by [facility] by registering at the following website: [email or web address]. By joining [employer]’s FollowMyHealth portal, you will also be able to view your health information using other applications (apps) compatible with our system.

## 2022-03-06 NOTE — ED PROVIDER NOTE - CLINICAL SUMMARY MEDICAL DECISION MAKING FREE TEXT BOX
pt here for meth OD. c/o palpitations HR in 120s, given IV ativan, HR trending down, pt states feeling better. started on ivf. pt cont to have elevated HR to 100s, given another dose of 1mg of ativan  pt HR in 80s, bp wnl,    pt alert, oriented, feeling better  strongly advised Drug detox programs  advised harm of meth OD and use   pt advised if new or worsening sxs to reutrn to ed immediately  poison control also phoned and consulted, agree with plan. pt here for RUQ pain sent in by urgent care  us shows gallstone and pos murphys sign on exam  surgery consulted  after extensive discussion with pt aand surgery team, pt wants to go home and fu with GI doctor and surgery out pt  pt advised if new or worsening sxs to return to ed  pt received 2 4mg doses of morphine for pain management  pt verbalizes understanding to follow up with GI/surgery in next 1-2 days

## 2022-03-06 NOTE — ED ADULT NURSE NOTE - NSIMPLEMENTINTERV_GEN_ALL_ED
Implemented All Universal Safety Interventions:  [place] to call system. Call bell, personal items and telephone within reach. Instruct patient to call for assistance. Room bathroom lighting operational. Non-slip footwear when patient is off stretcher. Physically safe environment: no spills, clutter or unnecessary equipment. Stretcher in lowest position, wheels locked, appropriate side rails in place.

## 2022-03-06 NOTE — ED ADULT TRIAGE NOTE - CHIEF COMPLAINT QUOTE
Pt presents to ED c/o RUQ pain x 3 days. Denies nausea, vomiting, diarrhea, fevers. Sent in by urgent care for rule out gallstones.

## 2022-03-06 NOTE — ED ADULT NURSE NOTE - OBJECTIVE STATEMENT
70 y/o M, pmh of pacemaker placed, left TKA, HLD, p/w RUQ abd pain for past few days. Patient denies fevers, chills, nausea, vomiting, diarrhea, or urinary s/s. Refereed from UC for further management. Patient has noticied urine is darker than usual. Reports no change in hydration.

## 2022-03-10 ENCOUNTER — APPOINTMENT (OUTPATIENT)
Dept: BARIATRICS | Facility: CLINIC | Age: 72
End: 2022-03-10
Payer: MEDICARE

## 2022-03-10 ENCOUNTER — TRANSCRIPTION ENCOUNTER (OUTPATIENT)
Age: 72
End: 2022-03-10

## 2022-03-10 VITALS
BODY MASS INDEX: 21.44 KG/M2 | WEIGHT: 148.06 LBS | DIASTOLIC BLOOD PRESSURE: 83 MMHG | OXYGEN SATURATION: 98 % | HEART RATE: 83 BPM | HEIGHT: 69.5 IN | SYSTOLIC BLOOD PRESSURE: 161 MMHG | TEMPERATURE: 97.6 F

## 2022-03-10 PROCEDURE — 99204 OFFICE O/P NEW MOD 45 MIN: CPT

## 2022-03-10 RX ORDER — OXYCODONE AND ACETAMINOPHEN 5; 325 MG/1; MG/1
5-325 TABLET ORAL
Qty: 10 | Refills: 0 | Status: ACTIVE | COMMUNITY
Start: 2022-03-10 | End: 1900-01-01

## 2022-03-14 ENCOUNTER — OUTPATIENT (OUTPATIENT)
Dept: OUTPATIENT SERVICES | Facility: HOSPITAL | Age: 72
LOS: 1 days | End: 2022-03-14
Payer: MEDICARE

## 2022-03-14 DIAGNOSIS — Z98.89 OTHER SPECIFIED POSTPROCEDURAL STATES: Chronic | ICD-10-CM

## 2022-03-14 DIAGNOSIS — Z01.818 ENCOUNTER FOR OTHER PREPROCEDURAL EXAMINATION: ICD-10-CM

## 2022-03-14 LAB
A1C WITH ESTIMATED AVERAGE GLUCOSE RESULT: 5.2 % — SIGNIFICANT CHANGE UP (ref 4–5.6)
ALBUMIN SERPL ELPH-MCNC: 4.2 G/DL — SIGNIFICANT CHANGE UP (ref 3.3–5)
ALP SERPL-CCNC: 84 U/L — SIGNIFICANT CHANGE UP (ref 40–120)
ALT FLD-CCNC: 20 U/L — SIGNIFICANT CHANGE UP (ref 10–45)
ANION GAP SERPL CALC-SCNC: 8 MMOL/L — SIGNIFICANT CHANGE UP (ref 5–17)
APPEARANCE UR: CLEAR — SIGNIFICANT CHANGE UP
APTT BLD: 35.2 SEC — SIGNIFICANT CHANGE UP (ref 27.5–35.5)
AST SERPL-CCNC: 22 U/L — SIGNIFICANT CHANGE UP (ref 10–40)
BASOPHILS # BLD AUTO: 0.03 K/UL — SIGNIFICANT CHANGE UP (ref 0–0.2)
BASOPHILS NFR BLD AUTO: 0.7 % — SIGNIFICANT CHANGE UP (ref 0–2)
BILIRUB SERPL-MCNC: 0.3 MG/DL — SIGNIFICANT CHANGE UP (ref 0.2–1.2)
BILIRUB UR-MCNC: ABNORMAL
BUN SERPL-MCNC: 15 MG/DL — SIGNIFICANT CHANGE UP (ref 7–23)
CALCIUM SERPL-MCNC: 9.7 MG/DL — SIGNIFICANT CHANGE UP (ref 8.4–10.5)
CHLORIDE SERPL-SCNC: 104 MMOL/L — SIGNIFICANT CHANGE UP (ref 96–108)
CO2 SERPL-SCNC: 30 MMOL/L — SIGNIFICANT CHANGE UP (ref 22–31)
COLOR SPEC: YELLOW — SIGNIFICANT CHANGE UP
CREAT SERPL-MCNC: 0.72 MG/DL — SIGNIFICANT CHANGE UP (ref 0.5–1.3)
DIFF PNL FLD: NEGATIVE — SIGNIFICANT CHANGE UP
EGFR: 96 ML/MIN/1.73M2 — SIGNIFICANT CHANGE UP
EOSINOPHIL # BLD AUTO: 0.09 K/UL — SIGNIFICANT CHANGE UP (ref 0–0.5)
EOSINOPHIL NFR BLD AUTO: 2.2 % — SIGNIFICANT CHANGE UP (ref 0–6)
ESTIMATED AVERAGE GLUCOSE: 103 MG/DL — SIGNIFICANT CHANGE UP (ref 68–114)
GLUCOSE SERPL-MCNC: 111 MG/DL — HIGH (ref 70–99)
GLUCOSE UR QL: NEGATIVE — SIGNIFICANT CHANGE UP
HCT VFR BLD CALC: 42.3 % — SIGNIFICANT CHANGE UP (ref 39–50)
HGB BLD-MCNC: 13.3 G/DL — SIGNIFICANT CHANGE UP (ref 13–17)
IMM GRANULOCYTES NFR BLD AUTO: 0.5 % — SIGNIFICANT CHANGE UP (ref 0–1.5)
INR BLD: 1.61 — HIGH (ref 0.88–1.16)
KETONES UR-MCNC: ABNORMAL MG/DL
LEUKOCYTE ESTERASE UR-ACNC: NEGATIVE — SIGNIFICANT CHANGE UP
LYMPHOCYTES # BLD AUTO: 0.74 K/UL — LOW (ref 1–3.3)
LYMPHOCYTES # BLD AUTO: 18.3 % — SIGNIFICANT CHANGE UP (ref 13–44)
MCHC RBC-ENTMCNC: 30.4 PG — SIGNIFICANT CHANGE UP (ref 27–34)
MCHC RBC-ENTMCNC: 31.4 GM/DL — LOW (ref 32–36)
MCV RBC AUTO: 96.8 FL — SIGNIFICANT CHANGE UP (ref 80–100)
MONOCYTES # BLD AUTO: 0.45 K/UL — SIGNIFICANT CHANGE UP (ref 0–0.9)
MONOCYTES NFR BLD AUTO: 11.1 % — SIGNIFICANT CHANGE UP (ref 2–14)
NEUTROPHILS # BLD AUTO: 2.71 K/UL — SIGNIFICANT CHANGE UP (ref 1.8–7.4)
NEUTROPHILS NFR BLD AUTO: 67.2 % — SIGNIFICANT CHANGE UP (ref 43–77)
NITRITE UR-MCNC: NEGATIVE — SIGNIFICANT CHANGE UP
NRBC # BLD: 0 /100 WBCS — SIGNIFICANT CHANGE UP (ref 0–0)
PH UR: 5 — SIGNIFICANT CHANGE UP (ref 5–8)
PLATELET # BLD AUTO: 300 K/UL — SIGNIFICANT CHANGE UP (ref 150–400)
POTASSIUM SERPL-MCNC: 4.8 MMOL/L — SIGNIFICANT CHANGE UP (ref 3.5–5.3)
POTASSIUM SERPL-SCNC: 4.8 MMOL/L — SIGNIFICANT CHANGE UP (ref 3.5–5.3)
PROT SERPL-MCNC: 7.9 G/DL — SIGNIFICANT CHANGE UP (ref 6–8.3)
PROT UR-MCNC: NEGATIVE MG/DL — SIGNIFICANT CHANGE UP
PROTHROM AB SERPL-ACNC: 19.2 SEC — HIGH (ref 10.5–13.4)
RBC # BLD: 4.37 M/UL — SIGNIFICANT CHANGE UP (ref 4.2–5.8)
RBC # FLD: 13.5 % — SIGNIFICANT CHANGE UP (ref 10.3–14.5)
SODIUM SERPL-SCNC: 142 MMOL/L — SIGNIFICANT CHANGE UP (ref 135–145)
SP GR SPEC: >=1.03 — SIGNIFICANT CHANGE UP (ref 1–1.03)
UROBILINOGEN FLD QL: 0.2 E.U./DL — SIGNIFICANT CHANGE UP
WBC # BLD: 4.04 K/UL — SIGNIFICANT CHANGE UP (ref 3.8–10.5)
WBC # FLD AUTO: 4.04 K/UL — SIGNIFICANT CHANGE UP (ref 3.8–10.5)

## 2022-03-14 PROCEDURE — 83036 HEMOGLOBIN GLYCOSYLATED A1C: CPT

## 2022-03-14 PROCEDURE — 84443 ASSAY THYROID STIM HORMONE: CPT

## 2022-03-14 PROCEDURE — 93005 ELECTROCARDIOGRAM TRACING: CPT

## 2022-03-14 PROCEDURE — 85610 PROTHROMBIN TIME: CPT

## 2022-03-14 PROCEDURE — 85025 COMPLETE CBC W/AUTO DIFF WBC: CPT

## 2022-03-14 PROCEDURE — 80053 COMPREHEN METABOLIC PANEL: CPT

## 2022-03-14 PROCEDURE — 99214 OFFICE O/P EST MOD 30 MIN: CPT

## 2022-03-14 PROCEDURE — 87086 URINE CULTURE/COLONY COUNT: CPT

## 2022-03-14 PROCEDURE — 81003 URINALYSIS AUTO W/O SCOPE: CPT

## 2022-03-14 PROCEDURE — 85730 THROMBOPLASTIN TIME PARTIAL: CPT

## 2022-03-14 PROCEDURE — 93010 ELECTROCARDIOGRAM REPORT: CPT

## 2022-03-15 LAB
CULTURE RESULTS: SIGNIFICANT CHANGE UP
SPECIMEN SOURCE: SIGNIFICANT CHANGE UP
T4 FREE+ TSH PNL SERPL: 1.33 UIU/ML — SIGNIFICANT CHANGE UP (ref 0.27–4.2)

## 2022-03-22 ENCOUNTER — APPOINTMENT (OUTPATIENT)
Dept: HEART AND VASCULAR | Facility: CLINIC | Age: 72
End: 2022-03-22
Payer: MEDICARE

## 2022-03-22 VITALS
OXYGEN SATURATION: 98 % | WEIGHT: 147 LBS | SYSTOLIC BLOOD PRESSURE: 137 MMHG | BODY MASS INDEX: 21.28 KG/M2 | DIASTOLIC BLOOD PRESSURE: 77 MMHG | HEART RATE: 89 BPM | HEIGHT: 69.5 IN

## 2022-03-22 PROCEDURE — 99203 OFFICE O/P NEW LOW 30 MIN: CPT

## 2022-03-22 NOTE — PHYSICAL EXAM
[Well Developed] : well developed [Well Nourished] : well nourished [No Acute Distress] : no acute distress [Normal Venous Pressure] : normal venous pressure [Normal S1, S2] : normal S1, S2 [Clear Lung Fields] : clear lung fields [Good Air Entry] : good air entry [Soft] : abdomen soft [Normal Bowel Sounds] : normal bowel sounds [Tenderness] : tenderness [No Edema] : no edema [Moves all extremities] : moves all extremities

## 2022-03-22 NOTE — REVIEW OF SYSTEMS
[Abdominal Pain] : abdominal pain [Change in Appetite] : change in appetite [Negative] : Cardiovascular

## 2022-03-23 ENCOUNTER — NON-APPOINTMENT (OUTPATIENT)
Age: 72
End: 2022-03-23

## 2022-03-23 ENCOUNTER — LABORATORY RESULT (OUTPATIENT)
Age: 72
End: 2022-03-23

## 2022-03-23 NOTE — REASON FOR VISIT
[FreeTextEntry1] : Patient is a 70 yo Male  referred by surgery for pre-operative clearance for Lap cholecystectomy\par \par

## 2022-03-23 NOTE — DISCUSSION/SUMMARY
[FreeTextEntry1] : 72 yo Male with Pmhx of CHB s/p PPM (90% Paced- DDD), pAFib on Eliquis, Mild Slpeep apnea, Hep C/Cirrhosis s/p Therapy, well followed by GI with Biannual MRI, Severe OA with recent right knee replacement at [facility] (10 months prior), referred by surgery for pre-operative clearance for Lap cholecystectomy for Gallbladder Gallstones with evidence of CBD dilation\par \par 1) Pre-Operative Clearance.\par - Patient's performance status is limited by his severe OA and right knee replacement but easily meets MET>4\par -No known comorbid CAD/PAD, lung or Renal disease. His sleep apnea is mild and patient does not use CPAP. He has known cirrhosis that is well compensated\par -EKG today showing NSR, Vpaced. Last Echo from 2017 with normal BIV function without valvular disease\par -pAfib noted on previous PPM interrogation. Patient knows to hold Eliquis 5 days prior to scheduled intervention on Saturday\par -Patient is at low-intermediate risk for an intermediate risk procedure. No further cardiac evaluation warranted prior to Lap/Lesli\par -In regards to his PPM. Patient currently PPM dependant (paced 90% of the time. Recommend calling EP on day of surgery. Alternative would be to place Magnet on during surgery (Would automatically put the PPM on DOO)\par -Of note patient with previous distant history of Opioid use with high tolerance. Please make anesthesia aware\par

## 2022-03-23 NOTE — HISTORY OF PRESENT ILLNESS
[FreeTextEntry1] : 70 yo Male with Pmhx of CHB s/p PPM (90% Paced- DDD), pAFib on Eliquis, Mild Slpeep apnea, Hep C/Cirrhosis s/p Therapy, well followed by GI with Biannual MRI, Severe OA with recent right knee replacement at [facility] (10 months prior), referred by surgery for pre-operative clearance for Lap cholecystectomy for Gallbladder Gallstones with evidence of CBD dilation\par \par Patient was seen in the ER 2 weeks prior for severe abdominal pain after which he was diagnosed. Since he has been feeling poorly with intermittent abdominal pain. \par \par Patient's performance status is limited by his severe OA and right knee replacement with chronic swelling. he is able to walk >10 city blocks without exertional shortness of breath or angina. ROS is negative for chest pain, dizziness, orthopnea, platypnea, LE edema. He does not have renal failure, O2 needs, prior Hx of CAD/PAD\par

## 2022-03-24 ENCOUNTER — APPOINTMENT (OUTPATIENT)
Dept: HEART AND VASCULAR | Facility: CLINIC | Age: 72
End: 2022-03-24
Payer: MEDICARE

## 2022-03-24 VITALS
HEIGHT: 69.5 IN | TEMPERATURE: 95.6 F | DIASTOLIC BLOOD PRESSURE: 73 MMHG | BODY MASS INDEX: 21.28 KG/M2 | SYSTOLIC BLOOD PRESSURE: 141 MMHG | HEART RATE: 89 BPM | WEIGHT: 147 LBS

## 2022-03-24 PROCEDURE — 93280 PM DEVICE PROGR EVAL DUAL: CPT

## 2022-03-24 PROCEDURE — 99213 OFFICE O/P EST LOW 20 MIN: CPT | Mod: 25

## 2022-03-25 ENCOUNTER — TRANSCRIPTION ENCOUNTER (OUTPATIENT)
Age: 72
End: 2022-03-25

## 2022-03-25 VITALS
RESPIRATION RATE: 16 BRPM | DIASTOLIC BLOOD PRESSURE: 79 MMHG | WEIGHT: 145.95 LBS | OXYGEN SATURATION: 98 % | HEART RATE: 75 BPM | HEIGHT: 69 IN | SYSTOLIC BLOOD PRESSURE: 138 MMHG | TEMPERATURE: 98 F

## 2022-03-25 NOTE — ASU PREOP CHECKLIST - 3.
Pacemaker card copied and filed Pacemaker card copied and filed which reported to anesthesia. EP study room doesn't  phone, Anesthesia is aware of it

## 2022-03-25 NOTE — ASU PATIENT PROFILE, ADULT - NSICDXPASTSURGICALHX_GEN_ALL_CORE_FT
PAST SURGICAL HISTORY:  H/O hernia repair     History of surgery rt shoulder    History of surgery PPM    History of surgery tracheostomy    S/P right knee surgery

## 2022-03-25 NOTE — ASU PATIENT PROFILE, ADULT - FALL HARM RISK - UNIVERSAL INTERVENTIONS
Bed in lowest position, wheels locked, appropriate side rails in place/Call bell, personal items and telephone in reach/Instruct patient to call for assistance before getting out of bed or chair/Non-slip footwear when patient is out of bed/[place] to call system/Physically safe environment - no spills, clutter or unnecessary equipment/Purposeful Proactive Rounding/Room/bathroom lighting operational, light cord in reach

## 2022-03-26 ENCOUNTER — APPOINTMENT (OUTPATIENT)
Dept: SURGERY | Facility: HOSPITAL | Age: 72
End: 2022-03-26

## 2022-03-26 ENCOUNTER — INPATIENT (INPATIENT)
Facility: HOSPITAL | Age: 72
LOS: 2 days | Discharge: HOME | DRG: 418 | End: 2022-03-29
Attending: GENERAL ACUTE CARE HOSPITAL | Admitting: GENERAL ACUTE CARE HOSPITAL
Payer: MEDICARE

## 2022-03-26 ENCOUNTER — RESULT REVIEW (OUTPATIENT)
Age: 72
End: 2022-03-26

## 2022-03-26 DIAGNOSIS — Z98.890 OTHER SPECIFIED POSTPROCEDURAL STATES: Chronic | ICD-10-CM

## 2022-03-26 DIAGNOSIS — Z98.89 OTHER SPECIFIED POSTPROCEDURAL STATES: Chronic | ICD-10-CM

## 2022-03-26 DIAGNOSIS — I26.99 OTHER PULMONARY EMBOLISM WITHOUT ACUTE COR PULMONALE: ICD-10-CM

## 2022-03-26 LAB
ALBUMIN SERPL ELPH-MCNC: 3.3 G/DL — SIGNIFICANT CHANGE UP (ref 3.3–5)
ALP SERPL-CCNC: 92 U/L — SIGNIFICANT CHANGE UP (ref 40–120)
ALT FLD-CCNC: 52 U/L — HIGH (ref 10–45)
ANION GAP SERPL CALC-SCNC: 13 MMOL/L — SIGNIFICANT CHANGE UP (ref 5–17)
APTT BLD: 22.3 SEC — LOW (ref 27.5–35.5)
APTT BLD: 27.4 SEC — LOW (ref 27.5–35.5)
AST SERPL-CCNC: 49 U/L — HIGH (ref 10–40)
BASOPHILS # BLD AUTO: 0 K/UL — SIGNIFICANT CHANGE UP (ref 0–0.2)
BASOPHILS # BLD AUTO: 0.04 K/UL — SIGNIFICANT CHANGE UP (ref 0–0.2)
BASOPHILS NFR BLD AUTO: 0 % — SIGNIFICANT CHANGE UP (ref 0–2)
BASOPHILS NFR BLD AUTO: 0.6 % — SIGNIFICANT CHANGE UP (ref 0–2)
BILIRUB SERPL-MCNC: 0.5 MG/DL — SIGNIFICANT CHANGE UP (ref 0.2–1.2)
BLD GP AB SCN SERPL QL: NEGATIVE — SIGNIFICANT CHANGE UP
BUN SERPL-MCNC: 11 MG/DL — SIGNIFICANT CHANGE UP (ref 7–23)
CALCIUM SERPL-MCNC: 8.6 MG/DL — SIGNIFICANT CHANGE UP (ref 8.4–10.5)
CHLORIDE SERPL-SCNC: 103 MMOL/L — SIGNIFICANT CHANGE UP (ref 96–108)
CO2 SERPL-SCNC: 23 MMOL/L — SIGNIFICANT CHANGE UP (ref 22–31)
CREAT SERPL-MCNC: 0.65 MG/DL — SIGNIFICANT CHANGE UP (ref 0.5–1.3)
EGFR: 99 ML/MIN/1.73M2 — SIGNIFICANT CHANGE UP
EOSINOPHIL # BLD AUTO: 0 K/UL — SIGNIFICANT CHANGE UP (ref 0–0.5)
EOSINOPHIL # BLD AUTO: 0.07 K/UL — SIGNIFICANT CHANGE UP (ref 0–0.5)
EOSINOPHIL NFR BLD AUTO: 0 % — SIGNIFICANT CHANGE UP (ref 0–6)
EOSINOPHIL NFR BLD AUTO: 1 % — SIGNIFICANT CHANGE UP (ref 0–6)
GIANT PLATELETS BLD QL SMEAR: PRESENT — SIGNIFICANT CHANGE UP
GLUCOSE BLDC GLUCOMTR-MCNC: 114 MG/DL — HIGH (ref 70–99)
GLUCOSE BLDC GLUCOMTR-MCNC: 131 MG/DL — HIGH (ref 70–99)
GLUCOSE SERPL-MCNC: 149 MG/DL — HIGH (ref 70–99)
HCT VFR BLD CALC: 36.8 % — LOW (ref 39–50)
HCT VFR BLD CALC: 37.4 % — LOW (ref 39–50)
HGB BLD-MCNC: 12.1 G/DL — LOW (ref 13–17)
HGB BLD-MCNC: 12.3 G/DL — LOW (ref 13–17)
HYPOCHROMIA BLD QL: SLIGHT — SIGNIFICANT CHANGE UP
IMM GRANULOCYTES NFR BLD AUTO: 0.6 % — SIGNIFICANT CHANGE UP (ref 0–1.5)
INR BLD: 1.22 — HIGH (ref 0.88–1.16)
INR BLD: 1.27 — HIGH (ref 0.88–1.16)
LYMPHOCYTES # BLD AUTO: 0.09 K/UL — LOW (ref 1–3.3)
LYMPHOCYTES # BLD AUTO: 0.8 % — LOW (ref 13–44)
LYMPHOCYTES # BLD AUTO: 1.17 K/UL — SIGNIFICANT CHANGE UP (ref 1–3.3)
LYMPHOCYTES # BLD AUTO: 16.1 % — SIGNIFICANT CHANGE UP (ref 13–44)
MAGNESIUM SERPL-MCNC: 1.5 MG/DL — LOW (ref 1.6–2.6)
MANUAL SMEAR VERIFICATION: SIGNIFICANT CHANGE UP
MCHC RBC-ENTMCNC: 29.9 PG — SIGNIFICANT CHANGE UP (ref 27–34)
MCHC RBC-ENTMCNC: 30.8 PG — SIGNIFICANT CHANGE UP (ref 27–34)
MCHC RBC-ENTMCNC: 32.4 GM/DL — SIGNIFICANT CHANGE UP (ref 32–36)
MCHC RBC-ENTMCNC: 33.4 GM/DL — SIGNIFICANT CHANGE UP (ref 32–36)
MCV RBC AUTO: 92.2 FL — SIGNIFICANT CHANGE UP (ref 80–100)
MCV RBC AUTO: 92.3 FL — SIGNIFICANT CHANGE UP (ref 80–100)
MONOCYTES # BLD AUTO: 0.65 K/UL — SIGNIFICANT CHANGE UP (ref 0–0.9)
MONOCYTES # BLD AUTO: 0.74 K/UL — SIGNIFICANT CHANGE UP (ref 0–0.9)
MONOCYTES NFR BLD AUTO: 10.2 % — SIGNIFICANT CHANGE UP (ref 2–14)
MONOCYTES NFR BLD AUTO: 6.1 % — SIGNIFICANT CHANGE UP (ref 2–14)
MYELOCYTES NFR BLD: 0.9 % — HIGH (ref 0–0)
NEUTROPHILS # BLD AUTO: 5.21 K/UL — SIGNIFICANT CHANGE UP (ref 1.8–7.4)
NEUTROPHILS # BLD AUTO: 9.87 K/UL — HIGH (ref 1.8–7.4)
NEUTROPHILS NFR BLD AUTO: 71.5 % — SIGNIFICANT CHANGE UP (ref 43–77)
NEUTROPHILS NFR BLD AUTO: 91.3 % — HIGH (ref 43–77)
NEUTS BAND # BLD: 0.9 % — SIGNIFICANT CHANGE UP (ref 0–8)
NRBC # BLD: 0 /100 WBCS — SIGNIFICANT CHANGE UP (ref 0–0)
PHOSPHATE SERPL-MCNC: 2.4 MG/DL — LOW (ref 2.5–4.5)
PLAT MORPH BLD: ABNORMAL
PLATELET # BLD AUTO: 189 K/UL — SIGNIFICANT CHANGE UP (ref 150–400)
PLATELET # BLD AUTO: 262 K/UL — SIGNIFICANT CHANGE UP (ref 150–400)
POLYCHROMASIA BLD QL SMEAR: SLIGHT — SIGNIFICANT CHANGE UP
POTASSIUM SERPL-MCNC: 4.4 MMOL/L — SIGNIFICANT CHANGE UP (ref 3.5–5.3)
POTASSIUM SERPL-SCNC: 4.4 MMOL/L — SIGNIFICANT CHANGE UP (ref 3.5–5.3)
PROT SERPL-MCNC: 6.6 G/DL — SIGNIFICANT CHANGE UP (ref 6–8.3)
PROTHROM AB SERPL-ACNC: 14.6 SEC — HIGH (ref 10.5–13.4)
PROTHROM AB SERPL-ACNC: 15.2 SEC — HIGH (ref 10.5–13.4)
RBC # BLD: 3.99 M/UL — LOW (ref 4.2–5.8)
RBC # BLD: 4.05 M/UL — LOW (ref 4.2–5.8)
RBC # FLD: 13.1 % — SIGNIFICANT CHANGE UP (ref 10.3–14.5)
RBC # FLD: 13.1 % — SIGNIFICANT CHANGE UP (ref 10.3–14.5)
RBC BLD AUTO: ABNORMAL
RH IG SCN BLD-IMP: POSITIVE — SIGNIFICANT CHANGE UP
SODIUM SERPL-SCNC: 139 MMOL/L — SIGNIFICANT CHANGE UP (ref 135–145)
WBC # BLD: 10.7 K/UL — HIGH (ref 3.8–10.5)
WBC # BLD: 7.27 K/UL — SIGNIFICANT CHANGE UP (ref 3.8–10.5)
WBC # FLD AUTO: 10.7 K/UL — HIGH (ref 3.8–10.5)
WBC # FLD AUTO: 7.27 K/UL — SIGNIFICANT CHANGE UP (ref 3.8–10.5)

## 2022-03-26 PROCEDURE — 88304 TISSUE EXAM BY PATHOLOGIST: CPT | Mod: 26

## 2022-03-26 PROCEDURE — 99222 1ST HOSP IP/OBS MODERATE 55: CPT | Mod: 57

## 2022-03-26 PROCEDURE — 99222 1ST HOSP IP/OBS MODERATE 55: CPT | Mod: GC

## 2022-03-26 PROCEDURE — 47562 LAPAROSCOPIC CHOLECYSTECTOMY: CPT | Mod: 62

## 2022-03-26 PROCEDURE — 47562 LAPAROSCOPIC CHOLECYSTECTOMY: CPT | Mod: 62,22

## 2022-03-26 DEVICE — CLIP APPLIER ETHICON LIGAMAX 5MM: Type: IMPLANTABLE DEVICE | Status: FUNCTIONAL

## 2022-03-26 DEVICE — SURGICEL NU-KNIT 6 X 9": Type: IMPLANTABLE DEVICE | Status: FUNCTIONAL

## 2022-03-26 DEVICE — CHOLANGIOGRAM CATH BOSTON SCIENTIFIC SPYGLASS DISCOVER T/A: Type: IMPLANTABLE DEVICE | Status: FUNCTIONAL

## 2022-03-26 RX ORDER — HYDROMORPHONE HYDROCHLORIDE 2 MG/ML
0.5 INJECTION INTRAMUSCULAR; INTRAVENOUS; SUBCUTANEOUS EVERY 4 HOURS
Refills: 0 | Status: DISCONTINUED | OUTPATIENT
Start: 2022-03-26 | End: 2022-03-27

## 2022-03-26 RX ORDER — HYDROMORPHONE HYDROCHLORIDE 2 MG/ML
1 INJECTION INTRAMUSCULAR; INTRAVENOUS; SUBCUTANEOUS EVERY 4 HOURS
Refills: 0 | Status: DISCONTINUED | OUTPATIENT
Start: 2022-03-26 | End: 2022-03-27

## 2022-03-26 RX ORDER — INSULIN LISPRO 100/ML
VIAL (ML) SUBCUTANEOUS
Refills: 0 | Status: DISCONTINUED | OUTPATIENT
Start: 2022-03-26 | End: 2022-03-26

## 2022-03-26 RX ORDER — HYDROMORPHONE HYDROCHLORIDE 2 MG/ML
0.5 INJECTION INTRAMUSCULAR; INTRAVENOUS; SUBCUTANEOUS ONCE
Refills: 0 | Status: DISCONTINUED | OUTPATIENT
Start: 2022-03-26 | End: 2022-03-26

## 2022-03-26 RX ORDER — LOSARTAN POTASSIUM 100 MG/1
0 TABLET, FILM COATED ORAL
Qty: 0 | Refills: 0 | DISCHARGE

## 2022-03-26 RX ORDER — CHLORHEXIDINE GLUCONATE 213 G/1000ML
1 SOLUTION TOPICAL
Refills: 0 | Status: DISCONTINUED | OUTPATIENT
Start: 2022-03-26 | End: 2022-03-29

## 2022-03-26 RX ORDER — LANOLIN ALCOHOL/MO/W.PET/CERES
3 CREAM (GRAM) TOPICAL ONCE
Refills: 0 | Status: COMPLETED | OUTPATIENT
Start: 2022-03-26 | End: 2022-03-26

## 2022-03-26 RX ORDER — SODIUM CHLORIDE 9 MG/ML
1000 INJECTION, SOLUTION INTRAVENOUS
Refills: 0 | Status: DISCONTINUED | OUTPATIENT
Start: 2022-03-26 | End: 2022-03-29

## 2022-03-26 RX ORDER — GLUCAGON INJECTION, SOLUTION 0.5 MG/.1ML
1 INJECTION, SOLUTION SUBCUTANEOUS ONCE
Refills: 0 | Status: DISCONTINUED | OUTPATIENT
Start: 2022-03-26 | End: 2022-03-29

## 2022-03-26 RX ORDER — HEPARIN SODIUM 5000 [USP'U]/ML
5000 INJECTION INTRAVENOUS; SUBCUTANEOUS EVERY 8 HOURS
Refills: 0 | Status: DISCONTINUED | OUTPATIENT
Start: 2022-03-26 | End: 2022-03-29

## 2022-03-26 RX ORDER — MAGNESIUM SULFATE 500 MG/ML
2 VIAL (ML) INJECTION ONCE
Refills: 0 | Status: COMPLETED | OUTPATIENT
Start: 2022-03-26 | End: 2022-03-26

## 2022-03-26 RX ORDER — CEFTRIAXONE 500 MG/1
2000 INJECTION, POWDER, FOR SOLUTION INTRAMUSCULAR; INTRAVENOUS EVERY 24 HOURS
Refills: 0 | Status: DISCONTINUED | OUTPATIENT
Start: 2022-03-26 | End: 2022-03-29

## 2022-03-26 RX ORDER — BUPIVACAINE 13.3 MG/ML
20 INJECTION, SUSPENSION, LIPOSOMAL INFILTRATION ONCE
Refills: 0 | Status: DISCONTINUED | OUTPATIENT
Start: 2022-03-26 | End: 2022-03-28

## 2022-03-26 RX ORDER — DEXTROSE 50 % IN WATER 50 %
15 SYRINGE (ML) INTRAVENOUS ONCE
Refills: 0 | Status: DISCONTINUED | OUTPATIENT
Start: 2022-03-26 | End: 2022-03-29

## 2022-03-26 RX ORDER — INSULIN LISPRO 100/ML
VIAL (ML) SUBCUTANEOUS EVERY 6 HOURS
Refills: 0 | Status: DISCONTINUED | OUTPATIENT
Start: 2022-03-26 | End: 2022-03-29

## 2022-03-26 RX ORDER — ROSUVASTATIN CALCIUM 5 MG/1
1 TABLET ORAL
Qty: 0 | Refills: 0 | DISCHARGE

## 2022-03-26 RX ORDER — DEXTROSE 50 % IN WATER 50 %
25 SYRINGE (ML) INTRAVENOUS ONCE
Refills: 0 | Status: DISCONTINUED | OUTPATIENT
Start: 2022-03-26 | End: 2022-03-29

## 2022-03-26 RX ORDER — DEXTROSE 50 % IN WATER 50 %
12.5 SYRINGE (ML) INTRAVENOUS ONCE
Refills: 0 | Status: DISCONTINUED | OUTPATIENT
Start: 2022-03-26 | End: 2022-03-29

## 2022-03-26 RX ORDER — APIXABAN 2.5 MG/1
1 TABLET, FILM COATED ORAL
Qty: 0 | Refills: 0 | DISCHARGE

## 2022-03-26 RX ORDER — METRONIDAZOLE 500 MG
500 TABLET ORAL EVERY 8 HOURS
Refills: 0 | Status: DISCONTINUED | OUTPATIENT
Start: 2022-03-26 | End: 2022-03-29

## 2022-03-26 RX ADMIN — HEPARIN SODIUM 5000 UNIT(S): 5000 INJECTION INTRAVENOUS; SUBCUTANEOUS at 22:02

## 2022-03-26 RX ADMIN — Medication 3 MILLIGRAM(S): at 23:47

## 2022-03-26 RX ADMIN — HYDROMORPHONE HYDROCHLORIDE 1 MILLIGRAM(S): 2 INJECTION INTRAMUSCULAR; INTRAVENOUS; SUBCUTANEOUS at 21:02

## 2022-03-26 RX ADMIN — Medication 100 MILLIGRAM(S): at 18:21

## 2022-03-26 RX ADMIN — HYDROMORPHONE HYDROCHLORIDE 0.5 MILLIGRAM(S): 2 INJECTION INTRAMUSCULAR; INTRAVENOUS; SUBCUTANEOUS at 18:57

## 2022-03-26 RX ADMIN — HYDROMORPHONE HYDROCHLORIDE 0.5 MILLIGRAM(S): 2 INJECTION INTRAMUSCULAR; INTRAVENOUS; SUBCUTANEOUS at 17:45

## 2022-03-26 RX ADMIN — CEFTRIAXONE 100 MILLIGRAM(S): 500 INJECTION, POWDER, FOR SOLUTION INTRAMUSCULAR; INTRAVENOUS at 18:20

## 2022-03-26 RX ADMIN — HYDROMORPHONE HYDROCHLORIDE 1 MILLIGRAM(S): 2 INJECTION INTRAMUSCULAR; INTRAVENOUS; SUBCUTANEOUS at 22:00

## 2022-03-26 RX ADMIN — HYDROMORPHONE HYDROCHLORIDE 0.5 MILLIGRAM(S): 2 INJECTION INTRAMUSCULAR; INTRAVENOUS; SUBCUTANEOUS at 18:20

## 2022-03-26 RX ADMIN — HYDROMORPHONE HYDROCHLORIDE 0.5 MILLIGRAM(S): 2 INJECTION INTRAMUSCULAR; INTRAVENOUS; SUBCUTANEOUS at 23:26

## 2022-03-26 RX ADMIN — SODIUM CHLORIDE 100 MILLILITER(S): 9 INJECTION, SOLUTION INTRAVENOUS at 18:21

## 2022-03-26 RX ADMIN — Medication 25 GRAM(S): at 19:50

## 2022-03-26 RX ADMIN — Medication 62.5 MILLIMOLE(S): at 20:52

## 2022-03-26 RX ADMIN — HYDROMORPHONE HYDROCHLORIDE 0.5 MILLIGRAM(S): 2 INJECTION INTRAMUSCULAR; INTRAVENOUS; SUBCUTANEOUS at 18:42

## 2022-03-26 RX ADMIN — SODIUM CHLORIDE 100 MILLILITER(S): 9 INJECTION, SOLUTION INTRAVENOUS at 20:52

## 2022-03-26 NOTE — H&P ADULT - ASSESSMENT
71M pmh psoriatic arthritis (previous Otezla), hepatitis C (cured 8 years ago), cirrhosis, tracheotomy at age 2 (decanulated long time ago), complete heartblock s/p pacemaker 2017, parox afib on eliquis presents for Elective Robot Assisted Cholecystectomy.     Pre Op Consent  Additional recommendation pending OR

## 2022-03-26 NOTE — CONSULT NOTE ADULT - SUBJECTIVE AND OBJECTIVE BOX
SICU CONSULT NOTE    HPI:  71M pmh  psoriatic arthritis (previous Otezla), hepatitis C (cured 8 years ago), cirrhosis, tracheotomy at age 2 (decanulated long time ago), complete heartblock s/p pacemaker 2017, parox afib on eliquis, shoulder surgery, knee surgery 9 months ago (HSS) c/b inflammation requiring aspirations, mild sleep apnea, opioid use w/ high tolerance, recently presented to ED 3/6 w/ abd pain, RUQ US w/ CBD 0.5cm, gallstones and sludge in GB w/1.3cm mobile stone at neck, thickened GB wall, positive turner's, suggesting cholelithiasis w/ cholecystitis. Pt now admitted to [facility] today for elective Robotic Assist Cholecystectomy (26 Mar 2022 18:42)      SICU ADDENDUM: Laparoscopic Cholecystectomy performed. Extensive dissection however patient remained stable through out procedure. , 2500 Crystalloids. In the ICU he remained stable. T 98.5 HR 93, /84, RR 22, O2 100% on RA. He received 1mg Dilaudid for pain. Admitted to SICU for closer HD monitoring     PAST MEDICAL & SURGICAL HISTORY:  Nephrolithiasis    HLD (hyperlipidemia)    S/P right knee surgery    History of surgery  PPM    History of surgery  tracheostomy    H/O hernia repair    History of surgery  rt shoulder        PAST SURGICAL HISTORY:     REVIEW OF SYSTEMS:   Pertinent positives/negatives noted in HPI.     HOME MEDICATIONS:    ALLERGIES:  Allergies    penicillin (Anaphylaxis)  predniSONE (Rash)  shellfish (Anaphylaxis)    Intolerances        SOCIAL HISTORY:    FAMILY HISTORY:      Vital Signs Last 24 Hrs  T(C): 36.9 (26 Mar 2022 18:20), Max: 36.9 (26 Mar 2022 18:20)  T(F): 98.5 (26 Mar 2022 18:20), Max: 98.5 (26 Mar 2022 18:20)  HR: 93 (26 Mar 2022 18:30) (78 - 992)  BP: 181/84 (26 Mar 2022 18:30) (145/66 - 183/77)  BP(mean): 121 (26 Mar 2022 18:30) (93 - 122)  RR: 22 (26 Mar 2022 17:55) (18 - 24)  SpO2: 100% (26 Mar 2022 18:30) (98% - 100%)    PHYSICAL EXAM:  General: NAD, resting comfortably in bed  C/V: paced rated,  Pulm: Nonlabored breathing, no respiratory distress on RA, CTAB  Abd: soft, non-distended, incisions intact w/ dermabond, R sided drain w/ ss output  Extrem: WWP, no edema,  Neuro: A/O x 3, CNs II-XII grossly intact, no focal deficits, normal sensation  Pulses: palpable distal pulses     LABS:                        12.3   10.70 )-----------( 189      ( 26 Mar 2022 18:39 )             36.8           PT/INR - ( 26 Mar 2022 13:27 )   PT: 14.6 sec;   INR: 1.22          PTT - ( 26 Mar 2022 13:27 )  PTT:22.3 sec    RADIOLOGY:   < from: US Abdomen Limited (03.06.22 @ 14:36) >  FINDINGS:    Liver: Cirrhosis.  Bile ducts: Normal caliber.  Common bile duct measures 0.5 cm.  Gallbladder: There are mobile gallstones and sludge within the   gallbladder causing a 1.3 cm mobile stone at the neck. Positive   sonographic Turner sign. The gallbladder wall appears thickened.  Pancreas: Visualized portions are within normal limits.  Right kidney: 11.3 cm. No hydronephrosis.  Ascites: None.  Aorta and IVC: Visualized portions are within normal limits.      IMPRESSION:    Cholelithiasis with positive sonographic Turner sign may represent   cholecystitis in the appropriate clinical context.    < end of copied text >

## 2022-03-26 NOTE — CONSULT NOTE ADULT - ASSESSMENT
71M pmh psoriatic arthritis (previous Otezla), hepatitis C (cured 8 years ago), cirrhosis, tracheotomy at age 2 (decanulated long time ago), complete heartblock s/p pacemaker 2017, parox afib on eliquis, shoulder surgery, knee surgery 9 months ago (HSS) c/b inflammation requiring aspirations, mild sleep apnea, opioid use w/ high tolerance, recently presented to ED 3/6 w/cholelithiasis cholecystitis, admitted today (3/26) for elective robotic cholecystectomy, intraop severely inflammed GB requiring difficult dissection. Admitted to SICU for HD monitoring.     NEURO: Dilaudid PRN.   CV: Afib on Eliquis (Holding given OR blood loss, complete HB (paced), holding home Eliquis.   PULM: nasal canula  GI/FEN: CLD, LR@100  : TOV @ 1am  ENDO: ISS  ID: ceftriaxone/flagyl (3/26--)   PPX: SCDs, SQH  LINES: PIVs,   WOUNDS/DRAINS: lap sites

## 2022-03-26 NOTE — H&P ADULT - NSHPPHYSICALEXAM_GEN_ALL_CORE
PHYSICAL EXAM:  General: NAD, resting comfortably in bed  C/V: NSR  Pulm: Nonlabored breathing, no respiratory distress  Abd: soft, non-distended, RUQ tenderness, no rebound or guarding  Extrem: WWP, no edema,  Neuro: A/O x 3, CNs II-XII grossly intact, no focal deficits, normal sensation  Pulses: palpable distal pulses

## 2022-03-26 NOTE — H&P ADULT - HISTORY OF PRESENT ILLNESS
71M pmh  psoriatic arthritis (previous Otezla), hepatitis C (cured 8 years ago), cirrhosis, tracheotomy at age 2 (decanulated long time ago), complete heartblock s/p pacemaker 2017, parox afib on eliquis, shoulder surgery, knee surgery 9 months ago (HSS) c/b inflammation requiring aspirations, mild sleep apnea, opioid use w/ high tolerance, recently presented to ED 3/6 w/ abd pain, RUQ US w/ CBD 0.5cm, gallstones and sludge in GB w/1.3cm mobile stone at neck, thickened GB wall, positive funk's, suggesting cholelithiasis w/ cholecystitis. Pt now admitted to [facility] today for elective Robotic Assist Cholecystectomy

## 2022-03-26 NOTE — BRIEF OPERATIVE NOTE - OPERATION/FINDINGS
Laparoscopic Cholecystectomy Laparoscopic Cholecystectomy    Was not present during initial access and dissection, see operative report for further detail. GB identified inflamed and edematous. Decision made for top down approach. GB noted to be perforated posteriorly. Noted bleeding  Dissected off cystic plate, posterior wall fused to liver. Noted bleeding at posterior wall controlled w/ endoclips. Dissection carried to infundibulum. Artery ligated. Duct confirmed w/ ICG and divided w/ endoloop and Endoshears. No noted bile leak via ICG. GB removed  with endocatch bag. Hemostasis ensured. Fascia closed w/ Vicryl, skin w/ monocyl Laparoscopic Cholecystectomy    Was not present during initial access and dissection, see operative report for further detail. GB identified inflamed and edematous. Decision made for top down approach. GB noted to be perforated posteriorly w/ spillage of stones. Dissected off cystic plate, posterior wall fused to liver. Noted bleeding vessel at posterior wall controlled w/ endoclips. Dissection carried to infundibulum. Artery ligated. Duct confirmed w/ ICG and divided w/ endoloop and Endoshears. No noted bile leak via ICG. GB removed  with endocatch bag. Hemostasis ensured. 19Fr. Willy drain placed in surgical bed. Fascia closed w/ Vicryl, skin w/ monocyl

## 2022-03-26 NOTE — CONSULT NOTE ADULT - ATTENDING COMMENTS
s/p open cholecystectomy in patient with h/o cirrhosis from treated HCV, PAF on eliquis with PPM, psoriatic arthritis  physical as above  dilaudid for pain  bladder scan 284 ml; may need coffey if can't void  ceftriaxone and flagyl  hold AC  watch HR

## 2022-03-26 NOTE — PROVIDER CONTACT NOTE (OTHER) - ASSESSMENT
pt urine dribbles with the urinal. placed on condom cath. pt voided 150cc, pvr 222 an hour later. pt is continuously voiding but remained c/o of urgency that is unresolved regardless of positioning in the bed. asked the pt to stand up, got dizzy. pt is sitting on the edge of the bed, feet dangled. pt urine dribbles. placed on condom cath. pt voided 150cc, pvr 222 an hour later. pt is continuously voiding but remained c/o of urgency that is unresolved regardless of positioning in the bed. asked the pt to stand up, got dizzy. pt is sitting on the edge of the bed, feet dangled. pt urine dribbles. placed condom cath. pt voided 150cc, pvr 222 an hour later. pt is continuously voiding but remained c/o of urgency that is unresolved regardless of positioning in the bed. asked the pt to stand up, got dizzy. pt is sitting on the edge of the bed, feet dangled.

## 2022-03-27 LAB
ALBUMIN SERPL ELPH-MCNC: 3 G/DL — LOW (ref 3.3–5)
ALP SERPL-CCNC: 78 U/L — SIGNIFICANT CHANGE UP (ref 40–120)
ALT FLD-CCNC: 40 U/L — SIGNIFICANT CHANGE UP (ref 10–45)
ANION GAP SERPL CALC-SCNC: 8 MMOL/L — SIGNIFICANT CHANGE UP (ref 5–17)
AST SERPL-CCNC: 36 U/L — SIGNIFICANT CHANGE UP (ref 10–40)
BASOPHILS # BLD AUTO: 0.01 K/UL — SIGNIFICANT CHANGE UP (ref 0–0.2)
BASOPHILS NFR BLD AUTO: 0.2 % — SIGNIFICANT CHANGE UP (ref 0–2)
BILIRUB SERPL-MCNC: 0.4 MG/DL — SIGNIFICANT CHANGE UP (ref 0.2–1.2)
BUN SERPL-MCNC: 9 MG/DL — SIGNIFICANT CHANGE UP (ref 7–23)
CALCIUM SERPL-MCNC: 8.5 MG/DL — SIGNIFICANT CHANGE UP (ref 8.4–10.5)
CHLORIDE SERPL-SCNC: 103 MMOL/L — SIGNIFICANT CHANGE UP (ref 96–108)
CO2 SERPL-SCNC: 27 MMOL/L — SIGNIFICANT CHANGE UP (ref 22–31)
CREAT SERPL-MCNC: 0.65 MG/DL — SIGNIFICANT CHANGE UP (ref 0.5–1.3)
EGFR: 99 ML/MIN/1.73M2 — SIGNIFICANT CHANGE UP
EOSINOPHIL # BLD AUTO: 0.02 K/UL — SIGNIFICANT CHANGE UP (ref 0–0.5)
EOSINOPHIL NFR BLD AUTO: 0.3 % — SIGNIFICANT CHANGE UP (ref 0–6)
GLUCOSE BLDC GLUCOMTR-MCNC: 101 MG/DL — HIGH (ref 70–99)
GLUCOSE BLDC GLUCOMTR-MCNC: 109 MG/DL — HIGH (ref 70–99)
GLUCOSE BLDC GLUCOMTR-MCNC: 126 MG/DL — HIGH (ref 70–99)
GLUCOSE BLDC GLUCOMTR-MCNC: 95 MG/DL — SIGNIFICANT CHANGE UP (ref 70–99)
GLUCOSE SERPL-MCNC: 113 MG/DL — HIGH (ref 70–99)
HCT VFR BLD CALC: 31.8 % — LOW (ref 39–50)
HCV AB S/CO SERPL IA: 25.97 S/CO — HIGH
HCV AB SERPL-IMP: REACTIVE
HGB BLD-MCNC: 10.4 G/DL — LOW (ref 13–17)
IMM GRANULOCYTES NFR BLD AUTO: 0.6 % — SIGNIFICANT CHANGE UP (ref 0–1.5)
LYMPHOCYTES # BLD AUTO: 0.8 K/UL — LOW (ref 1–3.3)
LYMPHOCYTES # BLD AUTO: 12.2 % — LOW (ref 13–44)
MAGNESIUM SERPL-MCNC: 1.9 MG/DL — SIGNIFICANT CHANGE UP (ref 1.6–2.6)
MCHC RBC-ENTMCNC: 30 PG — SIGNIFICANT CHANGE UP (ref 27–34)
MCHC RBC-ENTMCNC: 32.7 GM/DL — SIGNIFICANT CHANGE UP (ref 32–36)
MCV RBC AUTO: 91.6 FL — SIGNIFICANT CHANGE UP (ref 80–100)
MONOCYTES # BLD AUTO: 0.9 K/UL — SIGNIFICANT CHANGE UP (ref 0–0.9)
MONOCYTES NFR BLD AUTO: 13.7 % — SIGNIFICANT CHANGE UP (ref 2–14)
NEUTROPHILS # BLD AUTO: 4.78 K/UL — SIGNIFICANT CHANGE UP (ref 1.8–7.4)
NEUTROPHILS NFR BLD AUTO: 73 % — SIGNIFICANT CHANGE UP (ref 43–77)
NRBC # BLD: 0 /100 WBCS — SIGNIFICANT CHANGE UP (ref 0–0)
PHOSPHATE SERPL-MCNC: 3.5 MG/DL — SIGNIFICANT CHANGE UP (ref 2.5–4.5)
PLATELET # BLD AUTO: 158 K/UL — SIGNIFICANT CHANGE UP (ref 150–400)
POTASSIUM SERPL-MCNC: 4.4 MMOL/L — SIGNIFICANT CHANGE UP (ref 3.5–5.3)
POTASSIUM SERPL-SCNC: 4.4 MMOL/L — SIGNIFICANT CHANGE UP (ref 3.5–5.3)
PROT SERPL-MCNC: 6 G/DL — SIGNIFICANT CHANGE UP (ref 6–8.3)
RBC # BLD: 3.47 M/UL — LOW (ref 4.2–5.8)
RBC # FLD: 13.2 % — SIGNIFICANT CHANGE UP (ref 10.3–14.5)
SODIUM SERPL-SCNC: 138 MMOL/L — SIGNIFICANT CHANGE UP (ref 135–145)
WBC # BLD: 6.55 K/UL — SIGNIFICANT CHANGE UP (ref 3.8–10.5)
WBC # FLD AUTO: 6.55 K/UL — SIGNIFICANT CHANGE UP (ref 3.8–10.5)

## 2022-03-27 PROCEDURE — 99232 SBSQ HOSP IP/OBS MODERATE 35: CPT | Mod: GC

## 2022-03-27 RX ORDER — OXYCODONE HYDROCHLORIDE 5 MG/1
5 TABLET ORAL EVERY 6 HOURS
Refills: 0 | Status: DISCONTINUED | OUTPATIENT
Start: 2022-03-27 | End: 2022-03-29

## 2022-03-27 RX ORDER — ACETAMINOPHEN 500 MG
650 TABLET ORAL EVERY 6 HOURS
Refills: 0 | Status: DISCONTINUED | OUTPATIENT
Start: 2022-03-27 | End: 2022-03-29

## 2022-03-27 RX ORDER — HYDROMORPHONE HYDROCHLORIDE 2 MG/ML
0.5 INJECTION INTRAMUSCULAR; INTRAVENOUS; SUBCUTANEOUS ONCE
Refills: 0 | Status: DISCONTINUED | OUTPATIENT
Start: 2022-03-27 | End: 2022-03-27

## 2022-03-27 RX ORDER — MAGNESIUM SULFATE 500 MG/ML
1 VIAL (ML) INJECTION ONCE
Refills: 0 | Status: COMPLETED | OUTPATIENT
Start: 2022-03-27 | End: 2022-03-27

## 2022-03-27 RX ORDER — OXYCODONE HYDROCHLORIDE 5 MG/1
10 TABLET ORAL EVERY 6 HOURS
Refills: 0 | Status: DISCONTINUED | OUTPATIENT
Start: 2022-03-27 | End: 2022-03-29

## 2022-03-27 RX ORDER — HYDROMORPHONE HYDROCHLORIDE 2 MG/ML
0.5 INJECTION INTRAMUSCULAR; INTRAVENOUS; SUBCUTANEOUS EVERY 6 HOURS
Refills: 0 | Status: DISCONTINUED | OUTPATIENT
Start: 2022-03-27 | End: 2022-03-29

## 2022-03-27 RX ADMIN — HYDROMORPHONE HYDROCHLORIDE 0.5 MILLIGRAM(S): 2 INJECTION INTRAMUSCULAR; INTRAVENOUS; SUBCUTANEOUS at 04:02

## 2022-03-27 RX ADMIN — Medication 650 MILLIGRAM(S): at 11:40

## 2022-03-27 RX ADMIN — OXYCODONE HYDROCHLORIDE 10 MILLIGRAM(S): 5 TABLET ORAL at 16:00

## 2022-03-27 RX ADMIN — OXYCODONE HYDROCHLORIDE 10 MILLIGRAM(S): 5 TABLET ORAL at 22:15

## 2022-03-27 RX ADMIN — Medication 650 MILLIGRAM(S): at 23:20

## 2022-03-27 RX ADMIN — Medication 100 MILLIGRAM(S): at 05:45

## 2022-03-27 RX ADMIN — HYDROMORPHONE HYDROCHLORIDE 1 MILLIGRAM(S): 2 INJECTION INTRAMUSCULAR; INTRAVENOUS; SUBCUTANEOUS at 05:44

## 2022-03-27 RX ADMIN — SODIUM CHLORIDE 100 MILLILITER(S): 9 INJECTION, SOLUTION INTRAVENOUS at 23:20

## 2022-03-27 RX ADMIN — HEPARIN SODIUM 5000 UNIT(S): 5000 INJECTION INTRAVENOUS; SUBCUTANEOUS at 05:44

## 2022-03-27 RX ADMIN — HYDROMORPHONE HYDROCHLORIDE 0.5 MILLIGRAM(S): 2 INJECTION INTRAMUSCULAR; INTRAVENOUS; SUBCUTANEOUS at 10:10

## 2022-03-27 RX ADMIN — HYDROMORPHONE HYDROCHLORIDE 0.5 MILLIGRAM(S): 2 INJECTION INTRAMUSCULAR; INTRAVENOUS; SUBCUTANEOUS at 09:54

## 2022-03-27 RX ADMIN — HEPARIN SODIUM 5000 UNIT(S): 5000 INJECTION INTRAVENOUS; SUBCUTANEOUS at 21:15

## 2022-03-27 RX ADMIN — CEFTRIAXONE 100 MILLIGRAM(S): 500 INJECTION, POWDER, FOR SOLUTION INTRAMUSCULAR; INTRAVENOUS at 17:23

## 2022-03-27 RX ADMIN — HEPARIN SODIUM 5000 UNIT(S): 5000 INJECTION INTRAVENOUS; SUBCUTANEOUS at 13:36

## 2022-03-27 RX ADMIN — OXYCODONE HYDROCHLORIDE 10 MILLIGRAM(S): 5 TABLET ORAL at 09:15

## 2022-03-27 RX ADMIN — HYDROMORPHONE HYDROCHLORIDE 0.5 MILLIGRAM(S): 2 INJECTION INTRAMUSCULAR; INTRAVENOUS; SUBCUTANEOUS at 05:00

## 2022-03-27 RX ADMIN — HYDROMORPHONE HYDROCHLORIDE 1 MILLIGRAM(S): 2 INJECTION INTRAMUSCULAR; INTRAVENOUS; SUBCUTANEOUS at 01:30

## 2022-03-27 RX ADMIN — HYDROMORPHONE HYDROCHLORIDE 0.5 MILLIGRAM(S): 2 INJECTION INTRAMUSCULAR; INTRAVENOUS; SUBCUTANEOUS at 17:45

## 2022-03-27 RX ADMIN — HYDROMORPHONE HYDROCHLORIDE 0.5 MILLIGRAM(S): 2 INJECTION INTRAMUSCULAR; INTRAVENOUS; SUBCUTANEOUS at 13:35

## 2022-03-27 RX ADMIN — Medication 100 MILLIGRAM(S): at 21:14

## 2022-03-27 RX ADMIN — HYDROMORPHONE HYDROCHLORIDE 0.5 MILLIGRAM(S): 2 INJECTION INTRAMUSCULAR; INTRAVENOUS; SUBCUTANEOUS at 17:23

## 2022-03-27 RX ADMIN — Medication 650 MILLIGRAM(S): at 17:39

## 2022-03-27 RX ADMIN — Medication 650 MILLIGRAM(S): at 18:03

## 2022-03-27 RX ADMIN — HYDROMORPHONE HYDROCHLORIDE 1 MILLIGRAM(S): 2 INJECTION INTRAMUSCULAR; INTRAVENOUS; SUBCUTANEOUS at 06:00

## 2022-03-27 RX ADMIN — HYDROMORPHONE HYDROCHLORIDE 1 MILLIGRAM(S): 2 INJECTION INTRAMUSCULAR; INTRAVENOUS; SUBCUTANEOUS at 01:45

## 2022-03-27 RX ADMIN — OXYCODONE HYDROCHLORIDE 10 MILLIGRAM(S): 5 TABLET ORAL at 15:01

## 2022-03-27 RX ADMIN — Medication 650 MILLIGRAM(S): at 12:41

## 2022-03-27 RX ADMIN — HYDROMORPHONE HYDROCHLORIDE 0.5 MILLIGRAM(S): 2 INJECTION INTRAMUSCULAR; INTRAVENOUS; SUBCUTANEOUS at 00:10

## 2022-03-27 RX ADMIN — Medication 100 MILLIGRAM(S): at 13:36

## 2022-03-27 RX ADMIN — Medication 100 GRAM(S): at 07:14

## 2022-03-27 RX ADMIN — OXYCODONE HYDROCHLORIDE 10 MILLIGRAM(S): 5 TABLET ORAL at 21:14

## 2022-03-27 RX ADMIN — OXYCODONE HYDROCHLORIDE 10 MILLIGRAM(S): 5 TABLET ORAL at 08:25

## 2022-03-27 NOTE — OCCUPATIONAL THERAPY INITIAL EVALUATION ADULT - PHYSICAL ASSIST/NONPHYSICAL ASSIST:TOILET, OT EVAL
to urinate utilizing urinal in standing, required CGA for balance 2/2 abdominal pain/verbal cues/nonverbal cues (demo/gestures)/1 person assist

## 2022-03-27 NOTE — OCCUPATIONAL THERAPY INITIAL EVALUATION ADULT - ADDITIONAL COMMENTS
Pt lives with his family in an elevator access apartment with 0STE. Pt reports that prior to admission, pt was independent in all ADLs and IADLs, and ambulates with no device. Pt reports slight difficulty with donning/tying sneakers 2/2 R knee pain but is able to perform independently. Pt has a RW and cane from recent R knee replacement 9 months ago but reports he no longer uses. Pt is R hand dominant.

## 2022-03-27 NOTE — OCCUPATIONAL THERAPY INITIAL EVALUATION ADULT - MD ORDER
71M pmh psoriatic arthritis (previous Otezla), hepatitis C (cured 8 years ago), cirrhosis, tracheotomy at age 2 (decanulated long time ago), complete heartblock s/p pacemaker 2017, parox afib on eliquis presents for Elective Robot Assisted Cholecystectomy.

## 2022-03-27 NOTE — OCCUPATIONAL THERAPY INITIAL EVALUATION ADULT - GENERAL OBSERVATIONS, REHAB EVAL
Pt's RN Devorah cleared pt for therapy. Pt received semisupine in bed, +tele, +heplock, +ROSE drain, room air, NAD, agreeable to therapy. PT Angelina bautista.

## 2022-03-27 NOTE — OCCUPATIONAL THERAPY INITIAL EVALUATION ADULT - OCCUPATION
Pt currently works managing a boutique and reports that this requires extended time on his feet throughout the day.

## 2022-03-27 NOTE — OCCUPATIONAL THERAPY INITIAL EVALUATION ADULT - DIAGNOSIS, OT EVAL
Pt presents POD #1 laparoscopic cholecystectomy performed on 3/26/22. Pt presents with decreased balance, decreased activity tolerance, and abdominal pain radiating up to shoulder impacting his ability to independently complete ADLs, functional transfers, and functional mobility.

## 2022-03-27 NOTE — PROGRESS NOTE ADULT - ASSESSMENT
71M pmh psoriatic arthritis (previous Otezla), hepatitis C (cured 8 years ago), cirrhosis, tracheotomy at age 2 (decanulated long time ago), complete heartblock s/p pacemaker 2017, parox afib on eliquis, shoulder surgery, knee surgery 9 months ago (HSS) c/b inflammation requiring aspirations, mild sleep apnea, opioid use w/ high tolerance, recently presented to ED 3/6 w/cholelithiasis cholecystiti. Now s/p RA Lap cholecystectomy, requiring SICU admission post-operatively for further HD monitoring.     NEURO: Standing tylenol q6hr, oxy5/10 q6hr PRN. Breakthrough dilaudid, will discuss standing toradol.   CV: holding home eliquis.   PULM: nasal canula  GI/FEN: CLD, LR@100, will discuss IVHL + adv to low fat diet   : voids  ENDO: ISS  ID: ceftriaxone/flagyl (3/26--)   PPX: SCDs, SQH  LINES: PIVs,   WOUNDS/DRAINS: lap sites, ROSE drain x1

## 2022-03-27 NOTE — OCCUPATIONAL THERAPY INITIAL EVALUATION ADULT - MANUAL MUSCLE TESTING RESULTS, REHAB EVAL
LUE shoulder flexion/extension grossly 5/5, RUE shoulder flexion/extension grossly 3/5. BUE bicep flexion/extension grossly 4+/5. BUE  strength grossly 5/5. BLE grossly 4+/5 throughout.

## 2022-03-27 NOTE — OCCUPATIONAL THERAPY INITIAL EVALUATION ADULT - MODIFIED CLINICAL TEST OF SENSORY INTEGRATION IN BALANCE TEST
Pt ambulated grossly 50ft x 2 with BUE holding on to portable tele monitor with CGA x 1. Pt required increased time to perform as pt with slow movements and slightly kyphotic posture 2/2 abdominal pain.

## 2022-03-27 NOTE — OCCUPATIONAL THERAPY INITIAL EVALUATION ADULT - PHYSICAL ASSIST/NONPHYSICAL ASSIST: SUPINE/SIT, REHAB EVAL
with use of log roll technique to minimize abdominal pain, educated on technique/verbal cues/nonverbal cues (demo/gestures)/1 person assist

## 2022-03-27 NOTE — PHYSICAL THERAPY INITIAL EVALUATION ADULT - GENERAL OBSERVATIONS, REHAB EVAL
Pt. received in bed in no acute distress, +ROSE, SCD, heplock, EKG. Pt. presents with post-op pain and decreased fluidity of gait.

## 2022-03-27 NOTE — OCCUPATIONAL THERAPY INITIAL EVALUATION ADULT - PERTINENT HX OF CURRENT PROBLEM, REHAB EVAL
Pt presents s/p laparoscopic cholecystectomy performed on 3/26/22  requiring SICU admission post-operatively for further HD monitoring.

## 2022-03-27 NOTE — OCCUPATIONAL THERAPY INITIAL EVALUATION ADULT - PLANNED THERAPY INTERVENTIONS, OT EVAL
plan to follow up 1-2 more sessions while in house in order to review compensatory strategies to minimize abdominal pain/ADL retraining/IADL retraining/balance training/bed mobility training/strengthening/transfer training

## 2022-03-27 NOTE — PROGRESS NOTE ADULT - ASSESSMENT
71M pmh psoriatic arthritis (previous Otezla), hepatitis C (cured 8 years ago), cirrhosis, tracheotomy at age 2 (decanulated long time ago), complete heartblock s/p pacemaker 2017, parox afib on eliquis, shoulder surgery, knee surgery 9 months ago (HSS) c/b inflammation requiring aspirations, mild sleep apnea, opioid use w/ high tolerance, recently presented to ED 3/6 w/cholelithiasis cholecystitis, admitted today (3/26) for elective robotic cholecystectomy, intraop severely inflammed GB requiring difficult dissection. Admitted to SICU for HD monitoring.     NEURO: dilaudid PRN.   CV: holding home eliquis.   PULM: nasal canula  GI/FEN: CLD, LR@100  : TOV  ENDO: ISS  ID: ceftriaxone/flagyl (3/26--)   PPX: SCDs, SQH  LINES: PIVs,   WOUNDS/DRAINS: lap sites

## 2022-03-27 NOTE — PHYSICAL THERAPY INITIAL EVALUATION ADULT - LEVEL OF INDEPENDENCE: SUPINE/SIT, REHAB EVAL
contact guard Clofazimine Pregnancy And Lactation Text: This medication is Pregnancy Category C and isn't considered safe during pregnancy. It is excreted in breast milk.

## 2022-03-27 NOTE — OCCUPATIONAL THERAPY INITIAL EVALUATION ADULT - PHYSICAL ASSIST/NONPHYSICAL ASSIST:DRESS LOWER BODY, OT EVAL
to iesha/doff L sock seated EOB, educated on use of crosslegged technique to decrease abdominal pain/supervision/verbal cues/nonverbal cues (demo/gestures)

## 2022-03-27 NOTE — PROGRESS NOTE ADULT - ATTENDING COMMENTS
s/p open cholecystectomy with h/o HCV, PPM AF  physical as above  had coffey O/N but now voiding  try to minimize opiates  consider toradol and RTC tylenol  AC when cleared by surgery  tolerating diet

## 2022-03-28 LAB
ALBUMIN SERPL ELPH-MCNC: 2.8 G/DL — LOW (ref 3.3–5)
ALBUMIN SERPL ELPH-MCNC: 3.7 G/DL — SIGNIFICANT CHANGE UP (ref 3.3–5)
ALP SERPL-CCNC: 70 U/L — SIGNIFICANT CHANGE UP (ref 40–120)
ALP SERPL-CCNC: 84 U/L — SIGNIFICANT CHANGE UP (ref 40–120)
ALT FLD-CCNC: 26 U/L — SIGNIFICANT CHANGE UP (ref 10–45)
ALT FLD-CCNC: 27 U/L — SIGNIFICANT CHANGE UP (ref 10–45)
AMYLASE P1 CFR SERPL: 34 U/L — SIGNIFICANT CHANGE UP (ref 25–125)
ANION GAP SERPL CALC-SCNC: 10 MMOL/L — SIGNIFICANT CHANGE UP (ref 5–17)
ANION GAP SERPL CALC-SCNC: 9 MMOL/L — SIGNIFICANT CHANGE UP (ref 5–17)
APPEARANCE UR: CLEAR — SIGNIFICANT CHANGE UP
AST SERPL-CCNC: 21 U/L — SIGNIFICANT CHANGE UP (ref 10–40)
AST SERPL-CCNC: 24 U/L — SIGNIFICANT CHANGE UP (ref 10–40)
BASOPHILS # BLD AUTO: 0.01 K/UL — SIGNIFICANT CHANGE UP (ref 0–0.2)
BASOPHILS NFR BLD AUTO: 0.1 % — SIGNIFICANT CHANGE UP (ref 0–2)
BILIRUB SERPL-MCNC: 0.3 MG/DL — SIGNIFICANT CHANGE UP (ref 0.2–1.2)
BILIRUB SERPL-MCNC: 0.5 MG/DL — SIGNIFICANT CHANGE UP (ref 0.2–1.2)
BILIRUB UR-MCNC: NEGATIVE — SIGNIFICANT CHANGE UP
BLD GP AB SCN SERPL QL: NEGATIVE — SIGNIFICANT CHANGE UP
BUN SERPL-MCNC: 8 MG/DL — SIGNIFICANT CHANGE UP (ref 7–23)
BUN SERPL-MCNC: 8 MG/DL — SIGNIFICANT CHANGE UP (ref 7–23)
CALCIUM SERPL-MCNC: 8 MG/DL — LOW (ref 8.4–10.5)
CALCIUM SERPL-MCNC: 8.9 MG/DL — SIGNIFICANT CHANGE UP (ref 8.4–10.5)
CHLORIDE SERPL-SCNC: 100 MMOL/L — SIGNIFICANT CHANGE UP (ref 96–108)
CHLORIDE SERPL-SCNC: 99 MMOL/L — SIGNIFICANT CHANGE UP (ref 96–108)
CO2 SERPL-SCNC: 25 MMOL/L — SIGNIFICANT CHANGE UP (ref 22–31)
CO2 SERPL-SCNC: 27 MMOL/L — SIGNIFICANT CHANGE UP (ref 22–31)
COLOR SPEC: YELLOW — SIGNIFICANT CHANGE UP
CREAT SERPL-MCNC: 0.63 MG/DL — SIGNIFICANT CHANGE UP (ref 0.5–1.3)
CREAT SERPL-MCNC: 0.66 MG/DL — SIGNIFICANT CHANGE UP (ref 0.5–1.3)
DIFF PNL FLD: NEGATIVE — SIGNIFICANT CHANGE UP
EGFR: 100 ML/MIN/1.73M2 — SIGNIFICANT CHANGE UP
EGFR: 99 ML/MIN/1.73M2 — SIGNIFICANT CHANGE UP
EOSINOPHIL # BLD AUTO: 0.1 K/UL — SIGNIFICANT CHANGE UP (ref 0–0.5)
EOSINOPHIL NFR BLD AUTO: 1.1 % — SIGNIFICANT CHANGE UP (ref 0–6)
GLUCOSE BLDC GLUCOMTR-MCNC: 108 MG/DL — HIGH (ref 70–99)
GLUCOSE BLDC GLUCOMTR-MCNC: 112 MG/DL — HIGH (ref 70–99)
GLUCOSE BLDC GLUCOMTR-MCNC: 130 MG/DL — HIGH (ref 70–99)
GLUCOSE BLDC GLUCOMTR-MCNC: 90 MG/DL — SIGNIFICANT CHANGE UP (ref 70–99)
GLUCOSE SERPL-MCNC: 120 MG/DL — HIGH (ref 70–99)
GLUCOSE SERPL-MCNC: 93 MG/DL — SIGNIFICANT CHANGE UP (ref 70–99)
GLUCOSE UR QL: NEGATIVE — SIGNIFICANT CHANGE UP
HCT VFR BLD CALC: 29.3 % — LOW (ref 39–50)
HCT VFR BLD CALC: 34.4 % — LOW (ref 39–50)
HGB BLD-MCNC: 11.6 G/DL — LOW (ref 13–17)
HGB BLD-MCNC: 9.6 G/DL — LOW (ref 13–17)
IMM GRANULOCYTES NFR BLD AUTO: 0.5 % — SIGNIFICANT CHANGE UP (ref 0–1.5)
KETONES UR-MCNC: NEGATIVE — SIGNIFICANT CHANGE UP
LACTATE SERPL-SCNC: 0.9 MMOL/L — SIGNIFICANT CHANGE UP (ref 0.5–2)
LEUKOCYTE ESTERASE UR-ACNC: NEGATIVE — SIGNIFICANT CHANGE UP
LYMPHOCYTES # BLD AUTO: 0.58 K/UL — LOW (ref 1–3.3)
LYMPHOCYTES # BLD AUTO: 6.3 % — LOW (ref 13–44)
MAGNESIUM SERPL-MCNC: 1.8 MG/DL — SIGNIFICANT CHANGE UP (ref 1.6–2.6)
MAGNESIUM SERPL-MCNC: 1.9 MG/DL — SIGNIFICANT CHANGE UP (ref 1.6–2.6)
MCHC RBC-ENTMCNC: 30.4 PG — SIGNIFICANT CHANGE UP (ref 27–34)
MCHC RBC-ENTMCNC: 31 PG — SIGNIFICANT CHANGE UP (ref 27–34)
MCHC RBC-ENTMCNC: 32.8 GM/DL — SIGNIFICANT CHANGE UP (ref 32–36)
MCHC RBC-ENTMCNC: 33.7 GM/DL — SIGNIFICANT CHANGE UP (ref 32–36)
MCV RBC AUTO: 92 FL — SIGNIFICANT CHANGE UP (ref 80–100)
MCV RBC AUTO: 92.7 FL — SIGNIFICANT CHANGE UP (ref 80–100)
MONOCYTES # BLD AUTO: 0.88 K/UL — SIGNIFICANT CHANGE UP (ref 0–0.9)
MONOCYTES NFR BLD AUTO: 9.6 % — SIGNIFICANT CHANGE UP (ref 2–14)
NEUTROPHILS # BLD AUTO: 7.52 K/UL — HIGH (ref 1.8–7.4)
NEUTROPHILS NFR BLD AUTO: 82.4 % — HIGH (ref 43–77)
NITRITE UR-MCNC: NEGATIVE — SIGNIFICANT CHANGE UP
NRBC # BLD: 0 /100 WBCS — SIGNIFICANT CHANGE UP (ref 0–0)
NRBC # BLD: 0 /100 WBCS — SIGNIFICANT CHANGE UP (ref 0–0)
PH UR: 7.5 — SIGNIFICANT CHANGE UP (ref 5–8)
PHOSPHATE SERPL-MCNC: 2.1 MG/DL — LOW (ref 2.5–4.5)
PHOSPHATE SERPL-MCNC: 2.4 MG/DL — LOW (ref 2.5–4.5)
PLATELET # BLD AUTO: 135 K/UL — LOW (ref 150–400)
PLATELET # BLD AUTO: 176 K/UL — SIGNIFICANT CHANGE UP (ref 150–400)
POTASSIUM SERPL-MCNC: 3.8 MMOL/L — SIGNIFICANT CHANGE UP (ref 3.5–5.3)
POTASSIUM SERPL-MCNC: 4.2 MMOL/L — SIGNIFICANT CHANGE UP (ref 3.5–5.3)
POTASSIUM SERPL-SCNC: 3.8 MMOL/L — SIGNIFICANT CHANGE UP (ref 3.5–5.3)
POTASSIUM SERPL-SCNC: 4.2 MMOL/L — SIGNIFICANT CHANGE UP (ref 3.5–5.3)
PROT SERPL-MCNC: 5.7 G/DL — LOW (ref 6–8.3)
PROT SERPL-MCNC: 7.1 G/DL — SIGNIFICANT CHANGE UP (ref 6–8.3)
PROT UR-MCNC: NEGATIVE MG/DL — SIGNIFICANT CHANGE UP
RBC # BLD: 3.16 M/UL — LOW (ref 4.2–5.8)
RBC # BLD: 3.74 M/UL — LOW (ref 4.2–5.8)
RBC # FLD: 13.2 % — SIGNIFICANT CHANGE UP (ref 10.3–14.5)
RBC # FLD: 13.2 % — SIGNIFICANT CHANGE UP (ref 10.3–14.5)
RH IG SCN BLD-IMP: POSITIVE — SIGNIFICANT CHANGE UP
SODIUM SERPL-SCNC: 134 MMOL/L — LOW (ref 135–145)
SODIUM SERPL-SCNC: 136 MMOL/L — SIGNIFICANT CHANGE UP (ref 135–145)
SP GR SPEC: 1.02 — SIGNIFICANT CHANGE UP (ref 1–1.03)
UROBILINOGEN FLD QL: 0.2 E.U./DL — SIGNIFICANT CHANGE UP
WBC # BLD: 10.64 K/UL — HIGH (ref 3.8–10.5)
WBC # BLD: 9.14 K/UL — SIGNIFICANT CHANGE UP (ref 3.8–10.5)
WBC # FLD AUTO: 10.64 K/UL — HIGH (ref 3.8–10.5)
WBC # FLD AUTO: 9.14 K/UL — SIGNIFICANT CHANGE UP (ref 3.8–10.5)

## 2022-03-28 PROCEDURE — 71045 X-RAY EXAM CHEST 1 VIEW: CPT | Mod: 26

## 2022-03-28 RX ORDER — ACETAMINOPHEN 500 MG
1000 TABLET ORAL ONCE
Refills: 0 | Status: COMPLETED | OUTPATIENT
Start: 2022-03-28 | End: 2022-03-28

## 2022-03-28 RX ORDER — POTASSIUM CHLORIDE 20 MEQ
20 PACKET (EA) ORAL ONCE
Refills: 0 | Status: COMPLETED | OUTPATIENT
Start: 2022-03-28 | End: 2022-03-28

## 2022-03-28 RX ORDER — HYDROMORPHONE HYDROCHLORIDE 2 MG/ML
0.5 INJECTION INTRAMUSCULAR; INTRAVENOUS; SUBCUTANEOUS EVERY 4 HOURS
Refills: 0 | Status: DISCONTINUED | OUTPATIENT
Start: 2022-03-28 | End: 2022-03-29

## 2022-03-28 RX ORDER — MAGNESIUM SULFATE 500 MG/ML
1 VIAL (ML) INJECTION ONCE
Refills: 0 | Status: COMPLETED | OUTPATIENT
Start: 2022-03-28 | End: 2022-03-28

## 2022-03-28 RX ORDER — HYDROMORPHONE HYDROCHLORIDE 2 MG/ML
1 INJECTION INTRAMUSCULAR; INTRAVENOUS; SUBCUTANEOUS ONCE
Refills: 0 | Status: DISCONTINUED | OUTPATIENT
Start: 2022-03-28 | End: 2022-03-28

## 2022-03-28 RX ORDER — POLYETHYLENE GLYCOL 3350 17 G/17G
17 POWDER, FOR SOLUTION ORAL ONCE
Refills: 0 | Status: COMPLETED | OUTPATIENT
Start: 2022-03-28 | End: 2022-03-28

## 2022-03-28 RX ORDER — LIDOCAINE 4 G/100G
1 CREAM TOPICAL DAILY
Refills: 0 | Status: DISCONTINUED | OUTPATIENT
Start: 2022-03-28 | End: 2022-03-29

## 2022-03-28 RX ORDER — POTASSIUM PHOSPHATE, MONOBASIC POTASSIUM PHOSPHATE, DIBASIC 236; 224 MG/ML; MG/ML
15 INJECTION, SOLUTION INTRAVENOUS ONCE
Refills: 0 | Status: COMPLETED | OUTPATIENT
Start: 2022-03-28 | End: 2022-03-28

## 2022-03-28 RX ORDER — POTASSIUM PHOSPHATE, MONOBASIC POTASSIUM PHOSPHATE, DIBASIC 236; 224 MG/ML; MG/ML
15 INJECTION, SOLUTION INTRAVENOUS ONCE
Refills: 0 | Status: DISCONTINUED | OUTPATIENT
Start: 2022-03-28 | End: 2022-03-28

## 2022-03-28 RX ADMIN — HEPARIN SODIUM 5000 UNIT(S): 5000 INJECTION INTRAVENOUS; SUBCUTANEOUS at 21:08

## 2022-03-28 RX ADMIN — OXYCODONE HYDROCHLORIDE 10 MILLIGRAM(S): 5 TABLET ORAL at 10:23

## 2022-03-28 RX ADMIN — Medication 400 MILLIGRAM(S): at 21:45

## 2022-03-28 RX ADMIN — HYDROMORPHONE HYDROCHLORIDE 0.5 MILLIGRAM(S): 2 INJECTION INTRAMUSCULAR; INTRAVENOUS; SUBCUTANEOUS at 01:10

## 2022-03-28 RX ADMIN — Medication 1000 MILLIGRAM(S): at 22:00

## 2022-03-28 RX ADMIN — HEPARIN SODIUM 5000 UNIT(S): 5000 INJECTION INTRAVENOUS; SUBCUTANEOUS at 13:08

## 2022-03-28 RX ADMIN — Medication 650 MILLIGRAM(S): at 04:35

## 2022-03-28 RX ADMIN — HYDROMORPHONE HYDROCHLORIDE 0.5 MILLIGRAM(S): 2 INJECTION INTRAMUSCULAR; INTRAVENOUS; SUBCUTANEOUS at 15:00

## 2022-03-28 RX ADMIN — OXYCODONE HYDROCHLORIDE 10 MILLIGRAM(S): 5 TABLET ORAL at 18:25

## 2022-03-28 RX ADMIN — HYDROMORPHONE HYDROCHLORIDE 0.5 MILLIGRAM(S): 2 INJECTION INTRAMUSCULAR; INTRAVENOUS; SUBCUTANEOUS at 00:49

## 2022-03-28 RX ADMIN — Medication 100 MILLIGRAM(S): at 21:07

## 2022-03-28 RX ADMIN — HYDROMORPHONE HYDROCHLORIDE 1 MILLIGRAM(S): 2 INJECTION INTRAMUSCULAR; INTRAVENOUS; SUBCUTANEOUS at 21:31

## 2022-03-28 RX ADMIN — SODIUM CHLORIDE 100 MILLILITER(S): 9 INJECTION, SOLUTION INTRAVENOUS at 10:26

## 2022-03-28 RX ADMIN — Medication 100 MILLIGRAM(S): at 04:34

## 2022-03-28 RX ADMIN — POTASSIUM PHOSPHATE, MONOBASIC POTASSIUM PHOSPHATE, DIBASIC 62.5 MILLIMOLE(S): 236; 224 INJECTION, SOLUTION INTRAVENOUS at 10:36

## 2022-03-28 RX ADMIN — CEFTRIAXONE 100 MILLIGRAM(S): 500 INJECTION, POWDER, FOR SOLUTION INTRAMUSCULAR; INTRAVENOUS at 17:10

## 2022-03-28 RX ADMIN — HYDROMORPHONE HYDROCHLORIDE 0.5 MILLIGRAM(S): 2 INJECTION INTRAMUSCULAR; INTRAVENOUS; SUBCUTANEOUS at 08:33

## 2022-03-28 RX ADMIN — Medication 20 MILLIEQUIVALENT(S): at 09:39

## 2022-03-28 RX ADMIN — Medication 100 GRAM(S): at 09:36

## 2022-03-28 RX ADMIN — OXYCODONE HYDROCHLORIDE 10 MILLIGRAM(S): 5 TABLET ORAL at 10:59

## 2022-03-28 RX ADMIN — OXYCODONE HYDROCHLORIDE 10 MILLIGRAM(S): 5 TABLET ORAL at 17:35

## 2022-03-28 RX ADMIN — HYDROMORPHONE HYDROCHLORIDE 1 MILLIGRAM(S): 2 INJECTION INTRAMUSCULAR; INTRAVENOUS; SUBCUTANEOUS at 21:16

## 2022-03-28 RX ADMIN — HYDROMORPHONE HYDROCHLORIDE 0.5 MILLIGRAM(S): 2 INJECTION INTRAMUSCULAR; INTRAVENOUS; SUBCUTANEOUS at 22:47

## 2022-03-28 RX ADMIN — Medication 650 MILLIGRAM(S): at 11:00

## 2022-03-28 RX ADMIN — Medication 100 MILLIGRAM(S): at 13:07

## 2022-03-28 RX ADMIN — Medication 650 MILLIGRAM(S): at 00:23

## 2022-03-28 RX ADMIN — HYDROMORPHONE HYDROCHLORIDE 0.5 MILLIGRAM(S): 2 INJECTION INTRAMUSCULAR; INTRAVENOUS; SUBCUTANEOUS at 08:50

## 2022-03-28 RX ADMIN — Medication 650 MILLIGRAM(S): at 12:00

## 2022-03-28 RX ADMIN — LIDOCAINE 1 PATCH: 4 CREAM TOPICAL at 22:47

## 2022-03-28 RX ADMIN — HYDROMORPHONE HYDROCHLORIDE 0.5 MILLIGRAM(S): 2 INJECTION INTRAMUSCULAR; INTRAVENOUS; SUBCUTANEOUS at 15:25

## 2022-03-28 RX ADMIN — HEPARIN SODIUM 5000 UNIT(S): 5000 INJECTION INTRAVENOUS; SUBCUTANEOUS at 04:38

## 2022-03-28 RX ADMIN — OXYCODONE HYDROCHLORIDE 10 MILLIGRAM(S): 5 TABLET ORAL at 05:40

## 2022-03-28 RX ADMIN — Medication 650 MILLIGRAM(S): at 05:30

## 2022-03-28 RX ADMIN — OXYCODONE HYDROCHLORIDE 10 MILLIGRAM(S): 5 TABLET ORAL at 04:35

## 2022-03-28 NOTE — PROGRESS NOTE ADULT - ASSESSMENT
71M pmh psoriatic arthritis (previous Otezla), hepatitis C (cured 8 years ago), cirrhosis, tracheotomy at age 2 (decanulated long time ago), complete heartblock s/p pacemaker 2017, parox afib on eliquis, shoulder surgery, knee surgery 9 months ago (HSS) c/b inflammation requiring aspirations, mild sleep apnea, opioid use w/ high tolerance, recently presented to ED 3/6 w/cholelithiasis cholecystitis, admitted today (3/26) for elective robotic cholecystectomy, intraop severely inflammed GB requiring difficult dissection. Admitted to SICU for HD monitoring, doing well - stepped down to regional.     Low fat/IVF  Pain/Nausea PRN  Ceft/flagyl  OOBA/IS  HSQ/SCD  Start eliquis today  ROSE x1  Bowel regimen  AM labs     71M pmh psoriatic arthritis (previous Otezla), hepatitis C (cured 8 years ago), cirrhosis, tracheotomy at age 2 (decanulated long time ago), complete heartblock s/p pacemaker 2017, parox afib on eliquis, shoulder surgery, knee surgery 9 months ago (HSS) c/b inflammation requiring aspirations, mild sleep apnea, opioid use w/ high tolerance, recently presented to ED 3/6 w/cholelithiasis cholecystitis, admitted today (3/26) for elective robotic cholecystectomy, intraop severely inflammed GB requiring difficult dissection. Admitted to SICU for HD monitoring, doing well - stepped down to regional.     Low fat/IVF  Pain/Nausea PRN  Ceft/flagyl  OOBA/IS  HSQ/SCD  Start eliquis tomorrow  ROSE x1  Bowel regimen  AM labs

## 2022-03-29 ENCOUNTER — TRANSCRIPTION ENCOUNTER (OUTPATIENT)
Age: 72
End: 2022-03-29

## 2022-03-29 VITALS — TEMPERATURE: 98 F

## 2022-03-29 LAB
ALBUMIN SERPL ELPH-MCNC: 3.1 G/DL — LOW (ref 3.3–5)
ALP SERPL-CCNC: 75 U/L — SIGNIFICANT CHANGE UP (ref 40–120)
ALT FLD-CCNC: 22 U/L — SIGNIFICANT CHANGE UP (ref 10–45)
ANION GAP SERPL CALC-SCNC: 11 MMOL/L — SIGNIFICANT CHANGE UP (ref 5–17)
APTT BLD: 32.4 SEC — SIGNIFICANT CHANGE UP (ref 27.5–35.5)
AST SERPL-CCNC: 18 U/L — SIGNIFICANT CHANGE UP (ref 10–40)
BASOPHILS # BLD AUTO: 0.02 K/UL — SIGNIFICANT CHANGE UP (ref 0–0.2)
BASOPHILS NFR BLD AUTO: 0.2 % — SIGNIFICANT CHANGE UP (ref 0–2)
BILIRUB SERPL-MCNC: 0.3 MG/DL — SIGNIFICANT CHANGE UP (ref 0.2–1.2)
BLD GP AB SCN SERPL QL: NEGATIVE — SIGNIFICANT CHANGE UP
BUN SERPL-MCNC: 7 MG/DL — SIGNIFICANT CHANGE UP (ref 7–23)
CALCIUM SERPL-MCNC: 8.5 MG/DL — SIGNIFICANT CHANGE UP (ref 8.4–10.5)
CHLORIDE SERPL-SCNC: 101 MMOL/L — SIGNIFICANT CHANGE UP (ref 96–108)
CO2 SERPL-SCNC: 25 MMOL/L — SIGNIFICANT CHANGE UP (ref 22–31)
CREAT SERPL-MCNC: 0.58 MG/DL — SIGNIFICANT CHANGE UP (ref 0.5–1.3)
EGFR: 103 ML/MIN/1.73M2 — SIGNIFICANT CHANGE UP
EOSINOPHIL # BLD AUTO: 0.19 K/UL — SIGNIFICANT CHANGE UP (ref 0–0.5)
EOSINOPHIL NFR BLD AUTO: 2.3 % — SIGNIFICANT CHANGE UP (ref 0–6)
FIBRINOGEN PPP-MCNC: 497 MG/DL — HIGH (ref 258–438)
GLUCOSE BLDC GLUCOMTR-MCNC: 140 MG/DL — HIGH (ref 70–99)
GLUCOSE BLDC GLUCOMTR-MCNC: 94 MG/DL — SIGNIFICANT CHANGE UP (ref 70–99)
GLUCOSE SERPL-MCNC: 99 MG/DL — SIGNIFICANT CHANGE UP (ref 70–99)
HCT VFR BLD CALC: 32 % — LOW (ref 39–50)
HGB BLD-MCNC: 10.5 G/DL — LOW (ref 13–17)
IMM GRANULOCYTES NFR BLD AUTO: 0.5 % — SIGNIFICANT CHANGE UP (ref 0–1.5)
INR BLD: 1.53 — HIGH (ref 0.88–1.16)
LYMPHOCYTES # BLD AUTO: 0.6 K/UL — LOW (ref 1–3.3)
LYMPHOCYTES # BLD AUTO: 7.2 % — LOW (ref 13–44)
MAGNESIUM SERPL-MCNC: 1.8 MG/DL — SIGNIFICANT CHANGE UP (ref 1.6–2.6)
MCHC RBC-ENTMCNC: 30.8 PG — SIGNIFICANT CHANGE UP (ref 27–34)
MCHC RBC-ENTMCNC: 32.8 GM/DL — SIGNIFICANT CHANGE UP (ref 32–36)
MCV RBC AUTO: 93.8 FL — SIGNIFICANT CHANGE UP (ref 80–100)
MONOCYTES # BLD AUTO: 0.79 K/UL — SIGNIFICANT CHANGE UP (ref 0–0.9)
MONOCYTES NFR BLD AUTO: 9.5 % — SIGNIFICANT CHANGE UP (ref 2–14)
NEUTROPHILS # BLD AUTO: 6.7 K/UL — SIGNIFICANT CHANGE UP (ref 1.8–7.4)
NEUTROPHILS NFR BLD AUTO: 80.3 % — HIGH (ref 43–77)
NRBC # BLD: 0 /100 WBCS — SIGNIFICANT CHANGE UP (ref 0–0)
PHOSPHATE SERPL-MCNC: 2.7 MG/DL — SIGNIFICANT CHANGE UP (ref 2.5–4.5)
PLATELET # BLD AUTO: 160 K/UL — SIGNIFICANT CHANGE UP (ref 150–400)
POTASSIUM SERPL-MCNC: 3.9 MMOL/L — SIGNIFICANT CHANGE UP (ref 3.5–5.3)
POTASSIUM SERPL-SCNC: 3.9 MMOL/L — SIGNIFICANT CHANGE UP (ref 3.5–5.3)
PROT SERPL-MCNC: 6.4 G/DL — SIGNIFICANT CHANGE UP (ref 6–8.3)
PROTHROM AB SERPL-ACNC: 18.3 SEC — HIGH (ref 10.5–13.4)
RBC # BLD: 3.41 M/UL — LOW (ref 4.2–5.8)
RBC # FLD: 13.3 % — SIGNIFICANT CHANGE UP (ref 10.3–14.5)
RH IG SCN BLD-IMP: POSITIVE — SIGNIFICANT CHANGE UP
SODIUM SERPL-SCNC: 137 MMOL/L — SIGNIFICANT CHANGE UP (ref 135–145)
WBC # BLD: 8.34 K/UL — SIGNIFICANT CHANGE UP (ref 3.8–10.5)
WBC # FLD AUTO: 8.34 K/UL — SIGNIFICANT CHANGE UP (ref 3.8–10.5)

## 2022-03-29 PROCEDURE — 81003 URINALYSIS AUTO W/O SCOPE: CPT

## 2022-03-29 PROCEDURE — 82962 GLUCOSE BLOOD TEST: CPT

## 2022-03-29 PROCEDURE — 97162 PT EVAL MOD COMPLEX 30 MIN: CPT

## 2022-03-29 PROCEDURE — 85384 FIBRINOGEN ACTIVITY: CPT

## 2022-03-29 PROCEDURE — 86803 HEPATITIS C AB TEST: CPT

## 2022-03-29 PROCEDURE — 84100 ASSAY OF PHOSPHORUS: CPT

## 2022-03-29 PROCEDURE — 83605 ASSAY OF LACTIC ACID: CPT

## 2022-03-29 PROCEDURE — 87521 HEPATITIS C PROBE&RVRS TRNSC: CPT

## 2022-03-29 PROCEDURE — 86923 COMPATIBILITY TEST ELECTRIC: CPT

## 2022-03-29 PROCEDURE — 85730 THROMBOPLASTIN TIME PARTIAL: CPT

## 2022-03-29 PROCEDURE — 88304 TISSUE EXAM BY PATHOLOGIST: CPT

## 2022-03-29 PROCEDURE — 86850 RBC ANTIBODY SCREEN: CPT

## 2022-03-29 PROCEDURE — 85025 COMPLETE CBC W/AUTO DIFF WBC: CPT

## 2022-03-29 PROCEDURE — 82150 ASSAY OF AMYLASE: CPT

## 2022-03-29 PROCEDURE — 80053 COMPREHEN METABOLIC PANEL: CPT

## 2022-03-29 PROCEDURE — 71045 X-RAY EXAM CHEST 1 VIEW: CPT

## 2022-03-29 PROCEDURE — 97535 SELF CARE MNGMENT TRAINING: CPT

## 2022-03-29 PROCEDURE — 97116 GAIT TRAINING THERAPY: CPT

## 2022-03-29 PROCEDURE — 86900 BLOOD TYPING SEROLOGIC ABO: CPT

## 2022-03-29 PROCEDURE — 87040 BLOOD CULTURE FOR BACTERIA: CPT

## 2022-03-29 PROCEDURE — 86901 BLOOD TYPING SEROLOGIC RH(D): CPT

## 2022-03-29 PROCEDURE — 97161 PT EVAL LOW COMPLEX 20 MIN: CPT

## 2022-03-29 PROCEDURE — 36415 COLL VENOUS BLD VENIPUNCTURE: CPT

## 2022-03-29 PROCEDURE — 85610 PROTHROMBIN TIME: CPT

## 2022-03-29 PROCEDURE — 83735 ASSAY OF MAGNESIUM: CPT

## 2022-03-29 PROCEDURE — 85027 COMPLETE CBC AUTOMATED: CPT

## 2022-03-29 RX ORDER — MAGNESIUM SULFATE 500 MG/ML
1 VIAL (ML) INJECTION ONCE
Refills: 0 | Status: COMPLETED | OUTPATIENT
Start: 2022-03-29 | End: 2022-03-29

## 2022-03-29 RX ORDER — CEFPODOXIME PROXETIL 100 MG
1 TABLET ORAL
Qty: 6 | Refills: 0
Start: 2022-03-29 | End: 2022-03-31

## 2022-03-29 RX ORDER — OXYCODONE HYDROCHLORIDE 5 MG/1
1 TABLET ORAL
Qty: 16 | Refills: 0
Start: 2022-03-29 | End: 2022-04-01

## 2022-03-29 RX ORDER — METRONIDAZOLE 500 MG
1 TABLET ORAL
Qty: 9 | Refills: 0
Start: 2022-03-29 | End: 2022-03-31

## 2022-03-29 RX ADMIN — HEPARIN SODIUM 5000 UNIT(S): 5000 INJECTION INTRAVENOUS; SUBCUTANEOUS at 11:50

## 2022-03-29 RX ADMIN — LIDOCAINE 1 PATCH: 4 CREAM TOPICAL at 07:22

## 2022-03-29 RX ADMIN — Medication 100 MILLIGRAM(S): at 05:46

## 2022-03-29 RX ADMIN — OXYCODONE HYDROCHLORIDE 10 MILLIGRAM(S): 5 TABLET ORAL at 07:22

## 2022-03-29 RX ADMIN — OXYCODONE HYDROCHLORIDE 10 MILLIGRAM(S): 5 TABLET ORAL at 12:16

## 2022-03-29 RX ADMIN — HYDROMORPHONE HYDROCHLORIDE 0.5 MILLIGRAM(S): 2 INJECTION INTRAMUSCULAR; INTRAVENOUS; SUBCUTANEOUS at 03:05

## 2022-03-29 RX ADMIN — Medication 650 MILLIGRAM(S): at 12:06

## 2022-03-29 RX ADMIN — OXYCODONE HYDROCHLORIDE 10 MILLIGRAM(S): 5 TABLET ORAL at 01:13

## 2022-03-29 RX ADMIN — OXYCODONE HYDROCHLORIDE 10 MILLIGRAM(S): 5 TABLET ORAL at 00:26

## 2022-03-29 RX ADMIN — Medication 650 MILLIGRAM(S): at 11:49

## 2022-03-29 RX ADMIN — Medication 100 MILLIGRAM(S): at 12:56

## 2022-03-29 RX ADMIN — Medication 650 MILLIGRAM(S): at 06:07

## 2022-03-29 RX ADMIN — CHLORHEXIDINE GLUCONATE 1 APPLICATION(S): 213 SOLUTION TOPICAL at 07:22

## 2022-03-29 RX ADMIN — Medication 650 MILLIGRAM(S): at 05:46

## 2022-03-29 RX ADMIN — OXYCODONE HYDROCHLORIDE 10 MILLIGRAM(S): 5 TABLET ORAL at 06:31

## 2022-03-29 RX ADMIN — HYDROMORPHONE HYDROCHLORIDE 0.5 MILLIGRAM(S): 2 INJECTION INTRAMUSCULAR; INTRAVENOUS; SUBCUTANEOUS at 04:08

## 2022-03-29 RX ADMIN — OXYCODONE HYDROCHLORIDE 10 MILLIGRAM(S): 5 TABLET ORAL at 12:56

## 2022-03-29 RX ADMIN — HEPARIN SODIUM 5000 UNIT(S): 5000 INJECTION INTRAVENOUS; SUBCUTANEOUS at 05:46

## 2022-03-29 RX ADMIN — CHLORHEXIDINE GLUCONATE 1 APPLICATION(S): 213 SOLUTION TOPICAL at 07:21

## 2022-03-29 RX ADMIN — HYDROMORPHONE HYDROCHLORIDE 0.5 MILLIGRAM(S): 2 INJECTION INTRAMUSCULAR; INTRAVENOUS; SUBCUTANEOUS at 00:23

## 2022-03-29 RX ADMIN — Medication 100 GRAM(S): at 11:48

## 2022-03-29 NOTE — DISCHARGE NOTE PROVIDER - NSDCCPCAREPLAN_GEN_ALL_CORE_FT
PRINCIPAL DISCHARGE DIAGNOSIS  Diagnosis: Morbid obesity  Assessment and Plan of Treatment: 71 year old male with past medical history of psoriatic arthritis (previous Otezla), hepatitis C (cured 8 years ago), cirrhosis, tracheotomy at age 2 (decanulated long time ago), complete heartblock s/p pacemaker 2017, paroxysmal afib on Eliquis, shoulder surgery, knee surgery 9 months ago (HSS) c/b inflammation requiring aspirations, mild sleep apnea, opioid use w/ high tolerance, recently presented to ED 3/6 w/cholelithiasis cholecystitis, admitted today (3/26) for elective robotic cholecystectomy, intraop severely inflammed GB requiring difficult dissection. Admitted to SICU for HD monitoring, doing well - stepped down to regional.  At time of discharge, pt was tolerating a low fat diet, and pt's pain was controlled. Plan is to follow up with Dr. Pizano in the office.  -Continue a Low fat diet   -Activity: no heavy lifting or strenuous exercise for one month.  -You may shower but no soaking baths, no swimming pools.  -Notify physician for fever greater than 101, worsening abdominal pain, bleeding or drainage from incision sites.   - Please restart your regular dose of ELIQUIS TONIGHT 3/29/2022.  - Please take cefpodoxime by mouth until 3/31/2022.  - Please take Flagyl 500 mg every 8 hours by mouth until 3/31/2022.  -Follow-up with Dr. Pizano in 1 week. Call the office to make an appointment.       PRINCIPAL DISCHARGE DIAGNOSIS  Diagnosis: Morbid obesity  Assessment and Plan of Treatment: 71 year old male with past medical history of psoriatic arthritis (previous Otezla), hepatitis C (cured 8 years ago), cirrhosis, tracheotomy at age 2 (decanulated long time ago), complete heartblock s/p pacemaker 2017, paroxysmal afib on Eliquis, shoulder surgery, knee surgery 9 months ago (HSS) c/b inflammation requiring aspirations, mild sleep apnea, opioid use w/ high tolerance, recently presented to ED 3/6 w/cholelithiasis cholecystitis, admitted today (3/26) for elective robotic cholecystectomy, intraop severely inflammed GB requiring difficult dissection. Admitted to SICU for HD monitoring, doing well - stepped down to regional.  At time of discharge, pt was tolerating a low fat diet, and pt's pain was controlled. Plan is to follow up with Dr. Pizano in the office.  -Continue a Low fat diet   -Activity: no heavy lifting or strenuous exercise for one month.  -You may shower but no soaking baths, no swimming pools.  -Notify physician for fever greater than 101, worsening abdominal pain, bleeding or drainage from incision sites.   - Please restart your regular dose of ELIQUIS TONIGHT 3/29/2022.  - Please take cefpodoxime by mouth until 3/31/2022.  - Please take Flagyl 500 mg every 8 hours by mouth until 3/31/2022.  - PLEASE DO NOT TAKE PERCOCET WITH PRESCRIBED OXYCODONE. TAKE EITHER OR  6 HOURS A PART.  -Follow-up with Dr. Pizano in 1 week. Call the office to make an appointment.

## 2022-03-29 NOTE — DISCHARGE NOTE PROVIDER - HOSPITAL COURSE
71 year old male with past medical history of psoriatic arthritis (previous Otezla), hepatitis C (cured 8 years ago), cirrhosis, tracheotomy at age 2 (decanulated long time ago), complete heartblock s/p pacemaker 2017, paroxysmal afib on Eliquis, shoulder surgery, knee surgery 9 months ago (HSS) c/b inflammation requiring aspirations, mild sleep apnea, opioid use w/ high tolerance, recently presented to ED 3/6 w/cholelithiasis cholecystitis, admitted today (3/26) for elective robotic cholecystectomy, intraop severely inflammed GB requiring difficult dissection. Admitted to SICU for HD monitoring, doing well - stepped down to regional.  Patients ROSE drain removed. At time of discharge, pt was tolerating a low fat diet, and pt's pain was controlled. Plan is to follow up with Dr. Pizano in the office.

## 2022-03-29 NOTE — DISCHARGE NOTE PROVIDER - NSDCFUADDINST_GEN_ALL_CORE_FT
-Continue a Low fat diet   -Activity: no heavy lifting or strenuous exercise for one month.  -You may shower but no soaking baths, no swimming pools.  -Notify physician for fever greater than 101, worsening abdominal pain, bleeding or drainage from incision sites.   - Please restart your regular dose of ELIQUIS TONIGHT 3/29/2022.  - Please take cefpodoxime by mouth until 3/31/2022.  - Please take Flagyl 500 mg every 8 hours by mouth until 3/31/2022.  -Follow-up with Dr. Pizano in 1 week. Call the office to make an appointment.  -Continue a Low fat diet  -Activity: no heavy lifting or strenuous exercise for one month. -You may shower but no soaking baths, no swimming pools. -Notify physician for fever greater than 101, worsening abdominal pain, bleeding or drainage from incision sites.  - Please restart your regular dose of ELIQUIS TONIGHT 3/29/2022. - Please take cefpodoxime by mouth until 3/31/2022. - Please take Flagyl 500 mg every 8 hours by mouth until 3/31/2022. - PLEASE DO NOT TAKE PERCOCET WITH PRESCRIBED OXYCODONE. TAKE EITHER OR  6 HOURS A PART. -Follow-up with Dr. Pizano in 1 week. Call the office to make an appointment.

## 2022-03-29 NOTE — DISCHARGE NOTE NURSING/CASE MANAGEMENT/SOCIAL WORK - NSDCPEFALRISK_GEN_ALL_CORE
For information on Fall & Injury Prevention, visit: [email or web address] OR  [email or web address] OR  https://www.cdc.gov/steadi/patient.html

## 2022-03-29 NOTE — DISCHARGE NOTE PROVIDER - NSDCMRMEDTOKEN_GEN_ALL_CORE_FT
Eliquis 5 mg oral tablet: 1 tab(s) orally 2 times a day  metroNIDAZOLE 500 mg oral tablet: 1 tab(s) orally 3 times a day MDD:3 tablets  oxyCODONE 5 mg oral tablet: 1 tab(s) orally every 6 hours as needed for severe pain; MDD: 4 tabs  Percocet 10/325 oral tablet: 1 tab(s) orally every 6 hours  rosuvastatin 5 mg oral tablet: 1 tab(s) orally once a day   cefpodoxime 200 mg oral tablet: 1 tab(s) orally every 12 hours MDD:2 tablets  Eliquis 5 mg oral tablet: 1 tab(s) orally 2 times a day  metroNIDAZOLE 500 mg oral tablet: 1 tab(s) orally 3 times a day MDD:3 tablets  oxyCODONE 5 mg oral tablet: 1 tab(s) orally every 6 hours as needed for severe pain; MDD: 4 tabs  Percocet 10/325 oral tablet: 1 tab(s) orally every 6 hours  rosuvastatin 5 mg oral tablet: 1 tab(s) orally once a day

## 2022-03-29 NOTE — DISCHARGE NOTE NURSING/CASE MANAGEMENT/SOCIAL WORK - PATIENT PORTAL LINK FT
You can access the FollowMyHealth Patient Portal offered by [facility] by registering at the following website: [email or web address]. By joining [employer]’s FollowMyHealth portal, you will also be able to view your health information using other applications (apps) compatible with our system.

## 2022-03-29 NOTE — DISCHARGE NOTE PROVIDER - CARE PROVIDER_API CALL
Giovanny Pizano (MD)  Surgery  [address] [address]  Phone: [phone]  Fax: [phone]  Follow Up Time: 1 week

## 2022-03-29 NOTE — PROGRESS NOTE ADULT - SUBJECTIVE AND OBJECTIVE BOX
STATUS POST:    3/26: robotic converted to lap cholecystectomy, , 2500 crystalloids,      24 hours:  O/N: not tolerating LFD, tolerating CLD, +N/-V, -F/-BM, encouraged ooba  3/27: PO pain reg started. transfer to regional, adv to reg low fat diet, hep c reactive    SUBJECTIVE: Pt seen and examined at bedside this am by surgery team. Reports pain in the area around ROSE drain. He's been in a lot of abdominal pain when walking. Shoulder pain has improved.     MEDICATIONS  (STANDING):  acetaminophen     Tablet .. 650 milliGRAM(s) Oral every 6 hours  BUpivacaine liposome 1.3% Injectable (no eMAR) 20 milliLiter(s) Local Injection once  cefTRIAXone   IVPB 2000 milliGRAM(s) IV Intermittent every 24 hours  chlorhexidine 2% Cloths 1 Application(s) Topical <User Schedule>  chlorhexidine 4% Liquid 1 Application(s) Topical <User Schedule>  dextrose 5%. 1000 milliLiter(s) (50 mL/Hr) IV Continuous <Continuous>  dextrose 5%. 1000 milliLiter(s) (100 mL/Hr) IV Continuous <Continuous>  dextrose 50% Injectable 25 Gram(s) IV Push once  dextrose 50% Injectable 12.5 Gram(s) IV Push once  dextrose 50% Injectable 25 Gram(s) IV Push once  glucagon  Injectable 1 milliGRAM(s) IntraMuscular once  heparin   Injectable 5000 Unit(s) SubCutaneous every 8 hours  insulin lispro (ADMELOG) corrective regimen sliding scale   SubCutaneous every 6 hours  lactated ringers. 1000 milliLiter(s) (100 mL/Hr) IV Continuous <Continuous>  metroNIDAZOLE  IVPB 500 milliGRAM(s) IV Intermittent every 8 hours    MEDICATIONS  (PRN):  dextrose Oral Gel 15 Gram(s) Oral once PRN Blood Glucose LESS THAN 70 milliGRAM(s)/deciliter  HYDROmorphone  Injectable 0.5 milliGRAM(s) IV Push every 6 hours PRN Severe Pain (7 - 10)  oxyCODONE    IR 5 milliGRAM(s) Oral every 6 hours PRN Mild Pain (1 - 3)  oxyCODONE    IR 10 milliGRAM(s) Oral every 6 hours PRN Moderate Pain (4 - 6)      Vital Signs Last 24 Hrs  T(C): 36.8 (28 Mar 2022 04:42), Max: 36.9 (27 Mar 2022 09:23)  T(F): 98.3 (28 Mar 2022 04:42), Max: 98.5 (27 Mar 2022 09:23)  HR: 88 (28 Mar 2022 04:42) (80 - 97)  BP: 138/65 (28 Mar 2022 04:42) (126/60 - 157/76)  BP(mean): 91 (27 Mar 2022 11:01) (85 - 105)  RR: 18 (28 Mar 2022 04:42) (16 - 18)  SpO2: 96% (28 Mar 2022 04:42) (93% - 96%)    Physical Exam  General: NAD, resting comfortably in bed  Pulmonary: Nonlabored breathing, no respiratory distress  CV: NSR  Abd: soft, tender RLQ around ROSE, ND, no guarding, incisions c/d/i, ROSE serosang  Extremities: (-) edema, warm, well-perfused      I&O's Detail    26 Mar 2022 07:01  -  27 Mar 2022 07:00  --------------------------------------------------------  IN:    IV PiggyBack: 600 mL    Lactated Ringers: 1200 mL    Oral Fluid: 200 mL  Total IN: 2000 mL    OUT:    Bulb (mL): 90 mL    Voided (mL): 600 mL  Total OUT: 690 mL    Total NET: 1310 mL      27 Mar 2022 07:01  -  28 Mar 2022 06:55  --------------------------------------------------------  IN:    IV PiggyBack: 250 mL    Lactated Ringers: 1900 mL    Oral Fluid: 650 mL  Total IN: 2800 mL    OUT:    Bulb (mL): 100 mL    Voided (mL): 1250 mL  Total OUT: 1350 mL    Total NET: 1450 mL          LABS:                        10.4   6.55  )-----------( 158      ( 27 Mar 2022 05:41 )             31.8     03-27    138  |  103  |  9   ----------------------------<  113<H>  4.4   |  27  |  0.65    Ca    8.5      27 Mar 2022 05:41  Phos  3.5     03-27  Mg     1.9     03-27    TPro  6.0  /  Alb  3.0<L>  /  TBili  0.4  /  DBili  x   /  AST  36  /  ALT  40  /  AlkPhos  78  03-27    PT/INR - ( 26 Mar 2022 18:39 )   PT: 15.2 sec;   INR: 1.27          PTT - ( 26 Mar 2022 18:39 )  PTT:27.4 sec      RADIOLOGY & ADDITIONAL STUDIES:
ON: Passed TOV.      SUBJECTIVE: Pt seen and examined by SICU team + attending, voiding, denies N/V/CP/SOB. Endorses persistent abd pain, transiently relieved w Dilaudid, also complaining of R shoulder pain w both AROM/PROM.      MEDICATIONS  (STANDING):  acetaminophen     Tablet .. 650 milliGRAM(s) Oral every 6 hours  BUpivacaine liposome 1.3% Injectable (no eMAR) 20 milliLiter(s) Local Injection once  cefTRIAXone   IVPB 2000 milliGRAM(s) IV Intermittent every 24 hours  chlorhexidine 2% Cloths 1 Application(s) Topical <User Schedule>  chlorhexidine 4% Liquid 1 Application(s) Topical <User Schedule>  dextrose 5%. 1000 milliLiter(s) (50 mL/Hr) IV Continuous <Continuous>  dextrose 5%. 1000 milliLiter(s) (100 mL/Hr) IV Continuous <Continuous>  dextrose 50% Injectable 25 Gram(s) IV Push once  dextrose 50% Injectable 12.5 Gram(s) IV Push once  dextrose 50% Injectable 25 Gram(s) IV Push once  glucagon  Injectable 1 milliGRAM(s) IntraMuscular once  heparin   Injectable 5000 Unit(s) SubCutaneous every 8 hours  insulin lispro (ADMELOG) corrective regimen sliding scale   SubCutaneous every 6 hours  lactated ringers. 1000 milliLiter(s) (100 mL/Hr) IV Continuous <Continuous>  metroNIDAZOLE  IVPB 500 milliGRAM(s) IV Intermittent every 8 hours    MEDICATIONS  (PRN):  dextrose Oral Gel 15 Gram(s) Oral once PRN Blood Glucose LESS THAN 70 milliGRAM(s)/deciliter  HYDROmorphone  Injectable 0.5 milliGRAM(s) IV Push every 6 hours PRN Severe Pain (7 - 10)  oxyCODONE    IR 5 milliGRAM(s) Oral every 6 hours PRN Mild Pain (1 - 3)  oxyCODONE    IR 10 milliGRAM(s) Oral every 6 hours PRN Moderate Pain (4 - 6)      Drips: N/A    ICU Vital Signs Last 24 Hrs  T(C): 36.5 (27 Mar 2022 05:27), Max: 36.9 (26 Mar 2022 18:20)  T(F): 97.7 (27 Mar 2022 05:27), Max: 98.5 (26 Mar 2022 18:20)  HR: 88 (27 Mar 2022 07:00) (68 - 992)  BP: 126/60 (27 Mar 2022 07:00) (105/55 - 183/77)  BP(mean): 85 (27 Mar 2022 07:00) (78 - 122)  ABP: --  ABP(mean): --  RR: 17 (27 Mar 2022 07:00) (17 - 24)  SpO2: 93% (27 Mar 2022 07:00) (93% - 100%)      Physical Exam:  General: NAD  HEENT: NC/AT, EOMI, PERRLA, normal hearing, no oral lesions, neck supple w/o LAD  Pulmonary: Nonlabored breathing, no respiratory distress, CTAB  Cardiovascular: Paced rhythm   Abdominal: soft, ND, incisions c/d/i, ROSE x1 w SS output  Extremities: WWP, 5/5 strength x 4, no clubbing/cyanosis/edema. R shoulder pain w PROM/AROM   Neuro: A/O x3, CNs II-XII grossly intact, normal motor/sensation, no focal deficits  Pulses: palpable distal pulses    Lines/tubes/drains:  - PIV   - ROSE drain x1    I&O's Summary    26 Mar 2022 07:01  -  27 Mar 2022 07:00  --------------------------------------------------------  IN: 2000 mL / OUT: 690 mL / NET: 1310 mL    27 Mar 2022 07:01  -  27 Mar 2022 08:03  --------------------------------------------------------  IN: 150 mL / OUT: 0 mL / NET: 150 mL        LABS:                        10.4   6.55  )-----------( 158      ( 27 Mar 2022 05:41 )             31.8     03-27    138  |  103  |  9   ----------------------------<  113<H>  4.4   |  27  |  0.65    Ca    8.5      27 Mar 2022 05:41  Phos  3.5     03-27  Mg     1.9     03-27    TPro  6.0  /  Alb  3.0<L>  /  TBili  0.4  /  DBili  x   /  AST  36  /  ALT  40  /  AlkPhos  78  03-27    PT/INR - ( 26 Mar 2022 18:39 )   PT: 15.2 sec;   INR: 1.27          PTT - ( 26 Mar 2022 18:39 )  PTT:27.4 sec    CAPILLARY BLOOD GLUCOSE      POCT Blood Glucose.: 109 mg/dL (27 Mar 2022 06:49)  POCT Blood Glucose.: 114 mg/dL (26 Mar 2022 21:49)  POCT Blood Glucose.: 131 mg/dL (26 Mar 2022 18:57)    LIVER FUNCTIONS - ( 27 Mar 2022 05:41 )  Alb: 3.0 g/dL / Pro: 6.0 g/dL / ALK PHOS: 78 U/L / ALT: 40 U/L / AST: 36 U/L / GGT: x             Cultures:    RADIOLOGY & ADDITIONAL STUDIES:    
STATUS POST:    3/26: robotic converted to lap cholecystectomy, , 2500 crystalloids,      24 hours:  ON: Passed TOV  3/26: robotic converted to lap cholecystectomy. H/H: 12.3/36.8, Mg and Phos repleted, Melatonin for sleep, AM H/H 10.4/31.8      SUBJECTIVE: Pt seen and examined at bedside this am by surgery team. Complaining of R shoulder pain and abdominal pain. Tolerating diet. Denies f/n/v/cp/sob.    MEDICATIONS  (STANDING):  BUpivacaine liposome 1.3% Injectable (no eMAR) 20 milliLiter(s) Local Injection once  cefTRIAXone   IVPB 2000 milliGRAM(s) IV Intermittent every 24 hours  chlorhexidine 2% Cloths 1 Application(s) Topical <User Schedule>  chlorhexidine 4% Liquid 1 Application(s) Topical <User Schedule>  dextrose 5%. 1000 milliLiter(s) (50 mL/Hr) IV Continuous <Continuous>  dextrose 5%. 1000 milliLiter(s) (100 mL/Hr) IV Continuous <Continuous>  dextrose 50% Injectable 25 Gram(s) IV Push once  dextrose 50% Injectable 12.5 Gram(s) IV Push once  dextrose 50% Injectable 25 Gram(s) IV Push once  glucagon  Injectable 1 milliGRAM(s) IntraMuscular once  heparin   Injectable 5000 Unit(s) SubCutaneous every 8 hours  insulin lispro (ADMELOG) corrective regimen sliding scale   SubCutaneous every 6 hours  lactated ringers. 1000 milliLiter(s) (100 mL/Hr) IV Continuous <Continuous>  metroNIDAZOLE  IVPB 500 milliGRAM(s) IV Intermittent every 8 hours    MEDICATIONS  (PRN):  dextrose Oral Gel 15 Gram(s) Oral once PRN Blood Glucose LESS THAN 70 milliGRAM(s)/deciliter  HYDROmorphone  Injectable 0.5 milliGRAM(s) IV Push every 4 hours PRN Moderate Pain (4 - 6)  HYDROmorphone  Injectable 1 milliGRAM(s) IV Push every 4 hours PRN Severe Pain (7 - 10)      Vital Signs Last 24 Hrs  T(C): 36.5 (27 Mar 2022 05:27), Max: 36.9 (26 Mar 2022 18:20)  T(F): 97.7 (27 Mar 2022 05:27), Max: 98.5 (26 Mar 2022 18:20)  HR: 88 (27 Mar 2022 07:00) (68 - 992)  BP: 126/60 (27 Mar 2022 07:00) (105/55 - 183/77)  BP(mean): 85 (27 Mar 2022 07:00) (78 - 122)  RR: 17 (27 Mar 2022 07:00) (17 - 24)  SpO2: 93% (27 Mar 2022 07:00) (93% - 100%)    Physical Exam  General: NAD, resting comfortably in bed  Pulmonary: Nonlabored breathing, no respiratory distress  CV: NSR  Abd: soft, R side tenderness, ND, no guarding, incisions c/d/i, ROSE serosang  Extremities: (-) edema, warm, well-perfused      I&O's Detail    26 Mar 2022 07:01  -  27 Mar 2022 07:00  --------------------------------------------------------  IN:    IV PiggyBack: 600 mL    Lactated Ringers: 1200 mL    Oral Fluid: 200 mL  Total IN: 2000 mL    OUT:    Bulb (mL): 90 mL    Voided (mL): 600 mL  Total OUT: 690 mL    Total NET: 1310 mL      27 Mar 2022 07:01  -  27 Mar 2022 07:32  --------------------------------------------------------  IN:    IV PiggyBack: 50 mL    Lactated Ringers: 100 mL  Total IN: 150 mL    OUT:  Total OUT: 0 mL    Total NET: 150 mL          LABS:                        10.4   6.55  )-----------( 158      ( 27 Mar 2022 05:41 )             31.8     03-27    138  |  103  |  9   ----------------------------<  113<H>  4.4   |  27  |  0.65    Ca    8.5      27 Mar 2022 05:41  Phos  3.5     03-27  Mg     1.9     03-27    TPro  6.0  /  Alb  3.0<L>  /  TBili  0.4  /  DBili  x   /  AST  36  /  ALT  40  /  AlkPhos  78  03-27    PT/INR - ( 26 Mar 2022 18:39 )   PT: 15.2 sec;   INR: 1.27          PTT - ( 26 Mar 2022 18:39 )  PTT:27.4 sec      RADIOLOGY & ADDITIONAL STUDIES:
STATUS POST:    3/26: robotic converted to lap cholecystectomy, , 2500 crystalloids,      24 hours:  O/N: pt refused bowel regimen, christin diet, -N/-V, -BM/-F, +ooba, abd pain, +diffuse ttp, +rebound, +guarding, TMAX 101.1, fever w/u, CBC, CMP, lactate, t&s, upgraded to tele, hgb stable, WBC 10.64 (9.14), atelactasis on cxr, pt splinting 2/2 pain, adjusted pain regiment  3/28: started bowel regimen, OOBA, told diet      SUBJECTIVE: Pt seen and examined at bedside this am by surgery team. Abdominal pain is improved today. Complaining of increased urination and pain with peeing.    MEDICATIONS  (STANDING):  acetaminophen     Tablet .. 650 milliGRAM(s) Oral every 6 hours  cefTRIAXone   IVPB 2000 milliGRAM(s) IV Intermittent every 24 hours  chlorhexidine 2% Cloths 1 Application(s) Topical <User Schedule>  chlorhexidine 4% Liquid 1 Application(s) Topical <User Schedule>  dextrose 5%. 1000 milliLiter(s) (50 mL/Hr) IV Continuous <Continuous>  dextrose 5%. 1000 milliLiter(s) (100 mL/Hr) IV Continuous <Continuous>  dextrose 50% Injectable 25 Gram(s) IV Push once  dextrose 50% Injectable 12.5 Gram(s) IV Push once  dextrose 50% Injectable 25 Gram(s) IV Push once  glucagon  Injectable 1 milliGRAM(s) IntraMuscular once  heparin   Injectable 5000 Unit(s) SubCutaneous every 8 hours  insulin lispro (ADMELOG) corrective regimen sliding scale   SubCutaneous Before meals and at bedtime  lactated ringers. 1000 milliLiter(s) (100 mL/Hr) IV Continuous <Continuous>  metroNIDAZOLE  IVPB 500 milliGRAM(s) IV Intermittent every 8 hours    MEDICATIONS  (PRN):  dextrose Oral Gel 15 Gram(s) Oral once PRN Blood Glucose LESS THAN 70 milliGRAM(s)/deciliter  HYDROmorphone  Injectable 0.5 milliGRAM(s) IV Push every 6 hours PRN Severe Pain (7 - 10)  HYDROmorphone  Injectable 0.5 milliGRAM(s) IV Push every 4 hours PRN Severe Pain (7 - 10)  lidocaine   4% Patch 1 Patch Transdermal daily PRN pain  oxyCODONE    IR 5 milliGRAM(s) Oral every 6 hours PRN Mild Pain (1 - 3)  oxyCODONE    IR 10 milliGRAM(s) Oral every 6 hours PRN Moderate Pain (4 - 6)      Vital Signs Last 24 Hrs  T(C): 36.8 (29 Mar 2022 05:00), Max: 38.4 (28 Mar 2022 20:30)  T(F): 98.2 (29 Mar 2022 05:00), Max: 101.1 (28 Mar 2022 20:30)  HR: 84 (29 Mar 2022 04:14) (83 - 98)  BP: 150/71 (29 Mar 2022 04:14) (131/63 - 158/73)  BP(mean): 102 (29 Mar 2022 04:14) (94 - 102)  RR: 16 (29 Mar 2022 04:14) (15 - 18)  SpO2: 96% (29 Mar 2022 04:14) (93% - 98%)    Physical Exam  General: NAD, resting comfortably in bed  Pulmonary: Nonlabored breathing, no respiratory distress  CV: NSR  Abd: soft, NT/ND, no guarding, incisions c/d/i, ROSE serosang  Extremities: (-) edema, warm, well-perfused      I&O's Detail    27 Mar 2022 07:01  -  28 Mar 2022 07:00  --------------------------------------------------------  IN:    IV PiggyBack: 250 mL    Lactated Ringers: 1900 mL    Oral Fluid: 650 mL  Total IN: 2800 mL    OUT:    Bulb (mL): 100 mL    Voided (mL): 1550 mL  Total OUT: 1650 mL    Total NET: 1150 mL      28 Mar 2022 07:01  -  29 Mar 2022 06:58  --------------------------------------------------------  IN:    IV PiggyBack: 100 mL    IV PiggyBack: 250 mL    IV PiggyBack: 50 mL    IV PiggyBack: 100 mL    IV PiggyBack: 200 mL    Lactated Ringers: 1350 mL    Oral Fluid: 960 mL  Total IN: 3010 mL    OUT:    Bulb (mL): 160 mL    Voided (mL): 2920 mL  Total OUT: 3080 mL    Total NET: -70 mL          LABS:                        11.6   10.64 )-----------( 176      ( 28 Mar 2022 21:35 )             34.4         136  |  99  |  8   ----------------------------<  120<H>  4.2   |  27  |  0.66    Ca    8.9      28 Mar 2022 21:35  Phos  2.1       Mg     1.9         TPro  7.1  /  Alb  3.7  /  TBili  0.3  /  DBili  x   /  AST  24  /  ALT  27  /  AlkPhos  84        Urinalysis Basic - ( 28 Mar 2022 21:31 )    Color: Yellow / Appearance: Clear / S.020 / pH: x  Gluc: x / Ketone: NEGATIVE  / Bili: Negative / Urobili: 0.2 E.U./dL   Blood: x / Protein: NEGATIVE mg/dL / Nitrite: NEGATIVE   Leuk Esterase: NEGATIVE / RBC: x / WBC x   Sq Epi: x / Non Sq Epi: x / Bacteria: x        RADIOLOGY & ADDITIONAL STUDIES:

## 2022-03-29 NOTE — PROGRESS NOTE ADULT - ASSESSMENT
71M pmh psoriatic arthritis (previous Otezla), hepatitis C (cured 8 years ago), cirrhosis, tracheotomy at age 2 (decanulated long time ago), complete heartblock s/p pacemaker 2017, parox afib on eliquis, shoulder surgery, knee surgery 9 months ago (HSS) c/b inflammation requiring aspirations, mild sleep apnea, opioid use w/ high tolerance, recently presented to ED 3/6 w/cholelithiasis cholecystitis, admitted today (3/26) for elective robotic cholecystectomy, intraop severely inflammed GB requiring difficult dissection. Admitted to SICU for HD monitoring, doing well - stepped down to regional.     tele  Low fat/IVF  Pain/Nausea PRN  Ceft/flagyl  OOBA/IS  HSQ/SCD  Start eliquis today  ROSE x1  AM labs

## 2022-03-29 NOTE — DISCHARGE NOTE PROVIDER - NSDCCPGOAL_GEN_ALL_CORE_FT
To get better and follow your care plan as instructed.  -Continue a Low fat diet   -Activity: no heavy lifting or strenuous exercise for one month.  -You may shower but no soaking baths, no swimming pools.  -Notify physician for fever greater than 101, worsening abdominal pain, bleeding or drainage from incision sites.   - Please restart your regular dose of ELIQUIS TONIGHT 3/29/2022.  - Please take cefpodoxime by mouth until 3/31/2022.  - Please take Flagyl 500 mg every 8 hours by mouth until 3/31/2022.  -Follow-up with Dr. Pizano in 1 week. Call the office to make an appointment.   To get better and follow your care plan as instructed.   -Continue a Low fat diet  -Activity: no heavy lifting or strenuous exercise for one month. -You may shower but no soaking baths, no swimming pools. -Notify physician for fever greater than 101, worsening abdominal pain, bleeding or drainage from incision sites.  - Please restart your regular dose of ELIQUIS TONIGHT 3/29/2022. - Please take cefpodoxime by mouth until 3/31/2022. - Please take Flagyl 500 mg every 8 hours by mouth until 3/31/2022. - PLEASE DO NOT TAKE PERCOCET WITH PRESCRIBED OXYCODONE. TAKE EITHER OR  6 HOURS A PART. -Follow-up with Dr. Pizano in 1 week. Call the office to make an appointment.

## 2022-03-30 NOTE — PROCEDURE
[Complete Heart Block] : complete heart block [Pacemaker] : pacemaker [DDD] : DDD [Threshold Testing Performed] : Threshold testing was performed [Lead Imp:  ___ohms] : lead impedance was [unfilled] ohms [Sensing Amplitude ___mv] : sensing amplitude was [unfilled] mv [___V @] : [unfilled] V [___ ms] : [unfilled] ms [None] : none [de-identified] : [facility]  [de-identified] : Assurity MRI [de-identified] : [id number] [de-identified] : 12/2017 [de-identified] : 50/140 [de-identified] : 9.4-10.2 years [de-identified] : AP <1%\par  94%\par last episode of afib 4/2021 - burden <1%

## 2022-03-30 NOTE — DISCUSSION/SUMMARY
[Pacemaker Function Normal] : normal pacemaker function [FreeTextEntry1] : 71 year old male with arthritis, hepatitis C with residual cirrhosis and complete heart block s/p PPM 12/2017, who presents for follow up.  Device interrogation reveals normal function. All measured data is within normal limits. Overall he is doing well with a minimal afib burden.  He has an upcoming surgery and is already holding Eliquis.  He should resume Eliquis as soon as he is cleared from a surgical standpoint.  He is pacemaker-dependent, but has a stable ventricular escape rhythm.  If bipolar cautery is to be used within 6 inches of the device, please apply a magnet to the device to cause asynchronous pacing.  Attempt to avoid the use of monopolar cautery.  He will follow up with me in 6 months for sooner if needed.   He knows to call with any questions or concerns.

## 2022-03-30 NOTE — HISTORY OF PRESENT ILLNESS
[Palpitations] : no palpitations [SOB] : no dyspnea [Syncope] : no syncope [Dizziness] : no dizziness [Chest Pain] : no chest pain or discomfort [Shoulder Pain] : no shoulder pain [Pain at Site] : no pain at device site [Erythema at Site] : no erythema at device site [Swelling at Site] : no swelling at device site [de-identified] : 71 year old male with arthritis, hepatitis C with residual cirrhosis and complete heart block s/p PPM 12/2017, who presents for follow up.  \par \par He was admitted to [facility] 12/2017 with fatigue and was found to be in complete heart block with a escape in the 30s. \par \par He presents for follow up stating that he is upset that he was told he needed cardiac clearance and then told her needed EP clearance (pacemaker instructions given by Dr. Denney).   He has no device related issues,  He denies any SOB, palpitations, orthopnea, PND, peripheral edema or chest pain. He remains on Eliquis.  \par \par

## 2022-03-30 NOTE — REVIEW OF SYSTEMS
[Anxiety] : anxiety [Under Stress] : under stress [Negative] : Heme/Lymph [Fever] : no fever [Chills] : no chills [Feeling Fatigued] : not feeling fatigued [SOB] : no shortness of breath [Dyspnea on exertion] : not dyspnea during exertion [Chest Discomfort] : no chest discomfort [Palpitations] : no palpitations [Syncope] : no syncope [Rash] : no rash [Dizziness] : no dizziness

## 2022-03-31 LAB — HCV RNA FLD QL NAA+PROBE: SIGNIFICANT CHANGE UP

## 2022-04-01 DIAGNOSIS — E83.42 HYPOMAGNESEMIA: ICD-10-CM

## 2022-04-01 DIAGNOSIS — Z95.0 PRESENCE OF CARDIAC PACEMAKER: ICD-10-CM

## 2022-04-01 DIAGNOSIS — L40.50 ARTHROPATHIC PSORIASIS, UNSPECIFIED: ICD-10-CM

## 2022-04-01 DIAGNOSIS — Z91.013 ALLERGY TO SEAFOOD: ICD-10-CM

## 2022-04-01 DIAGNOSIS — Z88.0 ALLERGY STATUS TO PENICILLIN: ICD-10-CM

## 2022-04-01 DIAGNOSIS — N20.0 CALCULUS OF KIDNEY: ICD-10-CM

## 2022-04-01 DIAGNOSIS — Z86.19 PERSONAL HISTORY OF OTHER INFECTIOUS AND PARASITIC DISEASES: ICD-10-CM

## 2022-04-01 DIAGNOSIS — I44.2 ATRIOVENTRICULAR BLOCK, COMPLETE: ICD-10-CM

## 2022-04-01 DIAGNOSIS — E78.5 HYPERLIPIDEMIA, UNSPECIFIED: ICD-10-CM

## 2022-04-01 DIAGNOSIS — K82.A2 PERFORATION OF GALLBLADDER IN CHOLECYSTITIS: ICD-10-CM

## 2022-04-01 DIAGNOSIS — R10.9 UNSPECIFIED ABDOMINAL PAIN: ICD-10-CM

## 2022-04-01 DIAGNOSIS — G47.39 OTHER SLEEP APNEA: ICD-10-CM

## 2022-04-01 DIAGNOSIS — K80.00 CALCULUS OF GALLBLADDER WITH ACUTE CHOLECYSTITIS WITHOUT OBSTRUCTION: ICD-10-CM

## 2022-04-06 LAB — SURGICAL PATHOLOGY STUDY: SIGNIFICANT CHANGE UP

## 2022-04-14 ENCOUNTER — APPOINTMENT (OUTPATIENT)
Dept: SURGERY | Facility: CLINIC | Age: 72
End: 2022-04-14
Payer: MEDICARE

## 2022-04-14 VITALS
OXYGEN SATURATION: 98 % | HEIGHT: 69.5 IN | SYSTOLIC BLOOD PRESSURE: 140 MMHG | BODY MASS INDEX: 19.83 KG/M2 | DIASTOLIC BLOOD PRESSURE: 94 MMHG | HEART RATE: 87 BPM | WEIGHT: 137 LBS | TEMPERATURE: 97.6 F

## 2022-04-14 PROCEDURE — 99024 POSTOP FOLLOW-UP VISIT: CPT

## 2022-05-17 NOTE — ED PROVIDER NOTE - CPE EDP [facility] NORM
Daily Note     Today's date: 2022  Patient name: Chelsea Penny  : 1958  MRN: [id number]  Referring provider: Sharif Cardenas MD  Dx:   Encounter Diagnosis     ICD-10-CM    1  Psoas tendinitis of right side  M76 11                   Subjective:  Patient states that she is doing okay  Objective: See treatment diary below  Assessment:  Patient demonstrates TTP t/o R iliopsoas and adductor mm  STM/MFR to decrease soft tissue restrictions  Patient demonstrates difficulty c/ R glute activation  Added hip mobility and posterior chain NMRE  Tolerated treatment well  Patient would benefit from continued PT  Plan: Continue per plan of care  Precautions:  Hx R SEAMUS;  Hx Breast CA; Osteoporosis    Manuals 5/10 5/17           Iliopsoas release AZ AZ           STM/MFR  AZ                                     Neuro Re-Ed             TA isometrics 20x5s 30x5s                                     Ther Ex             Prone glute squeezes 30x5s 30x5s           Prone heel pushes 30x5s 30x5s           Prone glute sets KF 20x5s 20x5s           Hip add  20x5s           Bridges  2x10 c/ ball           Clams  20x5s           Reverse clams  20x5s                        SL hip abd  20x5s           1/2 kneel hip mobility 2 way  10x ea                        Ther Activity                                       Gait Training                                       Modalities             EPAT
normal...

## 2022-06-24 ENCOUNTER — EMERGENCY (EMERGENCY)
Facility: HOSPITAL | Age: 72
LOS: 1 days | Discharge: HOME | End: 2022-06-24
Attending: EMERGENCY MEDICINE | Admitting: EMERGENCY MEDICINE
Payer: MEDICARE

## 2022-06-24 VITALS
RESPIRATION RATE: 18 BRPM | SYSTOLIC BLOOD PRESSURE: 154 MMHG | WEIGHT: 147.93 LBS | HEIGHT: 69 IN | HEART RATE: 73 BPM | TEMPERATURE: 98 F | DIASTOLIC BLOOD PRESSURE: 88 MMHG | OXYGEN SATURATION: 97 %

## 2022-06-24 VITALS
RESPIRATION RATE: 18 BRPM | TEMPERATURE: 98 F | OXYGEN SATURATION: 97 % | DIASTOLIC BLOOD PRESSURE: 90 MMHG | HEART RATE: 65 BPM | SYSTOLIC BLOOD PRESSURE: 167 MMHG

## 2022-06-24 DIAGNOSIS — Z98.890 OTHER SPECIFIED POSTPROCEDURAL STATES: Chronic | ICD-10-CM

## 2022-06-24 DIAGNOSIS — Z95.0 PRESENCE OF CARDIAC PACEMAKER: ICD-10-CM

## 2022-06-24 DIAGNOSIS — Z88.0 ALLERGY STATUS TO PENICILLIN: ICD-10-CM

## 2022-06-24 DIAGNOSIS — Z79.01 LONG TERM (CURRENT) USE OF ANTICOAGULANTS: ICD-10-CM

## 2022-06-24 DIAGNOSIS — Z98.89 OTHER SPECIFIED POSTPROCEDURAL STATES: Chronic | ICD-10-CM

## 2022-06-24 DIAGNOSIS — Z91.013 ALLERGY TO SEAFOOD: ICD-10-CM

## 2022-06-24 DIAGNOSIS — R42 DIZZINESS AND GIDDINESS: ICD-10-CM

## 2022-06-24 DIAGNOSIS — Z88.8 ALLERGY STATUS TO OTHER DRUGS, MEDICAMENTS AND BIOLOGICAL SUBSTANCES STATUS: ICD-10-CM

## 2022-06-24 PROBLEM — E78.5 HYPERLIPIDEMIA, UNSPECIFIED: Chronic | Status: ACTIVE | Noted: 2022-03-25

## 2022-06-24 LAB
ALBUMIN SERPL ELPH-MCNC: 4.3 G/DL — SIGNIFICANT CHANGE UP (ref 3.3–5)
ALP SERPL-CCNC: 66 U/L — SIGNIFICANT CHANGE UP (ref 40–120)
ALT FLD-CCNC: 26 U/L — SIGNIFICANT CHANGE UP (ref 10–45)
ANION GAP SERPL CALC-SCNC: 10 MMOL/L — SIGNIFICANT CHANGE UP (ref 5–17)
AST SERPL-CCNC: 24 U/L — SIGNIFICANT CHANGE UP (ref 10–40)
BASOPHILS # BLD AUTO: 0.02 K/UL — SIGNIFICANT CHANGE UP (ref 0–0.2)
BASOPHILS NFR BLD AUTO: 0.4 % — SIGNIFICANT CHANGE UP (ref 0–2)
BILIRUB SERPL-MCNC: 0.5 MG/DL — SIGNIFICANT CHANGE UP (ref 0.2–1.2)
BUN SERPL-MCNC: 15 MG/DL — SIGNIFICANT CHANGE UP (ref 7–23)
CALCIUM SERPL-MCNC: 9.2 MG/DL — SIGNIFICANT CHANGE UP (ref 8.4–10.5)
CHLORIDE SERPL-SCNC: 101 MMOL/L — SIGNIFICANT CHANGE UP (ref 96–108)
CO2 SERPL-SCNC: 25 MMOL/L — SIGNIFICANT CHANGE UP (ref 22–31)
CREAT SERPL-MCNC: 0.67 MG/DL — SIGNIFICANT CHANGE UP (ref 0.5–1.3)
EGFR: 99 ML/MIN/1.73M2 — SIGNIFICANT CHANGE UP
EOSINOPHIL # BLD AUTO: 0.08 K/UL — SIGNIFICANT CHANGE UP (ref 0–0.5)
EOSINOPHIL NFR BLD AUTO: 1.6 % — SIGNIFICANT CHANGE UP (ref 0–6)
GLUCOSE SERPL-MCNC: 101 MG/DL — HIGH (ref 70–99)
HCT VFR BLD CALC: 39.9 % — SIGNIFICANT CHANGE UP (ref 39–50)
HGB BLD-MCNC: 13.4 G/DL — SIGNIFICANT CHANGE UP (ref 13–17)
IMM GRANULOCYTES NFR BLD AUTO: 0.2 % — SIGNIFICANT CHANGE UP (ref 0–1.5)
LYMPHOCYTES # BLD AUTO: 1.02 K/UL — SIGNIFICANT CHANGE UP (ref 1–3.3)
LYMPHOCYTES # BLD AUTO: 19.8 % — SIGNIFICANT CHANGE UP (ref 13–44)
MCHC RBC-ENTMCNC: 30.8 PG — SIGNIFICANT CHANGE UP (ref 27–34)
MCHC RBC-ENTMCNC: 33.6 GM/DL — SIGNIFICANT CHANGE UP (ref 32–36)
MCV RBC AUTO: 91.7 FL — SIGNIFICANT CHANGE UP (ref 80–100)
MONOCYTES # BLD AUTO: 0.61 K/UL — SIGNIFICANT CHANGE UP (ref 0–0.9)
MONOCYTES NFR BLD AUTO: 11.9 % — SIGNIFICANT CHANGE UP (ref 2–14)
NEUTROPHILS # BLD AUTO: 3.4 K/UL — SIGNIFICANT CHANGE UP (ref 1.8–7.4)
NEUTROPHILS NFR BLD AUTO: 66.1 % — SIGNIFICANT CHANGE UP (ref 43–77)
NRBC # BLD: 0 /100 WBCS — SIGNIFICANT CHANGE UP (ref 0–0)
PLATELET # BLD AUTO: 122 K/UL — LOW (ref 150–400)
POTASSIUM SERPL-MCNC: 4.2 MMOL/L — SIGNIFICANT CHANGE UP (ref 3.5–5.3)
POTASSIUM SERPL-SCNC: 4.2 MMOL/L — SIGNIFICANT CHANGE UP (ref 3.5–5.3)
PROT SERPL-MCNC: 7.1 G/DL — SIGNIFICANT CHANGE UP (ref 6–8.3)
RBC # BLD: 4.35 M/UL — SIGNIFICANT CHANGE UP (ref 4.2–5.8)
RBC # FLD: 13.9 % — SIGNIFICANT CHANGE UP (ref 10.3–14.5)
SODIUM SERPL-SCNC: 136 MMOL/L — SIGNIFICANT CHANGE UP (ref 135–145)
TROPONIN T SERPL-MCNC: 0.01 NG/ML — SIGNIFICANT CHANGE UP (ref 0–0.01)
WBC # BLD: 5.14 K/UL — SIGNIFICANT CHANGE UP (ref 3.8–10.5)
WBC # FLD AUTO: 5.14 K/UL — SIGNIFICANT CHANGE UP (ref 3.8–10.5)

## 2022-06-24 PROCEDURE — 93005 ELECTROCARDIOGRAM TRACING: CPT

## 2022-06-24 PROCEDURE — 85025 COMPLETE CBC W/AUTO DIFF WBC: CPT

## 2022-06-24 PROCEDURE — 82962 GLUCOSE BLOOD TEST: CPT

## 2022-06-24 PROCEDURE — 84484 ASSAY OF TROPONIN QUANT: CPT

## 2022-06-24 PROCEDURE — 99284 EMERGENCY DEPT VISIT MOD MDM: CPT | Mod: FS

## 2022-06-24 PROCEDURE — 99283 EMERGENCY DEPT VISIT LOW MDM: CPT

## 2022-06-24 PROCEDURE — 36415 COLL VENOUS BLD VENIPUNCTURE: CPT

## 2022-06-24 PROCEDURE — 80053 COMPREHEN METABOLIC PANEL: CPT

## 2022-06-24 RX ORDER — SODIUM CHLORIDE 9 MG/ML
1000 INJECTION INTRAMUSCULAR; INTRAVENOUS; SUBCUTANEOUS ONCE
Refills: 0 | Status: COMPLETED | OUTPATIENT
Start: 2022-06-24 | End: 2022-06-24

## 2022-06-24 RX ORDER — MECLIZINE HCL 12.5 MG
1 TABLET ORAL
Qty: 9 | Refills: 0
Start: 2022-06-24 | End: 2022-06-26

## 2022-06-24 RX ORDER — MECLIZINE HCL 12.5 MG
12.5 TABLET ORAL ONCE
Refills: 0 | Status: COMPLETED | OUTPATIENT
Start: 2022-06-24 | End: 2022-06-24

## 2022-06-24 RX ADMIN — SODIUM CHLORIDE 1000 MILLILITER(S): 9 INJECTION INTRAMUSCULAR; INTRAVENOUS; SUBCUTANEOUS at 14:53

## 2022-06-24 RX ADMIN — Medication 12.5 MILLIGRAM(S): at 14:53

## 2022-06-24 NOTE — ED PROVIDER NOTE - NS ED ATTENDING STATEMENT MOD
I have seen and examined this patient and fully participated in the care of this patient as the teaching attending.  The service was shared with the DENILSON.  I reviewed and verified the documentation and independently performed the documented:

## 2022-06-24 NOTE — PROGRESS NOTE ADULT - SUBJECTIVE AND OBJECTIVE BOX
EPS Device interrogation    Indication: dizziness     Device model: SJM Assurity 		Implanting Physician: Dr. German     Functioning Mode: DDD			    Underlying Rhythm: AS/    Pacemaker dependency:  yes     Battery status: 3.01 V , > 95 %   Interrogating parameters:   				RA			RV			LV    Sense:                                         2.3 mv                 paced at 30 bpm    Threshold:                                 0.5 V @ 0.4 ms           1.25  V@0.4 ms    Pacing Impedance:                              410 om                    430   om    Shock Impedance:                                             n/a     Events/Alert:  none    Parameter change: 	none    Normal function PPM     [ ]EPS attending: Interrogation reviewed. Agree with above.

## 2022-06-24 NOTE — ED PROVIDER NOTE - ATTENDING CONTRIBUTION TO CARE
Pt presenting after an episode of dizziness PTA, room spinning sensation that resolved w/sitting down. Assoc decreased po intake. no chest pain, sob, abd pain, n/v/f/c, cough or any other acute complaints. Well appearing, nad, nc/at, lung cta, heart reg, abd soft, nt, ext no gross deformity, no focal neuro deficits, no diff ambulating, pending labs, EKG, EP interrogation, reassess.

## 2022-06-24 NOTE — ED ADULT NURSE NOTE - CHIEF COMPLAINT QUOTE
pt c/o " feeling dizzy and spinning sensation after bending over to  something". now still light headed. able to speak clear and in full sentences, ambulating without assistance. denies any unilateral weakness, numbness. pmh pacemaker.

## 2022-06-24 NOTE — ED ADULT NURSE NOTE - MODE OF DISCHARGE
Anesthesia review complete, medication instructions given NPO past midnight, Bathe with hibiclens or dial soap the night before & am of surgery. Put nothing on skin -  lotion, deodorant, powder  No metal or jewelry & no valuables pt verbalized understanding and all questions answered  
Yes
Ambulatory

## 2022-06-24 NOTE — ED PROVIDER NOTE - CLINICAL SUMMARY MEDICAL DECISION MAKING FREE TEXT BOX
pt c/o an episode of dizziness PTA -- states that room was spinning - symptoms resolved w/sitting down, admits to decreased po intake yest, non focal neuro exam, hemodynamically stable, no nystagmus on exam, given ivf and dose of meclizine -- ? dehydration vs bpv, ekg paced (interrogated by EP and no arrythmia), labs wnl. pt walking around dept w/o dizziness and requesting dc, no concern for TIA or CVA, po hydration discussed, f/u w/pmd, pt understands and agrees w/plan, strict return precautions given

## 2022-06-24 NOTE — ED PROVIDER NOTE - OBJECTIVE STATEMENT
The pt is a 72 y/o M, who presents to ED c/o dizziness earlier. Pt states that he stood up and "everything started to spin" - had to sit back down and symptoms resolved, currently feeling much better, admits to decreased po intake last night. Pt describes dizziness as everything spinning, comfortable while staying still. Denies cp, sob, n/v/d, abd pain, h/a, visual changes, syncope.

## 2022-06-24 NOTE — ED PROVIDER NOTE - PATIENT PORTAL LINK FT
You can access the FollowMyHealth Patient Portal offered by [facility] by registering at the following website: [email or web address]. By joining [employer]’s FollowMyHealth portal, you will also be able to view your health information using other applications (apps) compatible with our system.

## 2022-11-15 ENCOUNTER — NON-APPOINTMENT (OUTPATIENT)
Age: 72
End: 2022-11-15

## 2023-01-30 RX ORDER — APIXABAN 5 MG/1
5 TABLET, FILM COATED ORAL
Qty: 180 | Refills: 3 | Status: DISCONTINUED | COMMUNITY
Start: 2018-11-08 | End: 2023-01-30

## 2023-01-31 NOTE — OCCUPATIONAL THERAPY INITIAL EVALUATION ADULT - PHYSICAL ASSIST/NONPHYSICAL ASSIST: STAND/SIT, REHAB EVAL
verbal cues/nonverbal cues (demo/gestures)/1 person assist Minocycline Pregnancy And Lactation Text: This medication is Pregnancy Category D and not consider safe during pregnancy. It is also excreted in breast milk.

## 2023-02-06 NOTE — DISCHARGE NOTE NURSING/CASE MANAGEMENT/SOCIAL WORK - NSDCPEELIQUISCOMP_GEN_ALL_CORE
[de-identified] : On examination of left ankle mild residual swelling, no ecchymosis, no erythema.  Skin is intact.  He has no tenderness over the Anterior talofibular ligament, Posterior talofibular ligament, Calcaneal fibular ligament.  No tenderness over the medial or lateral malleolus.  No tenderness over the deltoid ligament.  No tenderness over the Achilles or calcaneus.  No tenderness over the foot.  He is able to plantar flex, dorsiflex, invert and ronald with no pain.  He is no longer wearing the boot.
Apixaban/Eliquis is used to treat and prevent blood clots. If you are not able to swallow the tablets whole, they may be crushed and mixed in water, apple juice, or applesauce and promptly taken within four hours. Never skip a dose of Apixaban/Eliquis. If you forget to take your Apixaban/Eliquis, take a dose as soon as you remember. If it is almost time for your next Apixaban/Eliquis dose, wait until then and take a regular dose. DO NOT take an extra pill to ‘catch up’.  NEVER TAKE A DOUBLE DOSE. Notify your doctor that you missed a dose. Take Apixaban/Eliquis at the same time each morning and evening. Apixaban/Eliquis may be taken with other medication or food.

## 2023-02-20 NOTE — ED ADULT NURSE NOTE - CHPI ED SYMPTOMS NEG
Hematology Consult Note   Date of Service: 2023    Patient: Cande Shields  MRN: [id number]  Admission Date: 2023  Hospital Day # Hospital Day: 4  Primary Outpatient Hematologist: Dr. Kodi Dye     Reason for Consult: Granulomatous polyangitis, recent PE, prior DVT complicated by PE with new bilateral large PE, also wondering if IVC filter indicated     Assessment & Plan:   Cande Shields is a 22 year old female with PMHx of PFO, granulomatosis polyangitis, CKD, HELLP with spontaneous late , unprovoked VTE 3/2021, recently admitted 2022 with GPA flare and possible COVID vs other URI, discharged on 30 days ppx apixaban (renally dosed 2.5 mg PO BID, to end ~23).  Subsequently diagnosed with recurrent PE 23 in the setting of 4 week pregnancy, treated with lovenox 100 mg BID (1 mg/kg).  She was admitted  after c/o ongoing left pleuritic chest pain.  Initially hemodynamically stable, with adequate o2 sats on RA, treated for pneumonia and costochondritis.  No repeat CT chest done, continued on PTA lovenox until routine echo done  to eval cardiac function showed large, highly mobile thrombus in transit in the right atrium, at least 2.2 cm in length.  Underwent attempted right atrial thrombectomy in IR but ultimately required emergency sternotomy with thrombectomy and PFO closure , now on low intensity heparin gtt with plan to escalate to high intensity today and then back to lovenox. B/L LE doppler negative for DVT.   She was also COVID+  and being treated for recurrent COVID.     #Hx of late spontaneous  10/2020  #unprovoked submassive saddle PE with bilateral LE DVT 3/20/21  -Status post thrombolysis + xarelto x 6 months (3/21-), discontinued 2/2 menorrhagia   -? Provoking factors: COVID 2020, miscarriage in 10/2020, GPA, + smoker   -Negative APS testing x 2 (3/21/21 + 10/19/21), negative for factor V Leiden and prothrombin gene mutation   #H/O ? RA  thrombus 3/2021  -seen on TTE at [facility], but not on TTE at [facility]  -Found to have PFO at that time  #thrombosis of left common femoral artery 3/21/21  #Concern for HIT   -Occurred while on heparin for DVT/PE in March 2021 (during 1st day of hospitalization for DVT/PE, HIT considered but HIT ab and RAUL negative so not felt to have HIT)   #Acute right lobar and segmental and left segmental and subsegmental pulmonary emboli. Borderline CT evidence of right heart strain 2/12/23  -In the setting of pregnancy (early first trimester)   -Recent COVID 12/2022, discharged on ppx apixaban x 30 days 12/21-1/18/23      Recommendations:   -Continue heparin until able to transition back to Lovenox 100 mg BID from surgical standpoint  -Will need follow-up in hematology clinic with Dr. Dye to dose lovenox in pregnancy and follow closely for AC plan (currently scheduled for 2/23 PTA, I will message clinic to push back given current hospitalization)  -No indication for IVC filter (no planned breaks in anticoagulation, no LE clot)    Patient was seen and plan of care was discussed with attending physician Dr. Ng     We will continue to follow this patient. Please don't hesitate to contact the Fellow On-Call with questions.    I spent 45 minutes face-to-face or coordinating care of Cande Shields.  Over 50% of our time on the unit was spent counseling the patient and/or coordinating care regarding anticogulation    Eva Vivas PA-C  [facility]  [phone]        HEMATOLOGY STAFF  Patient was seen and evaluated as part of a shared APRN/PA visit.  Chart notes, vital signs, labs, and imaging studies reviewed.     22-year-old woman with a history of unprovoked VTE in March 2021 for which she required thrombolysis.  During that admission, she had a left lower extremity arterial clot which initially raised suspicion for a diagnosis of HIT, but HIT antibody and RAUL testing both negative and in hindsight, she may have had a  paradoxical embolus through her PFO.  Treated with rivaroxaban for 6 months but not kept on long-term anticoagulation due to heavy menstrual bleeding.    Diagnosed February 12, 2023 with recurrent pulmonary embolism in the setting of early first trimester pregnancy.  Managed as an outpatient with enoxaparin which she was taking appropriately.  Presented now with ongoing shortness of breath and pleuritic chest pain and was incidentally found to have large right atrial clot in transit.  Suction thrombectomy unsuccessful and she required surgical removal of the clot.  PFO was closed at that time as well.  Doing well postoperatively, on low intensity IV heparin now with plan to transition to high intensity.    She will need to remain on therapeutic intensity anticoagulation throughout the remainder of this pregnancy and for a minimum of 6 weeks postpartum.  She likely requires long-term anticoagulation but this can be sorted out later.  She also needs her genetic thrombophilia work-up completed (only tested for factor V Leiden, prothrombin gene mutation, and antithrombin deficiency following her event in 2021), but again this can wait, as those results will not alter our current treatment.    Anticipate she will be able to transition to enoxaparin in the next 24 to 48 hours.  Initial dose should be 100 mg every 12 hours, but would recommend checking anti-Xa level after 3 or 4 doses (assuming she is still hospitalized) to ensure that she is in the high therapeutic range.  We will arrange for close follow-up in our clinic following discharge.    Total time 90 minutes.      Karthik Ng MD  Professor of Medicine  Division of Hematology, Oncology, and Transplantation  Director, [facility]    ----------------------------------------------------------------------------------    History of Present Illness:    Cande Shields is a 22 year old female with PMHx of PFO, granulomatosis  polyangitis, CKD, HELLP with spontaneous late , significant unprovoked clotting history (saddle PE and b/l DVT, LLE arterial thrombus) 3/2021 with negative hypercoagulable work-up (APS + familial), recently admitted 2022 with GPA flare and poss. COVID vs other URI, discharged on 30 days ppx apixaban (renally dosed 2.5 mg PO BID, to end ~23).  Per ED notes 23 she reportedly stopped her GPA medications ~1 month ago to become pregnant.  When she was approx 4 weeks pregnant she presented to ED with chest pain (left chest) and SOB and was found to have acute right lobar and segmental and left segmental and subsegmental pulmonary emboli and started on treatment dose lovenox.  She was also treated for strep pharyngitis.  She reports that after starting lovenox the left chest pain never resolved and continued to worsen and she also felt short of breath.  She presented back to ED on . Please see below for further details.      Today she reports that she is feeling well aside from pressure in her mid chest from incision.  The left sided chest pain has resolved.  She is on 1 LPM via nasal canula.  She denies any abnormal bleeding currently.  She did have 1 episode of epistaxis a few days ago, none since.        Hematologic History:  3/20/2021: presented to [facility] with shortness of breath   -CTA : Positive for pulmonary emboli with a very large bilateral clot burden as above, dilation of the main pulmonary artery, and possible right heart strain. A wedge-shaped pleural-based opacity at the right lung base most likely represents a small infarct.   -B/L LE doppler:  1. Positive for DVT in the right proximal calf posterior tibial and peroneal veins, and in the left proximal calf peroneal vein.   2. No DVT above the knee in either lower extremity.  -Initially started on heparin gtt   3/21/21: Developed acute pain and coolness in her left leg and was without pulses   --IR arteriogram and  thrombolysis              -Occlusion of common femoral, profunda and superficial femoral arteries, distal popliteal artery              -TPA infusion started due to unable to suction thrombus or mechanical thrombectomy   --Due to development while on heparin gtt HIT ab sent and negative, RAUL sent and also returned negative but she was on argatroban while this was pending   --APS panel sent and was negative   --Echo: Mobile echodensity (~2 x 1.5cm) in right atrium, likely thrombus.  Cannot confirm attachment location, but most likely originating from or near IVC (also based on CT images).  -Seen by hematology: while primary arterial thrombosis is of concern, a follow up echo with bubble study is recommended to rule out right to left intracardiac shunt given her RA thrombus  3/23/21: Transferred to [facility] due to insurance, was changed to fondaparinux and ultimately xarelto  3/25/21: FVL mutation + prothrombin gene mutation testing negative, protein C >150 (elevated, on ac)   10/7/2021: Seen in clinic by Dr. Kodi Dye for evaluation and treatment of acute DVT and saddle PE.  Had completed 6 months of xarelto 3-8/2021, stopped because of menorrhagia    -APS testing repeated and was negative, Antithrombin level   -She was requested to follow-up in clinic in 1 month but has not been seen since initial appointment   12/21/2022: Consult with Dr. Yemi Mcneill/LISETH Kulkarni for ?of ability to use heparin for DVT ppx as ?of HIT in the past (heparin listed as allergey)  --During admission for recurrent GPA flare in the setting of new petechiae, blood streaked sputum and an KATERINA, also with possible COVID-19 infection vs other viral URI     --Regarding listed heparin allergy noted 3/23/21, her HIT ab screen and RAUL were both negative 3/21/21, not consistent with HIT.  It is ok to use heparin for prophylaxis, although patient prefers not to given prior concern for HIT.   --Regarding significant clot history without clear  provoking factors (possibly due to recent pregnancy and COVID) and negative APS and genetic hypercoagulable work-up and now admitted with GPA flare and questionable re-infection with COVID would recommend anticoagulation for at least the next 30 days (can change to DOAC to avoid injection) and follow-up in clinic with Dr. Dye in 1 month to discuss if anticoagulation should be continued long-term.    -She was discharge on 12/21/22 with apixaban 2.5 mg po BID (renal dosing) and instructed to follow-up with PCP in 7 days to consider increasing to 5 mg PO BID pending renal function (to end around 1/18)  2/12/23: Presented to [facility] ED with fever and cough, in ED hemodynamically stable  -CTA showed acute right lobar and segmental and left segmental and subsegmental pulmonary emboli. Borderline CT evidence of right heart strain.  -WBC 3.2 with ANC 0.7, (new from 2/8 where WBC 6, ANC 3.3)  -OB and pharmacy discussed with plan to start lovenox 1 mg BID, Cr 1.6 with GFR 46  -also tx with Solu-Medrol as well as given a dose of ceftriaxone for a positive strep test.  --Current admission:   #2/17/23: Presented to [facility] ED with ongoing chest left sided pleuritic chest pain which she related to known PE   -CXR showed rounded appearing opacity along the left lateral pleural surface, possible right middle lung consolidation   Findings favor the appearance of a subpleural infarct in the setting of recently diagnosed pulmonary embolus, less likely a loculated effusion  -In ED EKG was without ST changes or abnormalities, she did have new elevation of WBC with left shift, in setting of steroid use, plan to treat for possible pna and observe   -Had adequate O2 sats on RA   -Initially was continued on lovenox and given steroids for costochondritis   2/18/23:   -Echo planned to eval for cardiac function showed large, highly mobile thrombus in transit in the right atrium (at least 2.2 cm in length)   --In IR underwent angiovac for right  atrial clot thrombectomy that failed, ultimately went to OR for emergency sternotomy, thrombectomy and PFO closure   -Received pRBC transfusion for hgb 6 post OR     --2/19: b/l LE doppler negative for DVT     Review of Systems: Pertinent positive and negative systems described in HPI; the remainder of the 14 systems are negative    Past Medical History:  Past Medical History:   Diagnosis Date     ADHD (attention deficit hyperactivity disorder)      DVT, lower extremity, distal (H)      Dysmenorrhea      Femoral artery thrombosis, left (H) 03/2021     Multiple subsegmental pulmonary emboli without acute cor pulmonale (H) 03/2021     PFO (patent foramen ovale)      Preeclampsia, third trimester      Spontaneous pregnancy loss 2020    31 weeks     Stage 3b chronic kidney disease (H)      Wegener's disease, 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    renal biopsy       Past Surgical History:  Past Surgical History:   Procedure Laterality Date     ENT SURGERY  01/01/2000    cyst     EXCISE GANGLION WRIST  01/01/2009     STERNOTOMY  02/18/2023    right atrial thrombectomy     THROMBECTOMY ATRIUM Right 02/18/2023       Social History:  Social History     Socioeconomic History     Marital status: Legally    Tobacco Use     Smoking status: Former     Packs/day: 0.25     Types: Cigarettes     Smokeless tobacco: Current     Tobacco comments:     vaping   Vaping Use     Vaping Use: Some days     Substances: Nicotine, Flavoring     Devices: Disposable   Substance and Sexual Activity     Alcohol use: Not Currently     Drug use: No     Sexual activity: Yes     Partners: Male     Birth control/protection: None   Social History Narrative    Lives with brother 14yo and mother. Pets: Dog Nicholas.    Lives with  in [place], has cat.    Mom lives next door.        Family History  Family History   Problem Relation Age of Onset     Thyroid Disease Mother      Cancer Maternal Grandfather      Asthma No family hx of      Diabetes No family hx of   "      Outpatient Medications:  No current facility-administered medications on file prior to encounter.  Avacopan 10 MG CAPS, Take 30 mg by mouth 2 times daily  benzoyl peroxide 5 % external liquid, Use daily as directed  calcium carbonate-vitamin D (OSCAL) 500-5 MG-MCG tablet, Take 1 tablet by mouth 2 times daily (Patient not taking: Reported on 1/10/2023)  cephALEXin (KEFLEX) 500 MG capsule, Take 1 capsule (500 mg) by mouth 3 times daily for 10 days  clindamycin (CLEOCIN T) 1 % external lotion, 1-2 times daily as spot treatment for pus bumps.  enoxaparin ANTICOAGULANT (LOVENOX) 100 MG/ML syringe, Inject 1 mL (100 mg) Subcutaneous every 12 hours for 30 days  methylPREDNISolone (MEDROL DOSEPAK) 4 MG tablet therapy pack, Follow Package Directions  omeprazole (PRILOSEC) 20 MG DR capsule, Take 1 capsule (20 mg) by mouth daily (Patient not taking: Reported on 1/10/2023)  oxyCODONE (ROXICODONE) 5 MG tablet, Take 1 tablet (5 mg) by mouth every 6 hours as needed for severe pain (7-10)  predniSONE (DELTASONE) 10 MG tablet, Take 10 mg by mouth daily  Prenatal Vit-Fe Fumarate-FA (PRENATAL VITAMINS PO),          Physical Exam:    /59 (BP Location: Right arm)   Pulse 85   Temp 98.4  F (36.9  C) (Oral)   Resp 18   Ht 1.626 m (5' 4\")   Wt 99.8 kg (220 lb)   LMP 01/10/2023 (Approximate)   SpO2 91%   BMI 37.76 kg/m    Gen: Well appearing, in NAD.  Seen sitting up in bedside chair in ICU, father at bedside   HEENT: EOMI, PERRL, mmm, oropharynx clear  CV: Normal rate, regular rhythm. No m/r/g  Pulm: CTAB, no wheezing, normal work of breathing.  On 1 LPM via NC.  Has 2 chest tubes with sanguinous drainage noted   Abd: Soft, nt/nd, no rebound/guarding  Ext: Warm and well perfused. No lower extremity edema  Skin: No rash, cyanosis or petechial lesion  Neuro: Alert and answering questions appropriately.     Labs & Studies: I personally reviewed the following studies:  ROUTINE LABS (Last four results):  CMP  Recent Labs "   Lab 02/20/23 0814 02/20/23 0411 02/19/23 2230 02/19/23  1555 02/19/23  0403 02/19/23  0400 02/19/23  0118 02/19/23  0114 02/18/23 2205 02/18/23  1941   NA  --  135*  --   --   --  136  --  135*  --  137   POTASSIUM  --  4.5  --   --   --  4.6  --  4.6  --  5.0   CHLORIDE  --  104  --   --   --  106  --  106  --  104   CO2  --  21*  --   --   --  18*  --  18*  --  17*   ANIONGAP  --  10  --   --   --  12  --  11  --  16*   * 98 121* 101*   < > 103*   < > 116*   < > 188*   BUN  --  19.5  --   --   --  19.7  --  19.7  --  20.3*   CR  --  1.17*  --   --   --  1.16*  --  1.14*  --  1.10*   GFRESTIMATED  --  66  --   --   --  67  --  69  --  72   KRAIG  --  8.0*  --   --   --  7.7*  --  7.6*  --  8.4*   MAG  --  2.0  --   --   --  2.1  --   --   --  2.5*   PHOS  --  4.2  --   --   --  4.0  --   --   --  4.0   PROTTOTAL  --  4.9*  --   --   --  4.7*  --  4.8*  --  5.2*   ALBUMIN  --  2.5*  --   --   --  2.5*  --  2.5*  --  2.7*   BILITOTAL  --  <0.2  --   --   --  <0.2  --  <0.2  --  0.2   ALKPHOS  --  83  --   --   --  75  --  77  --  93   AST  --  19  --   --   --  21  --  22  --  25   ALT  --  11  --   --   --  12  --  12  --  13    < > = values in this interval not displayed.     CBC  Recent Labs   Lab 02/20/23 0411 02/19/23  1104 02/19/23  0357 02/18/23 1941   WBC 20.5* 18.0* 20.5* 29.7*   RBC 2.97* 3.17* 3.32* 3.69*   HGB 7.7* 8.2* 8.5* 9.4*   HCT 24.9* 26.1* 26.9* 30.4*   MCV 84 82 81 82   MCH 25.9* 25.9* 25.6* 25.5*   MCHC 30.9* 31.4* 31.6 30.9*   RDW 17.2* 17.0* 17.0* 16.9*    231 211 241     INR  Recent Labs   Lab 02/18/23 1941 02/18/23  1740   INR 1.26* 1.26*                  no chest pain/no shortness of breath

## 2023-09-07 ENCOUNTER — NON-APPOINTMENT (OUTPATIENT)
Age: 73
End: 2023-09-07

## 2023-09-07 ENCOUNTER — APPOINTMENT (OUTPATIENT)
Dept: HEART AND VASCULAR | Facility: CLINIC | Age: 73
End: 2023-09-07
Payer: MEDICARE

## 2023-09-07 VITALS
HEIGHT: 69.5 IN | BODY MASS INDEX: 19.83 KG/M2 | HEART RATE: 72 BPM | DIASTOLIC BLOOD PRESSURE: 72 MMHG | WEIGHT: 137 LBS | SYSTOLIC BLOOD PRESSURE: 148 MMHG

## 2023-09-07 PROCEDURE — 99212 OFFICE O/P EST SF 10 MIN: CPT | Mod: 25

## 2023-09-07 PROCEDURE — 93280 PM DEVICE PROGR EVAL DUAL: CPT

## 2023-11-16 NOTE — PHYSICAL THERAPY INITIAL EVALUATION ADULT - LIVES WITH, PROFILE
Giorgio Dyer MD at [address] N/A 07/05/2023    EGD BIOPSY performed by Keyona Godinez MD at [address] JACQUELINE Ji NP  [address]  [id number]  [facility] [id number] [phone]    Schedule an appointment as soon as possible for a visit in 1 day  If symptoms worsen    [facility] [facility]  [address] [facility] [id number]  [phone]  Go in 1 day  If symptoms worsen    Disposition medications (if applicable):  New Prescriptions    No medications on file     ED Provider Disposition spouse

## 2023-12-07 NOTE — ADDENDUM
What Type Of Note Output Would You Prefer (Optional)?: Standard Output [FreeTextEntry1] :  I, Edmund German, hereby attest that the medical record entry for today accurately signatures/notations that I made in my capacity as Attending Physician when I treated/diagnosed the above patient.  I do hereby attest that this information is true, accurate and complete to the best of my knowledge and I understand that any falsification, omission, or concealment of material fact may subject me to administrative, civil, or criminal liability. I was present for the entire visit with the patient and I was involved in all the critical and key components of the visit. Has Your Skin Lesion Been Treated?: not been treated Is This A New Presentation, Or A Follow-Up?: Skin Lesion

## 2024-01-30 ENCOUNTER — EMERGENCY (EMERGENCY)
Facility: HOSPITAL | Age: 74
LOS: 1 days | Discharge: HOME | End: 2024-01-30
Attending: STUDENT IN AN ORGANIZED HEALTH CARE EDUCATION/TRAINING PROGRAM | Admitting: STUDENT IN AN ORGANIZED HEALTH CARE EDUCATION/TRAINING PROGRAM
Payer: MEDICARE

## 2024-01-30 VITALS
HEART RATE: 91 BPM | SYSTOLIC BLOOD PRESSURE: 151 MMHG | OXYGEN SATURATION: 98 % | TEMPERATURE: 98 F | DIASTOLIC BLOOD PRESSURE: 82 MMHG | RESPIRATION RATE: 17 BRPM

## 2024-01-30 VITALS
RESPIRATION RATE: 17 BRPM | SYSTOLIC BLOOD PRESSURE: 138 MMHG | OXYGEN SATURATION: 97 % | DIASTOLIC BLOOD PRESSURE: 78 MMHG | HEIGHT: 69 IN | TEMPERATURE: 98 F | WEIGHT: 160.06 LBS | HEART RATE: 74 BPM

## 2024-01-30 DIAGNOSIS — R04.2 HEMOPTYSIS: ICD-10-CM

## 2024-01-30 DIAGNOSIS — Z91.013 ALLERGY TO SEAFOOD: ICD-10-CM

## 2024-01-30 DIAGNOSIS — Z98.890 OTHER SPECIFIED POSTPROCEDURAL STATES: Chronic | ICD-10-CM

## 2024-01-30 DIAGNOSIS — Z79.01 LONG TERM (CURRENT) USE OF ANTICOAGULANTS: ICD-10-CM

## 2024-01-30 DIAGNOSIS — Z88.0 ALLERGY STATUS TO PENICILLIN: ICD-10-CM

## 2024-01-30 DIAGNOSIS — Z98.89 OTHER SPECIFIED POSTPROCEDURAL STATES: Chronic | ICD-10-CM

## 2024-01-30 DIAGNOSIS — L40.50 ARTHROPATHIC PSORIASIS, UNSPECIFIED: ICD-10-CM

## 2024-01-30 DIAGNOSIS — Z88.8 ALLERGY STATUS TO OTHER DRUGS, MEDICAMENTS AND BIOLOGICAL SUBSTANCES: ICD-10-CM

## 2024-01-30 DIAGNOSIS — I48.0 PAROXYSMAL ATRIAL FIBRILLATION: ICD-10-CM

## 2024-01-30 DIAGNOSIS — K74.60 UNSPECIFIED CIRRHOSIS OF LIVER: ICD-10-CM

## 2024-01-30 LAB
ALBUMIN SERPL ELPH-MCNC: 4.4 G/DL — SIGNIFICANT CHANGE UP (ref 3.3–5)
ALP SERPL-CCNC: 74 U/L — SIGNIFICANT CHANGE UP (ref 40–120)
ALT FLD-CCNC: 26 U/L — SIGNIFICANT CHANGE UP (ref 10–45)
ANION GAP SERPL CALC-SCNC: 10 MMOL/L — SIGNIFICANT CHANGE UP (ref 5–17)
APTT BLD: 34.8 SEC — SIGNIFICANT CHANGE UP (ref 24.5–35.6)
AST SERPL-CCNC: 25 U/L — SIGNIFICANT CHANGE UP (ref 10–40)
BASOPHILS # BLD AUTO: 0.03 K/UL — SIGNIFICANT CHANGE UP (ref 0–0.2)
BASOPHILS NFR BLD AUTO: 0.5 % — SIGNIFICANT CHANGE UP (ref 0–2)
BILIRUB SERPL-MCNC: 0.5 MG/DL — SIGNIFICANT CHANGE UP (ref 0.2–1.2)
BUN SERPL-MCNC: 19 MG/DL — SIGNIFICANT CHANGE UP (ref 7–23)
CALCIUM SERPL-MCNC: 9.6 MG/DL — SIGNIFICANT CHANGE UP (ref 8.4–10.5)
CHLORIDE SERPL-SCNC: 106 MMOL/L — SIGNIFICANT CHANGE UP (ref 96–108)
CO2 SERPL-SCNC: 28 MMOL/L — SIGNIFICANT CHANGE UP (ref 22–31)
CREAT SERPL-MCNC: 1.01 MG/DL — SIGNIFICANT CHANGE UP (ref 0.5–1.3)
EGFR: 78 ML/MIN/1.73M2 — SIGNIFICANT CHANGE UP
EOSINOPHIL # BLD AUTO: 0.13 K/UL — SIGNIFICANT CHANGE UP (ref 0–0.5)
EOSINOPHIL NFR BLD AUTO: 2.2 % — SIGNIFICANT CHANGE UP (ref 0–6)
GLUCOSE SERPL-MCNC: 83 MG/DL — SIGNIFICANT CHANGE UP (ref 70–99)
HCT VFR BLD CALC: 42.3 % — SIGNIFICANT CHANGE UP (ref 39–50)
HGB BLD-MCNC: 14.2 G/DL — SIGNIFICANT CHANGE UP (ref 13–17)
IMM GRANULOCYTES NFR BLD AUTO: 0.3 % — SIGNIFICANT CHANGE UP (ref 0–0.9)
INR BLD: 1.41 — HIGH (ref 0.85–1.18)
LYMPHOCYTES # BLD AUTO: 0.88 K/UL — LOW (ref 1–3.3)
LYMPHOCYTES # BLD AUTO: 14.7 % — SIGNIFICANT CHANGE UP (ref 13–44)
MCHC RBC-ENTMCNC: 31.9 PG — SIGNIFICANT CHANGE UP (ref 27–34)
MCHC RBC-ENTMCNC: 33.6 GM/DL — SIGNIFICANT CHANGE UP (ref 32–36)
MCV RBC AUTO: 95.1 FL — SIGNIFICANT CHANGE UP (ref 80–100)
MONOCYTES # BLD AUTO: 0.57 K/UL — SIGNIFICANT CHANGE UP (ref 0–0.9)
MONOCYTES NFR BLD AUTO: 9.5 % — SIGNIFICANT CHANGE UP (ref 2–14)
NEUTROPHILS # BLD AUTO: 4.36 K/UL — SIGNIFICANT CHANGE UP (ref 1.8–7.4)
NEUTROPHILS NFR BLD AUTO: 72.8 % — SIGNIFICANT CHANGE UP (ref 43–77)
NRBC # BLD: 0 /100 WBCS — SIGNIFICANT CHANGE UP (ref 0–0)
PLATELET # BLD AUTO: 140 K/UL — LOW (ref 150–400)
POTASSIUM SERPL-MCNC: 4.2 MMOL/L — SIGNIFICANT CHANGE UP (ref 3.5–5.3)
POTASSIUM SERPL-SCNC: 4.2 MMOL/L — SIGNIFICANT CHANGE UP (ref 3.5–5.3)
PROT SERPL-MCNC: 7.2 G/DL — SIGNIFICANT CHANGE UP (ref 6–8.3)
PROTHROM AB SERPL-ACNC: 15.9 SEC — HIGH (ref 9.5–13)
RBC # BLD: 4.45 M/UL — SIGNIFICANT CHANGE UP (ref 4.2–5.8)
RBC # FLD: 13.2 % — SIGNIFICANT CHANGE UP (ref 10.3–14.5)
SODIUM SERPL-SCNC: 144 MMOL/L — SIGNIFICANT CHANGE UP (ref 135–145)
WBC # BLD: 5.99 K/UL — SIGNIFICANT CHANGE UP (ref 3.8–10.5)
WBC # FLD AUTO: 5.99 K/UL — SIGNIFICANT CHANGE UP (ref 3.8–10.5)

## 2024-01-30 PROCEDURE — 71046 X-RAY EXAM CHEST 2 VIEWS: CPT | Mod: 26

## 2024-01-30 PROCEDURE — 99283 EMERGENCY DEPT VISIT LOW MDM: CPT | Mod: 25

## 2024-01-30 PROCEDURE — 71046 X-RAY EXAM CHEST 2 VIEWS: CPT

## 2024-01-30 PROCEDURE — 85610 PROTHROMBIN TIME: CPT

## 2024-01-30 PROCEDURE — 85025 COMPLETE CBC W/AUTO DIFF WBC: CPT

## 2024-01-30 PROCEDURE — 99284 EMERGENCY DEPT VISIT MOD MDM: CPT | Mod: FS

## 2024-01-30 PROCEDURE — 36415 COLL VENOUS BLD VENIPUNCTURE: CPT

## 2024-01-30 PROCEDURE — 85730 THROMBOPLASTIN TIME PARTIAL: CPT

## 2024-01-30 PROCEDURE — 80053 COMPREHEN METABOLIC PANEL: CPT

## 2024-01-30 NOTE — ED ADULT NURSE NOTE - CHIEF COMPLAINT QUOTE
"I have had mucus for a few months. Today I coughed up what I thought was blood clots twice today. I also have had some stomach pain in the middle of my abdomen. " Hx A. fib on Xarelto, pacemaker. Denies chest pain, shortness of breath, fevers/chills, nausea/vomiting.

## 2024-01-30 NOTE — ED PROVIDER NOTE - CLINICAL SUMMARY MEDICAL DECISION MAKING FREE TEXT BOX
74 yo M with pmh of psoriatic arthritis , hepatitis C s/p tx, cirrhosis, tracheotomy at age 2 (decanulated long time ago), complete heartblock s/p pacemaker 2017, paroxysmal afib on Eliquis, psh cholecystectomy, c/o coughing up a small clot of blood today. Pt states he has a lot of mucous for years for which he constantly has to clear his throat. Today clear it and noticed it was blood. Described as what looked like a blood  clot. denies fever, chills, n/v, abd pain, cp, sob, palpitations, recent travel, wt loss, black/bloody stools. VSs. Well appearing. Lungs cta b/l. Labs wnl. Do not suspect PE. No signs of infection. cxr clear. will dc with f/u with pcp

## 2024-01-30 NOTE — ED ADULT NURSE NOTE - BEFAST BALANCE
From: David Andrews  To: Car Bradley  Sent: 12/8/2023 11:42 AM CST  Subject: 2nd shingles shot    Please update my medical history. I received my second shingle shot at the [facility] on Tuesday 12-5-2023. I am now fully vaccinated for getting shingles.  Please let me know when you have updated my records   No

## 2024-01-30 NOTE — ED PROVIDER NOTE - PATIENT PORTAL LINK FT
You can access the FollowMyHealth Patient Portal offered by [facility] by registering at the following website: [email or web address]. By joining [facility]’s FollowMyHealth portal, you will also be able to view your health information using other applications (apps) compatible with our system.

## 2024-01-30 NOTE — ED ADULT NURSE NOTE - OBJECTIVE STATEMENT
Pt A&ox4 and able to speak in complete sentences. Pt breathing even and unlabored with equal chest rise and fall. pt arrived d/t bloody coughs over today. Pt  also having some abd discomfort. Pt has pmh afib now on blood thinner and pacemaker. Pt denies n, v, lightheadedness, dizziness, sob, fevers, chills, cp.

## 2024-01-30 NOTE — ED PROVIDER NOTE - OBJECTIVE STATEMENT
74 yo M with pmh of psoriatic arthritis , hepatitis C s/p tx, cirrhosis, tracheotomy at age 2 (decanulated long time ago), complete heartblock s/p pacemaker 2017, paroxysmal afib on Eliquis, psh cholecystectomy, c/o coughing up a small clot of blood today. Pt states he has a lot of mucous for years for which he constantly has to clear his throat. Today clear it and noticed it was blood. Described as what looked like a blood  clot. denies fever, chills, n/v, abd pain, cp, sob, palpitations, recent travel, wt loss, black/bloody stools. Had a normal EGD this past sept.

## 2024-01-30 NOTE — ED ADULT NURSE NOTE - NSFALLHARMRISKINTERV_ED_ALL_ED

## 2024-01-30 NOTE — ED ADULT TRIAGE NOTE - CHIEF COMPLAINT QUOTE
Triceps... "I have had mucus for a few months. Today I coughed up what I thought was blood clots twice today. I also have had some stomach pain in the middle of my abdomen. " Hx A. fib on Xarelto, pacemaker. Denies chest pain, shortness of breath, fevers/chills, nausea/vomiting.

## 2024-01-30 NOTE — ED PROVIDER NOTE - NSFOLLOWUPINSTRUCTIONS_ED_ALL_ED_FT
Please follow up with your primary care doctor in 24-48 hours. Return to ED for any worsening or emergent symptoms.    Your blood work and chest xray are normal    Return to ED for worsening pain, fever, chills, cough, difficulty breathing, vomiting blood, black or bloody stools or any other concerning symptoms

## 2024-03-07 ENCOUNTER — NON-APPOINTMENT (OUTPATIENT)
Age: 74
End: 2024-03-07

## 2024-03-07 ENCOUNTER — APPOINTMENT (OUTPATIENT)
Dept: HEART AND VASCULAR | Facility: CLINIC | Age: 74
End: 2024-03-07
Payer: MEDICARE

## 2024-03-07 VITALS
WEIGHT: 160 LBS | DIASTOLIC BLOOD PRESSURE: 81 MMHG | SYSTOLIC BLOOD PRESSURE: 147 MMHG | BODY MASS INDEX: 23.16 KG/M2 | HEART RATE: 71 BPM | HEIGHT: 69.5 IN

## 2024-03-07 DIAGNOSIS — I44.2 ATRIOVENTRICULAR BLOCK, COMPLETE: ICD-10-CM

## 2024-03-07 DIAGNOSIS — I48.0 PAROXYSMAL ATRIAL FIBRILLATION: ICD-10-CM

## 2024-03-07 PROCEDURE — 93280 PM DEVICE PROGR EVAL DUAL: CPT

## 2024-03-12 PROBLEM — I44.2 CHB (COMPLETE HEART BLOCK): Status: ACTIVE | Noted: 2018-05-08

## 2024-03-12 PROBLEM — I48.0 PAROXYSMAL ATRIAL FIBRILLATION: Status: ACTIVE | Noted: 2018-11-08

## 2024-03-14 NOTE — PROCEDURE
[Complete Heart Block] : complete heart block [Pacemaker] : pacemaker [DDD] : DDD [Threshold Testing Performed] : Threshold testing was performed [Lead Imp:  ___ohms] : lead impedance was [unfilled] ohms [Sensing Amplitude ___mv] : sensing amplitude was [unfilled] mv [___V @] : [unfilled] V [___ ms] : [unfilled] ms [None] : none [de-identified] : [facility] [employer]  [de-identified] : Assurity MRI [de-identified] : [id number] [de-identified] : 12/2017 [de-identified] : 50/140 [de-identified] : 3 years, 9 months [de-identified] : AP 1.8%  99.98%

## 2024-03-14 NOTE — ADDENDUM
[FreeTextEntry1] : I, Edmund German, hereby attest that the medical record entry for this patient accurately reflects signatures/notations that I made on the Date of Service in my capacity as an Attending Physician when I treated/diagnosed the above patient. I do hereby attest that this information is true, accurate and complete to the best of my knowledge and I understand that any falsification, omission, or concealment of material fact may subject me to administrative, civil, or, criminal liability. I agree with the note as written by my PA in its entirety. I was present for the entire visit and supervised the entire visit and agree with the plan as outlined.  I, Law Morris, am scribing for (in the presence of) Dr. German the following sections: HPI, PMH,Family/social history, ROS, Physical Exam, Assessment / Plan.

## 2024-03-14 NOTE — HISTORY OF PRESENT ILLNESS
[Palpitations] : no palpitations [SOB] : no dyspnea [Syncope] : no syncope [Dizziness] : no dizziness [Chest Pain] : no chest pain or discomfort [Shoulder Pain] : no shoulder pain [Pain at Site] : no pain at device site [Erythema at Site] : no erythema at device site [Swelling at Site] : no swelling at device site [de-identified] : 73 year old male with arthritis, hepatitis C with residual cirrhosis and complete heart block s/p PPM 12/2017, who presents for follow up.    He was admitted to [facility] 12/2017 with fatigue and was found to be in complete heart block with a escape in the 30s.   He has no device related issues, He denies any SOB, palpitations, orthopnea, PND, peripheral edema or chest pain. He remains on Eliquis which he is compliant with.

## 2024-03-14 NOTE — PHYSICAL EXAM
[Normal Appearance] : normal appearance [General Appearance - Well Developed] : well developed [General Appearance - Well Nourished] : well nourished [Well Groomed] : well groomed [No Deformities] : no deformities [General Appearance - In No Acute Distress] : no acute distress [Heart Rate And Rhythm] : heart rate and rhythm were normal [Heart Sounds] : normal S1 and S2 [] : no respiratory distress [Edema] : no peripheral edema present [Respiration, Rhythm And Depth] : normal respiratory rhythm and effort [Exaggerated Use Of Accessory Muscles For Inspiration] : no accessory muscle use [Left Infraclavicular] : left infraclavicular area [Clean] : clean [Dry] : dry [Well-Healed] : well-healed [Serosanguineous Drainage] : no serosanquineous drainage [Auscultation Breath Sounds / Voice Sounds] : lungs were clear to auscultation bilaterally [Palpable Crepitus] : no palpable crepitus [Bleeding] : no active bleeding [Foul Odor] : no foul smell [Purulent Drainage] : no purulent drainage [Serous Drainage] : no serous drainage [Erythema] : not erythematous [Warm] : not warm [Tender] : not tender [Indurated] : not indurated [Fluctuant] : not fluctuant

## 2024-03-14 NOTE — REVIEW OF SYSTEMS
[Rash] : no rash [Dizziness] : no dizziness [Anxiety] : anxiety [Under Stress] : under stress [Negative] : Heme/Lymph [Fever] : no fever [Chills] : no chills [Feeling Fatigued] : not feeling fatigued [SOB] : no shortness of breath [Dyspnea on exertion] : not dyspnea during exertion [Chest Discomfort] : no chest discomfort [Palpitations] : no palpitations [Syncope] : no syncope

## 2024-03-14 NOTE — DISCUSSION/SUMMARY
[Pacemaker Function Normal] : normal pacemaker function [FreeTextEntry1] : 73 year old male with arthritis, hepatitis C with residual cirrhosis and complete heart block s/p PPM 12/2017, who presents for follow up.  Device interrogation reveals normal function. All measured data is within normal limits. No recent AF (since last reset).  He remains on Eliquis. He is pacemaker-dependent, but has a stable ventricular escape rhythm. Follow up in 6 months or sooner if needed.  He knows to call with any questions or concerns.

## 2024-04-23 RX ORDER — RIVAROXABAN 20 MG/1
20 TABLET, FILM COATED ORAL
Qty: 90 | Refills: 3 | Status: ACTIVE | COMMUNITY
Start: 2023-01-30 | End: 1900-01-01

## 2024-07-29 ENCOUNTER — APPOINTMENT (OUTPATIENT)
Dept: UROLOGY | Facility: CLINIC | Age: 74
End: 2024-07-29
Payer: MEDICARE

## 2024-07-29 VITALS
HEIGHT: 69.5 IN | HEART RATE: 68 BPM | SYSTOLIC BLOOD PRESSURE: 144 MMHG | OXYGEN SATURATION: 98 % | TEMPERATURE: 98 F | BODY MASS INDEX: 23.16 KG/M2 | DIASTOLIC BLOOD PRESSURE: 74 MMHG | WEIGHT: 160 LBS

## 2024-07-29 DIAGNOSIS — N52.9 MALE ERECTILE DYSFUNCTION, UNSPECIFIED: ICD-10-CM

## 2024-07-29 PROCEDURE — G2211 COMPLEX E/M VISIT ADD ON: CPT

## 2024-07-29 PROCEDURE — 99204 OFFICE O/P NEW MOD 45 MIN: CPT

## 2024-07-29 NOTE — HISTORY OF PRESENT ILLNESS
[FreeTextEntry1] : Language: English Accompanied by: Self Contact info: [phone] Referring Provider/PCP:   Initial H&P 07/29/2024: The patient has been a private practice patient of Dr. Bryson Naylor Any Prior paper chart is scanned into the [employer] system. Please see admin   Mr. LLOYD is a very pleasant 73 year-old gentleman who presents today for initial evaluation of establishment of care.  He was initially seen by Dr. Naylor on 10/18/2010 for ED and BPH.  He was prescribed cialis 20mg PRN.  He was then rec'd to undergo CT A/P (unclear reasons), and he was dx with renal cysts.  Was also noted to have firm nodule on right side of prostate. He was then switched to viagra due to no effect from the cialis, and on f/u, stated the viagra was ineffective as well. Was switched to daily cialis.    On 2/3/16, he followed up with c/o rt flank pain and was dx with R UVJ stone. On f/u imaging, had no stone and no longer had symptoms. He then had microhematuria and was prescribed abx.  Microheme resolved.   He was ultimately started on flomax 7/2021. Flomax was then d/c'd on f/u (pt stated that it caused increased frequency). He was then started on oxybutynin. Based on chart review, the oxybutynin was not continued after that visit, however unclear why.    IPSS today 18, QoL: 2  States that he feels well today with no complaints. Had PSA checked 2/2024 most recently, and was told that it was normal.    Voids 3x/night.  No changes in erection. Happy with sildenafil.  --------------------------------------------------------------------------------------------------------------------------------------- PMHx: Depression, Insomnia, Hep C, Arthritis, HLD, AFib s/p Pacemaker and on Xarelto PSHx: Left Shoulder, TnA, Hernia, Knee Replacement, Pacemaker SHx: dentheresa x3   All: Prednisone, PCN, Shellfish  --------------------------------------------------------------------------------------------------------------------------------------- Physical Exam: General: NAD, sitting on exam table comfortably HEENT: NCAT, EOMI Resp: breathing comfortably on RA, b/l symm chest rise Cardiac: RRR Abd: SNTND Back: (-) CVAT b/l MSK: LEO reyes/ FROM Psych: appropriate affect Rectal: normal rectal tone, prostate smooth, ~30g, no nodules or induration, BRBPR --------------------------------------------------------------------------------------------------------------------------------------- Results: PSA 07/29/2021: 0.30 08/21/2020: 0.51 01/06/2018: 0.42 02/13/2017: 0.50 11/10/2015: 0.37 01/30/2014: 0.51 10/14/2010: 0.37  RBUS 07/23/21 ([facility]): (+) jets, PVR 38, no hydro CT A/P Non-con 01/03/16 ([facility]): 3x4.5x4.5mm R UVJ stone, prostate 32cc --------------------------------------------------------------------------------------------------------------------------------------- A/P: 73M with BPH, Nephrolithiasis, and ED.   1. BPH Currently not bothered by his symptoms. Was previously prescribed tamsulosin, which made the frequency worse.  Will continue to monitor symptoms, and if worsen, will discuss alternative PO options vs procedure.   2. Nephrolithiasis Currently without any symptoms. Will continue to monitor. May consider repeat RBUS in the next 6-12 months given no recent surveillance imaging.   3. Erectile Dysfunction Well-controlled on 100mg sildenafil. PMD recently checked T, and pt was told it was WNL.  Cont sildenafil - refill sent today.    I spent a total of 49 minutes on face-to-face counseling, coordination of care, review of prior records (including entire paper chart) and clinical documentation.

## 2024-09-11 ENCOUNTER — APPOINTMENT (OUTPATIENT)
Dept: HEART AND VASCULAR | Facility: CLINIC | Age: 74
End: 2024-09-11
Payer: MEDICARE

## 2024-09-11 VITALS
DIASTOLIC BLOOD PRESSURE: 87 MMHG | WEIGHT: 154 LBS | HEIGHT: 69.5 IN | BODY MASS INDEX: 22.3 KG/M2 | TEMPERATURE: 97.9 F | HEART RATE: 87 BPM | SYSTOLIC BLOOD PRESSURE: 145 MMHG

## 2024-09-11 DIAGNOSIS — I44.2 ATRIOVENTRICULAR BLOCK, COMPLETE: ICD-10-CM

## 2024-09-11 PROCEDURE — 93280 PM DEVICE PROGR EVAL DUAL: CPT

## 2024-09-11 PROCEDURE — G2211 COMPLEX E/M VISIT ADD ON: CPT

## 2024-09-16 NOTE — HISTORY OF PRESENT ILLNESS
[Palpitations] : no palpitations [SOB] : no dyspnea [Syncope] : no syncope [Dizziness] : no dizziness [Chest Pain] : no chest pain or discomfort [Shoulder Pain] : no shoulder pain [Pain at Site] : no pain at device site [Erythema at Site] : no erythema at device site [Swelling at Site] : no swelling at device site [de-identified] : 73 year old male with arthritis, hepatitis C with residual cirrhosis and complete heart block s/p PPM 12/2017, who presents for follow up.    He was admitted to [facility] 12/2017 with fatigue and was found to be in complete heart block with a escape in the 30s.   He has no device related complaints, He denies any SOB, palpitations, orthopnea, PND, peripheral edema or chest pain. He remains on Eliquis which he is compliant with.

## 2024-09-16 NOTE — DISCUSSION/SUMMARY
[Pacemaker Function Normal] : normal pacemaker function [FreeTextEntry1] : 73 year old male with arthritis, hepatitis C with residual cirrhosis and complete heart block s/p PPM 12/2017, who presents for follow up.  Device interrogation reveals normal function. All measured data is within normal limits. No recent AF (since last reset) and he remains on Eliquis. He is pacemaker-dependent, but has a stable ventricular escape rhythm.  Recommend yearly echocardiograms.  Follow up in 6 months or sooner if needed.  He knows to call with any questions or concerns.

## 2024-09-16 NOTE — HISTORY OF PRESENT ILLNESS
[Palpitations] : no palpitations [SOB] : no dyspnea [Syncope] : no syncope [Dizziness] : no dizziness [Chest Pain] : no chest pain or discomfort [Shoulder Pain] : no shoulder pain [Pain at Site] : no pain at device site [Erythema at Site] : no erythema at device site [Swelling at Site] : no swelling at device site [de-identified] : 73 year old male with arthritis, hepatitis C with residual cirrhosis and complete heart block s/p PPM 12/2017, who presents for follow up.    He was admitted to [facility] 12/2017 with fatigue and was found to be in complete heart block with a escape in the 30s.   He has no device related complaints, He denies any SOB, palpitations, orthopnea, PND, peripheral edema or chest pain. He remains on Eliquis which he is compliant with.

## 2024-09-16 NOTE — PROCEDURE
[Complete Heart Block] : complete heart block [Pacemaker] : pacemaker [DDD] : DDD [Threshold Testing Performed] : Threshold testing was performed [Lead Imp:  ___ohms] : lead impedance was [unfilled] ohms [___ ms] : [unfilled] ms [None] : none [Sensing Amplitude ___mv] : sensing amplitude was [unfilled] mv [___V @] : [unfilled] V [de-identified] : [employer]  [de-identified] : Assurity MRI [de-identified] : [id number] [de-identified] : 12/2017 [de-identified] : 50/140 [de-identified] : 3 years,  [de-identified] : AP 2%  99.98%\ no events

## 2024-09-16 NOTE — PHYSICAL EXAM
Clearance given for phys ed and sports      RTO for well check 1/2022 [General Appearance - Well Developed] : well developed [Normal Appearance] : normal appearance [Well Groomed] : well groomed [General Appearance - Well Nourished] : well nourished [No Deformities] : no deformities [General Appearance - In No Acute Distress] : no acute distress [Heart Rate And Rhythm] : heart rate and rhythm were normal [Heart Sounds] : normal S1 and S2 [Edema] : no peripheral edema present [] : no respiratory distress [Respiration, Rhythm And Depth] : normal respiratory rhythm and effort [Exaggerated Use Of Accessory Muscles For Inspiration] : no accessory muscle use [Auscultation Breath Sounds / Voice Sounds] : lungs were clear to auscultation bilaterally [Left Infraclavicular] : left infraclavicular area [Clean] : clean [Dry] : dry [Well-Healed] : well-healed [Palpable Crepitus] : no palpable crepitus [Bleeding] : no active bleeding [Foul Odor] : no foul smell [Purulent Drainage] : no purulent drainage [Serosanguineous Drainage] : no serosanquineous drainage [Serous Drainage] : no serous drainage [Erythema] : not erythematous [Warm] : not warm [Tender] : not tender [Indurated] : not indurated [Fluctuant] : not fluctuant

## 2024-09-16 NOTE — HISTORY OF PRESENT ILLNESS
[Palpitations] : no palpitations [SOB] : no dyspnea [Syncope] : no syncope [Dizziness] : no dizziness [Chest Pain] : no chest pain or discomfort [Shoulder Pain] : no shoulder pain [Pain at Site] : no pain at device site [Erythema at Site] : no erythema at device site [Swelling at Site] : no swelling at device site [de-identified] : 73 year old male with arthritis, hepatitis C with residual cirrhosis and complete heart block s/p PPM 12/2017, who presents for follow up.    He was admitted to [facility] 12/2017 with fatigue and was found to be in complete heart block with a escape in the 30s.   He has no device related complaints, He denies any SOB, palpitations, orthopnea, PND, peripheral edema or chest pain. He remains on Eliquis which he is compliant with.

## 2024-09-16 NOTE — REVIEW OF SYSTEMS
[Feeling Fatigued] : not feeling fatigued [Dyspnea on exertion] : not dyspnea during exertion [Anxiety] : anxiety [Fever] : no fever [Weight Gain (___ Lbs)] : no recent weight gain [Chills] : no chills [Weight Loss (___ Lbs)] : no recent weight loss [SOB] : no shortness of breath [Chest Discomfort] : no chest discomfort [Palpitations] : no palpitations [Syncope] : no syncope [Negative] : Integumentary

## 2024-09-16 NOTE — PROCEDURE
[Complete Heart Block] : complete heart block [Pacemaker] : pacemaker [DDD] : DDD [Threshold Testing Performed] : Threshold testing was performed [Lead Imp:  ___ohms] : lead impedance was [unfilled] ohms [___ ms] : [unfilled] ms [None] : none [Sensing Amplitude ___mv] : sensing amplitude was [unfilled] mv [___V @] : [unfilled] V [de-identified] : [facility] [employer]  [de-identified] : Assurity MRI [de-identified] : [id number] [de-identified] : 12/2017 [de-identified] : 50/140 [de-identified] : 3 years,  [de-identified] : AP 2%  99.98%\ no events

## 2024-09-16 NOTE — PROCEDURE
[Complete Heart Block] : complete heart block [Pacemaker] : pacemaker [DDD] : DDD [Threshold Testing Performed] : Threshold testing was performed [Lead Imp:  ___ohms] : lead impedance was [unfilled] ohms [___ ms] : [unfilled] ms [None] : none [Sensing Amplitude ___mv] : sensing amplitude was [unfilled] mv [___V @] : [unfilled] V [de-identified] : [facility] [employer]rayan  [de-identified] : Assurity MRI [de-identified] : [id number] [de-identified] : 12/2017 [de-identified] : 50/140 [de-identified] : 3 years,  [de-identified] : AP 2%  99.98%\ no events

## 2024-09-16 NOTE — PHYSICAL EXAM
[General Appearance - Well Developed] : well developed [Normal Appearance] : normal appearance [Well Groomed] : well groomed [General Appearance - Well Nourished] : well nourished [No Deformities] : no deformities [General Appearance - In No Acute Distress] : no acute distress [Heart Rate And Rhythm] : heart rate and rhythm were normal [Heart Sounds] : normal S1 and S2 [Edema] : no peripheral edema present [] : no respiratory distress [Respiration, Rhythm And Depth] : normal respiratory rhythm and effort [Exaggerated Use Of Accessory Muscles For Inspiration] : no accessory muscle use [Auscultation Breath Sounds / Voice Sounds] : lungs were clear to auscultation bilaterally [Left Infraclavicular] : left infraclavicular area [Clean] : clean [Dry] : dry [Well-Healed] : well-healed [Palpable Crepitus] : no palpable crepitus [Bleeding] : no active bleeding [Foul Odor] : no foul smell [Purulent Drainage] : no purulent drainage [Serosanguineous Drainage] : no serosanquineous drainage [Serous Drainage] : no serous drainage [Erythema] : not erythematous [Warm] : not warm [Tender] : not tender [Indurated] : not indurated [Fluctuant] : not fluctuant

## 2024-09-27 NOTE — DISCHARGE NOTE NURSING/CASE MANAGEMENT/SOCIAL WORK - NSTRANSFERBELONGINGSDISPO_GEN_A_NUR
Attending to bill Attending to bill Attending to bill with patient Attending to bill Attending to bill Attending to bill Attending to bill Attending to bill Attending to bill Attending to bill Attending to bill

## 2025-03-12 ENCOUNTER — NON-APPOINTMENT (OUTPATIENT)
Age: 75
End: 2025-03-12

## 2025-03-12 ENCOUNTER — APPOINTMENT (OUTPATIENT)
Dept: HEART AND VASCULAR | Facility: CLINIC | Age: 75
End: 2025-03-12
Payer: MEDICARE

## 2025-03-12 VITALS
HEIGHT: 69.5 IN | TEMPERATURE: 97.9 F | OXYGEN SATURATION: 95 % | WEIGHT: 157 LBS | SYSTOLIC BLOOD PRESSURE: 140 MMHG | BODY MASS INDEX: 22.73 KG/M2 | DIASTOLIC BLOOD PRESSURE: 76 MMHG | HEART RATE: 77 BPM

## 2025-03-12 DIAGNOSIS — I44.2 ATRIOVENTRICULAR BLOCK, COMPLETE: ICD-10-CM

## 2025-03-12 DIAGNOSIS — I48.0 PAROXYSMAL ATRIAL FIBRILLATION: ICD-10-CM

## 2025-03-12 PROCEDURE — 93280 PM DEVICE PROGR EVAL DUAL: CPT

## 2025-03-12 PROCEDURE — 99213 OFFICE O/P EST LOW 20 MIN: CPT | Mod: 25

## 2025-03-12 RX ORDER — DABIGATRAN ETEXILATE 150 MG/1
150 CAPSULE ORAL TWICE DAILY
Qty: 180 | Refills: 2 | Status: ACTIVE | COMMUNITY
Start: 2025-03-12 | End: 1900-01-01

## 2025-06-13 ENCOUNTER — EMERGENCY (EMERGENCY)
Facility: HOSPITAL | Age: 75
LOS: 1 days | End: 2025-06-13
Attending: EMERGENCY MEDICINE | Admitting: EMERGENCY MEDICINE
Payer: MEDICARE

## 2025-06-13 VITALS
SYSTOLIC BLOOD PRESSURE: 186 MMHG | HEART RATE: 70 BPM | WEIGHT: 160.06 LBS | TEMPERATURE: 98 F | OXYGEN SATURATION: 98 % | DIASTOLIC BLOOD PRESSURE: 89 MMHG | RESPIRATION RATE: 18 BRPM | HEIGHT: 70 IN

## 2025-06-13 VITALS
TEMPERATURE: 98 F | SYSTOLIC BLOOD PRESSURE: 144 MMHG | DIASTOLIC BLOOD PRESSURE: 79 MMHG | HEART RATE: 66 BPM | RESPIRATION RATE: 19 BRPM | OXYGEN SATURATION: 96 %

## 2025-06-13 DIAGNOSIS — Z98.890 OTHER SPECIFIED POSTPROCEDURAL STATES: Chronic | ICD-10-CM

## 2025-06-13 DIAGNOSIS — Z98.89 OTHER SPECIFIED POSTPROCEDURAL STATES: Chronic | ICD-10-CM

## 2025-06-13 LAB
ADD ON TEST-SPECIMEN IN LAB: SIGNIFICANT CHANGE UP
ANION GAP SERPL CALC-SCNC: 12 MMOL/L — SIGNIFICANT CHANGE UP (ref 5–17)
APPEARANCE UR: CLEAR — SIGNIFICANT CHANGE UP
BASOPHILS # BLD AUTO: 0.03 K/UL — SIGNIFICANT CHANGE UP (ref 0–0.2)
BASOPHILS NFR BLD AUTO: 0.3 % — SIGNIFICANT CHANGE UP (ref 0–2)
BILIRUB UR-MCNC: NEGATIVE — SIGNIFICANT CHANGE UP
BUN SERPL-MCNC: 14 MG/DL — SIGNIFICANT CHANGE UP (ref 7–23)
CALCIUM SERPL-MCNC: 10 MG/DL — SIGNIFICANT CHANGE UP (ref 8.4–10.5)
CHLORIDE SERPL-SCNC: 104 MMOL/L — SIGNIFICANT CHANGE UP (ref 96–108)
CO2 SERPL-SCNC: 26 MMOL/L — SIGNIFICANT CHANGE UP (ref 22–31)
COLOR SPEC: YELLOW — SIGNIFICANT CHANGE UP
CREAT SERPL-MCNC: 1.03 MG/DL — SIGNIFICANT CHANGE UP (ref 0.5–1.3)
DIFF PNL FLD: ABNORMAL
EGFR: 75 ML/MIN/1.73M2 — SIGNIFICANT CHANGE UP
EGFR: 75 ML/MIN/1.73M2 — SIGNIFICANT CHANGE UP
EOSINOPHIL # BLD AUTO: 0.03 K/UL — SIGNIFICANT CHANGE UP (ref 0–0.5)
EOSINOPHIL NFR BLD AUTO: 0.3 % — SIGNIFICANT CHANGE UP (ref 0–6)
GLUCOSE SERPL-MCNC: 130 MG/DL — HIGH (ref 70–99)
GLUCOSE UR QL: NEGATIVE MG/DL — SIGNIFICANT CHANGE UP
HCT VFR BLD CALC: 45.7 % — SIGNIFICANT CHANGE UP (ref 39–50)
HGB BLD-MCNC: 15.9 G/DL — SIGNIFICANT CHANGE UP (ref 13–17)
IMM GRANULOCYTES NFR BLD AUTO: 0.2 % — SIGNIFICANT CHANGE UP (ref 0–0.9)
KETONES UR QL: NEGATIVE MG/DL — SIGNIFICANT CHANGE UP
LEUKOCYTE ESTERASE UR-ACNC: NEGATIVE — SIGNIFICANT CHANGE UP
LIDOCAIN IGE QN: 21 U/L — SIGNIFICANT CHANGE UP (ref 7–60)
LYMPHOCYTES # BLD AUTO: 0.57 K/UL — LOW (ref 1–3.3)
LYMPHOCYTES # BLD AUTO: 6.1 % — LOW (ref 13–44)
MCHC RBC-ENTMCNC: 31.9 PG — SIGNIFICANT CHANGE UP (ref 27–34)
MCHC RBC-ENTMCNC: 34.8 G/DL — SIGNIFICANT CHANGE UP (ref 32–36)
MCV RBC AUTO: 91.6 FL — SIGNIFICANT CHANGE UP (ref 80–100)
MONOCYTES # BLD AUTO: 0.76 K/UL — SIGNIFICANT CHANGE UP (ref 0–0.9)
MONOCYTES NFR BLD AUTO: 8.1 % — SIGNIFICANT CHANGE UP (ref 2–14)
NEUTROPHILS # BLD AUTO: 7.94 K/UL — HIGH (ref 1.8–7.4)
NEUTROPHILS NFR BLD AUTO: 85 % — HIGH (ref 43–77)
NITRITE UR-MCNC: NEGATIVE — SIGNIFICANT CHANGE UP
NRBC BLD AUTO-RTO: 0 /100 WBCS — SIGNIFICANT CHANGE UP (ref 0–0)
PH UR: 5 — SIGNIFICANT CHANGE UP (ref 5–8)
PLATELET # BLD AUTO: 156 K/UL — SIGNIFICANT CHANGE UP (ref 150–400)
POTASSIUM SERPL-MCNC: 4.8 MMOL/L — SIGNIFICANT CHANGE UP (ref 3.5–5.3)
POTASSIUM SERPL-SCNC: 4.8 MMOL/L — SIGNIFICANT CHANGE UP (ref 3.5–5.3)
PROT UR-MCNC: 30 MG/DL
RBC # BLD: 4.99 M/UL — SIGNIFICANT CHANGE UP (ref 4.2–5.8)
RBC # FLD: 13.2 % — SIGNIFICANT CHANGE UP (ref 10.3–14.5)
SODIUM SERPL-SCNC: 142 MMOL/L — SIGNIFICANT CHANGE UP (ref 135–145)
SP GR SPEC: 1.02 — SIGNIFICANT CHANGE UP (ref 1–1.03)
UROBILINOGEN FLD QL: 0.2 MG/DL — SIGNIFICANT CHANGE UP (ref 0.2–1)
WBC # BLD: 9.35 K/UL — SIGNIFICANT CHANGE UP (ref 3.8–10.5)
WBC # FLD AUTO: 9.35 K/UL — SIGNIFICANT CHANGE UP (ref 3.8–10.5)

## 2025-06-13 PROCEDURE — 99285 EMERGENCY DEPT VISIT HI MDM: CPT | Mod: FS

## 2025-06-13 PROCEDURE — 74176 CT ABD & PELVIS W/O CONTRAST: CPT | Mod: 26

## 2025-06-13 RX ORDER — ACETAMINOPHEN 500 MG/5ML
1000 LIQUID (ML) ORAL ONCE
Refills: 0 | Status: COMPLETED | OUTPATIENT
Start: 2025-06-13 | End: 2025-06-13

## 2025-06-13 RX ORDER — TAMSULOSIN HYDROCHLORIDE 0.4 MG/1
1 CAPSULE ORAL
Qty: 10 | Refills: 0
Start: 2025-06-13 | End: 2025-06-22

## 2025-06-13 RX ORDER — KETOROLAC TROMETHAMINE 30 MG/ML
15 INJECTION, SOLUTION INTRAMUSCULAR; INTRAVENOUS ONCE
Refills: 0 | Status: DISCONTINUED | OUTPATIENT
Start: 2025-06-13 | End: 2025-06-13

## 2025-06-13 RX ORDER — METOCLOPRAMIDE HCL 10 MG
10 TABLET ORAL ONCE
Refills: 0 | Status: COMPLETED | OUTPATIENT
Start: 2025-06-13 | End: 2025-06-13

## 2025-06-13 RX ORDER — OXYCODONE HYDROCHLORIDE AND ACETAMINOPHEN 10; 325 MG/1; MG/1
1 TABLET ORAL
Qty: 10 | Refills: 0
Start: 2025-06-13 | End: 2025-06-17

## 2025-06-13 RX ORDER — TAMSULOSIN HYDROCHLORIDE 0.4 MG/1
0.4 CAPSULE ORAL AT BEDTIME
Refills: 0 | Status: DISCONTINUED | OUTPATIENT
Start: 2025-06-13 | End: 2025-06-16

## 2025-06-13 RX ORDER — ONDANSETRON HCL/PF 4 MG/2 ML
4 VIAL (ML) INJECTION ONCE
Refills: 0 | Status: DISCONTINUED | OUTPATIENT
Start: 2025-06-13 | End: 2025-06-13

## 2025-06-13 RX ADMIN — TAMSULOSIN HYDROCHLORIDE 0.4 MILLIGRAM(S): 0.4 CAPSULE ORAL at 03:58

## 2025-06-13 RX ADMIN — KETOROLAC TROMETHAMINE 15 MILLIGRAM(S): 30 INJECTION, SOLUTION INTRAMUSCULAR; INTRAVENOUS at 01:49

## 2025-06-13 RX ADMIN — Medication 400 MILLIGRAM(S): at 01:03

## 2025-06-13 RX ADMIN — Medication 20 MILLIGRAM(S): at 01:03

## 2025-06-13 RX ADMIN — Medication 4 MILLIGRAM(S): at 01:49

## 2025-06-13 RX ADMIN — Medication 1000 MILLILITER(S): at 01:02

## 2025-06-13 RX ADMIN — Medication 104 MILLIGRAM(S): at 01:03

## 2025-06-13 NOTE — ED PROVIDER NOTE - PROGRESS NOTE DETAILS
results of labs and CT discussed with pt. No leukocytosis, no UTI, normal BUN/CR. CT done- 4 mm left distal ureter stone, mild left hydronephrosis.  pt states his pain is controlled now, seeking discharge home. pt has a urologist for f/u. returned precautions discussed.

## 2025-06-13 NOTE — ED PROVIDER NOTE - OBJECTIVE STATEMENT
75 yo male with h/o pacemaker ( 2027), paroxysmal afib, on xarelto, IBS, present to Er c/o sudden onset of severe left flank pain radiating  to his abdomen on the left. Pt states symptoms started while he was on the plane, back from [place] to [place]. Pt states he became very nauseous and vomit a few times during the flight. Pt also reports he was given medications for pain by a doctor who happened to be on that flight.  As soon as pt got home tonight pain started again. Pt took zofran and Percocet  and came to ER. denies hematuria, dysuria, diarrhea, constipations. Pt c/o frequent urinations  for the past few days and abdominal distention. No fever or chills, no chest pain, SOB.

## 2025-06-13 NOTE — ED ADULT TRIAGE NOTE - HEIGHT IN FEET
5 Do Not Use If Visit Has Modifier 25 And Other Service Is Not A Preventive Service, Immunization, Or Annual Wellness Visit: : Visit complexity inherent to evaluation and management associated with medical care services that serve as the continuing focal point for all needed health care services and/or with medical care services that are part of ongoing care related to a patient’s single, serious, or complex chronic condition Detail Level: Simple Indication: Provided medical care services as part of ongoing care related to the patient's single, serious or complex chronic condition.

## 2025-06-13 NOTE — ED ADULT TRIAGE NOTE - CHIEF COMPLAINT QUOTE
pt came in  with c/o abdominal pain with vomiting, nausea. pain radiates to Left back area. noted distended abdomen. pt states he came back from a flight today. +pacemaker.

## 2025-06-13 NOTE — ED PROVIDER NOTE - PATIENT PORTAL LINK FT
You can access the FollowMyHealth Patient Portal offered by [facility] by registering at the following website: [email or web address]. By joining [facility]’s FollowMyHealth portal, you will also be able to view your health information using other applications (apps) compatible with our system.

## 2025-06-13 NOTE — ED PROVIDER NOTE - CLINICAL SUMMARY MEDICAL DECISION MAKING FREE TEXT BOX
75 yo male with h/o pacemaker ( 2027), paroxysmal afib, on xarelto, IBS, present to Er c/o sudden onset of severe left flank pain radiating  to his abdomen on the left. Pt states symptoms started while he was on the plane, back from [place] to [place]. Pt states he became very nauseous and vomit a few times during the flight. Pt also reports he was given medications for pain by a doctor who happened to be on that flight.  As soon as pt got home tonight pain started again. Pt took zofran and Percocet  and came to ER. denies hematuria, dysuria, diarrhea, constipations. Pt c/o frequent urinations  for the past few days and abdominal distention. No fever or chills, no chest pain, SOB.  pt afebrile, appears in pain. exam findings highly suspicious for kidney stone.plan for labs and CT, hydration , pain control.

## 2025-06-13 NOTE — ED PROVIDER NOTE - ATTENDING APP SHARED VISIT CONTRIBUTION OF CARE
74M hx high chol, chb (s/o pacemaker, on pradaxa), c/o abd pain. states starte today, left sided, radiates to left back. +nausea/vomiting. no fevers. took zofran prior to arrival.   gen- nad  heent- ncat  cv -rrr  lungs -ctab  abd - soft , L sided ttp  ext -wwp  neuro -aox3, steady gait, solis  L sided abd pain, labs checked, pending CT, possible kidney stone

## 2025-06-13 NOTE — ED ADULT NURSE NOTE - OBJECTIVE STATEMENT
74y male presents to ED c/o flank pain. Pt states he was on a flight and had onset of left flank pain that radiated to the abdomen, along with nausea and vomiting. Pt also endorses feeling bloated with distended abdomen. Nurse on flight gave pt 8mg zofran and tylenol. Wife gave pt an additional 4mg zofran 1 hour pta. Pt has hx of pacemaker, kidney stones. Denies dysuria, hematuria. A&Ox4.

## 2025-06-13 NOTE — ED PROVIDER NOTE - NSFOLLOWUPINSTRUCTIONS_ED_ALL_ED_FT
Please take all of your medications as prescribed and follow up with your urologist for re-evaluation and further treatment.   Come back to ER if you develop fever, chills, bloody urine, worsening of pain and /or persistent nausea, vomiting and inability to take medications.     Kidney Stones    WHAT YOU NEED TO KNOW:    What is a kidney stone? Kidney stones form in the urinary system when the water and waste in your urine are out of balance. When this happens, certain types of waste crystals separate from the urine. The crystals build up and form kidney stones. Kidney stones can be made of uric acid, calcium, phosphate, or oxalate crystals. You may have more than one kidney stone.  Kidney Stones     What increases my risk for kidney stones?    Not drinking enough liquids (especially water) each day    Having urinary tract infections often    Too much of certain foods, such as meat, salt, nuts, and chocolate    Obesity    Certain medicines, such as diuretics, steroids, and antacids    A family history of kidney stones    Being born with a kidney or bowel disorder  What are the signs and symptoms of kidney stones?    Pain in the middle of your back that moves across to your side or that may spread to your groin    Nausea and vomiting    Urge to urinate often, burning feeling when you urinate, or pink or red urine    Tenderness in your lower back, side, or stomach  How are kidney stones diagnosed? Your healthcare provider will ask about your health and usual foods. He or she may refer you to a urologist. You may need tests to find out what type of kidney stones you have. Tests can show the size of your kidney stones and where they are in your urinary system. You may need more than one of the following:    Urine tests may show if you have blood in your urine. They may also show high amounts of the substances that form kidney stones, such as uric acid.    Blood tests show how well your kidneys are working. They may also be used to check the levels of calcium or uric acid in your blood.    X-ray or ultrasound pictures may be taken of your kidneys, bladder, and ureters. You may be given contrast liquid before an x-ray to help these show up better in the pictures. You may need to have more than one x-ray. Tell the healthcare provider if you have ever had an allergic reaction to contrast liquid.  How are kidney stones treated?    Medicines may be used to prevent or relieve pain or to balance your electrolytes.    A procedure or surgery to remove the kidney stones may be needed if they do not pass on their own. Your treatment will depend on the size and location of your kidney stones.  What can I do to manage kidney stones?    Drink more liquids. Your healthcare provider may tell you to drink at least 8 to 12 (eight-ounce) cups of liquids each day. This helps flush out the kidney stones when you urinate. Water is the best liquid to drink.    Strain your urine every time you go to the bathroom. Urinate through a strainer or a piece of thin cloth to catch the stones. Take the stones to your healthcare provider so they can be sent to the lab for tests. This will help your healthcare providers plan the best treatment for you.  Look for Stones in the Filter      Ask if you should avoid any foods. You may need to limit oxalate. Oxalate is a chemical found in some plant foods. The most common type of kidney stone is made up of crystals that contain calcium and oxalate. Your healthcare provider or dietitian may recommend that you limit oxalate if you get this type of kidney stone often. You may need to limit how much sodium (salt) or protein you eat. Ask for information about the best foods for you.  High Oxalate Foods      Be physically active as directed. Your stones may pass more easily if you stay active. Physical activity can also help you manage your weight. Ask about the best activities for you.   Family Walking for Exercise  When should I seek immediate care?    You are vomiting and it is not relieved with medicine.    When should I call my doctor?    You have a fever.    You have trouble urinating.    You see blood in your urine.    You have severe pain.    You have any questions or concerns about your condition or care.        Dietary Guidelines to Help Prevent Kidney Stones  Kidney stones are deposits of minerals and salts that form inside your kidneys. Your risk of developing kidney stones may be greater depending on your diet, your lifestyle, the medicines you take, and whether you have certain medical conditions. Most people can lower their risks of developing kidney stones by following these dietary guidelines. Your dietitian may give you more specific instructions depending on your overall health and the type of kidney stones you tend to develop.    What are tips for following this plan?  Reading food labels    A sampling of foods that are high in calcium.  Choose foods with "no salt added" or "low-salt" labels. Limit your salt (sodium) intake to less than 1,500 mg a day.  Choose foods with calcium for each meal and snack. Try to eat about 300 mg of calcium at each meal. Foods that contain 200–500 mg of calcium a serving include:  8 oz (237 mL) of milk, calcium-fortifiednon-dairy milk, and calcium-fortifiedfruit juice. Calcium-fortified means that calcium has been added to these drinks.  8 oz (237 mL) of kefir, yogurt, and soy yogurt.  4 oz (114 g) of tofu.  1 oz (28 g) of cheese.  1 cup (150 g) of dried figs.  1 cup (91 g) of cooked broccoli.  One 3 oz (85 g) can of sardines or mackerel.  Most people need 1,000–1,500 mg of calcium a day. Talk to your dietitian about how much calcium is recommended for you.    Shopping    Buy plenty of fresh fruits and vegetables. Most people do not need to avoid fruits and vegetables, even if these foods contain nutrients that may contribute to kidney stones.  When shopping for convenience foods, choose:  Whole pieces of fruit.  Pre-made salads with dressing on the side.  Low-fat fruit and yogurt smoothies.  Avoid buying frozen meals or prepared deli foods. These can be high in sodium.  Look for foods with live cultures, such as yogurt and kefir.  Choose high-fiber grains, such as whole-wheat breads, oat bran, and wheat cereals.  Cooking    Do not add salt to food when cooking. Place a salt shaker on the table and allow each person to add their own salt to taste.  Use vegetable protein, such as beans, textured vegetable protein (TVP), or tofu, instead of meat in pasta, casseroles, and soups.  Meal planning    Eat less salt, if told by your dietitian. To do this:  Avoid eating processed or pre-made food.  Avoid eating fast food.  Eat less animal protein, including cheese, meat, poultry, or fish, if told by your dietitian. To do this:  Limit the number of times you have meat, poultry, fish, or cheese each week. Eat a diet free of meat at least 2 days a week.  Eat only one serving each day of meat, poultry, fish, or seafood.  When you prepare animal proteins, cut pieces into small portion sizes. For most meat and fish, one serving is about the size of the palm of your hand.  Eat at least five servings of fresh fruits and vegetables each day. To do this:  Keep fruits and vegetables on hand for snacks.  Eat one piece of fruit or a handful of berries with breakfast.  Have a salad and fruit at lunch.  Have two kinds of vegetables at dinner.  You may be told to limit foods that are high in a substance called oxalate. These include:  Spinach (cooked), rhubarb, beets, sweet potatoes, and Swiss chard.  Peanuts.  Potato chips, french fries, and baked potatoes with skin on.  Nuts and nut products.  Chocolate.  If you regularly take a diuretic medicine, make sure to eat at least 1 or 2 servings of fruits or vegetables that are high in potassium each day. These include:  Avocado.  Banana.  Orange, prune, carrot, or tomato juice.  Baked potato.  Cabbage.  Beans and split peas.  Lifestyle    A comparison of three sample cups showing dark yellow, yellow, and pale yellow urine.  Drink enough fluid to keep your urine pale yellow. This is the most important thing you can do. Spread your fluid intake throughout the day.  If you drink alcohol:  Limit how much you have to:  0–1 drink a day for women who are not pregnant.  0–2 drinks a day for men.  Know how much alcohol is in your drink. In the [place] one drink equals one 12 oz bottle of beer (355 mL), one 5 oz glass of wine (148 mL), or one 1½ oz glass of hard liquor (44 mL).  Lose weight if told by your health care provider. Work with your dietitian to find an eating plan and weight loss strategies that work best for you.  General information    Talk to your health care provider and dietitian about taking daily supplements. Depending on your health and the cause of your kidney stones, you may be told:  Do not take high-dose supplements of vitamin C (1,000 mg a day or more).  To take a calcium supplement.  To take a daily probiotic supplement.  To take other supplements such as magnesium, fish oil, or vitamin B6.  Take over-the-counter and prescription medicines only as told by your health care provider. These include supplements.  What foods should I limit?  Limit your intake of the following foods, or eat them as told by your dietitian.    Vegetables    Spinach. Rhubarb. Beets. Canned vegetables. Pickles. Olives. Baked potatoes with skin.    Grains    Wheat bran. Baked goods. Salted crackers. Cereals high in sugar.    Meats and other proteins    Nuts. Nut butters. Large portions of meat, poultry, or fish. Salted, precooked, or cured meats, such as sausages, meat loaves, and hot dogs.    Dairy    Cheeses.    Beverages    Regular soft drinks. Regular vegetable juice.    Seasonings and condiments    Seasoning blends with salt. Salad dressings. Soy sauce. Ketchup. Barbecue sauce.    Other foods    Canned soups. Canned pasta sauce. Casseroles. Pizza. Lasagna. Frozen meals. Potato chips. French fries.    The items listed above may not be a complete list of foods and beverages you should limit. Contact a dietitian for more information.    What foods should I avoid?  Talk to your dietitian about specific foods you should avoid based on the type of kidney stones you have and your overall health.    Fruits    Grapefruit.    The item listed above may not be a complete list of foods and beverages you should avoid. Contact a dietitian for more information.    Summary  Kidney stones are deposits of minerals and salts that form inside your kidneys.  You can lower your risk of kidney stones by making changes to your diet.  The most important thing you can do is drink enough fluid. Drink enough fluid to keep your urine pale yellow.  Talk to your dietitian about how much calcium you should have each day, and eat less salt and animal protein as told by your dietitian.  This information is not intended to replace advice given to you by your health care provider. Make sure you discuss any questions you have with your health care provider.

## 2025-06-14 ENCOUNTER — INPATIENT (INPATIENT)
Facility: HOSPITAL | Age: 75
LOS: 0 days | Discharge: HOME | DRG: 694 | End: 2025-06-15
Attending: STUDENT IN AN ORGANIZED HEALTH CARE EDUCATION/TRAINING PROGRAM | Admitting: STUDENT IN AN ORGANIZED HEALTH CARE EDUCATION/TRAINING PROGRAM
Payer: MEDICARE

## 2025-06-14 VITALS
HEIGHT: 70 IN | WEIGHT: 160.06 LBS | RESPIRATION RATE: 19 BRPM | TEMPERATURE: 98 F | DIASTOLIC BLOOD PRESSURE: 52 MMHG | SYSTOLIC BLOOD PRESSURE: 189 MMHG | HEART RATE: 86 BPM | OXYGEN SATURATION: 99 %

## 2025-06-14 DIAGNOSIS — N20.1 CALCULUS OF URETER: ICD-10-CM

## 2025-06-14 DIAGNOSIS — Z98.890 OTHER SPECIFIED POSTPROCEDURAL STATES: Chronic | ICD-10-CM

## 2025-06-14 DIAGNOSIS — E78.5 HYPERLIPIDEMIA, UNSPECIFIED: ICD-10-CM

## 2025-06-14 DIAGNOSIS — N39.0 URINARY TRACT INFECTION, SITE NOT SPECIFIED: ICD-10-CM

## 2025-06-14 DIAGNOSIS — I48.20 CHRONIC ATRIAL FIBRILLATION, UNSPECIFIED: ICD-10-CM

## 2025-06-14 DIAGNOSIS — Z98.89 OTHER SPECIFIED POSTPROCEDURAL STATES: Chronic | ICD-10-CM

## 2025-06-14 DIAGNOSIS — N17.9 ACUTE KIDNEY FAILURE, UNSPECIFIED: ICD-10-CM

## 2025-06-14 LAB
ANION GAP SERPL CALC-SCNC: 10 MMOL/L — SIGNIFICANT CHANGE UP (ref 5–17)
APPEARANCE UR: ABNORMAL
APTT BLD: 32.4 SEC — SIGNIFICANT CHANGE UP (ref 26.1–36.8)
BASOPHILS # BLD AUTO: 0.02 K/UL — SIGNIFICANT CHANGE UP (ref 0–0.2)
BASOPHILS NFR BLD AUTO: 0.3 % — SIGNIFICANT CHANGE UP (ref 0–2)
BILIRUB UR-MCNC: ABNORMAL
BLD GP AB SCN SERPL QL: NEGATIVE — SIGNIFICANT CHANGE UP
BUN SERPL-MCNC: 18 MG/DL — SIGNIFICANT CHANGE UP (ref 7–23)
CALCIUM SERPL-MCNC: 9.4 MG/DL — SIGNIFICANT CHANGE UP (ref 8.4–10.5)
CHLORIDE SERPL-SCNC: 101 MMOL/L — SIGNIFICANT CHANGE UP (ref 96–108)
CO2 SERPL-SCNC: 25 MMOL/L — SIGNIFICANT CHANGE UP (ref 22–31)
COLOR SPEC: ABNORMAL
CREAT SERPL-MCNC: 1.35 MG/DL — HIGH (ref 0.5–1.3)
DIFF PNL FLD: ABNORMAL
EGFR: 54 ML/MIN/1.73M2 — LOW
EGFR: 54 ML/MIN/1.73M2 — LOW
EOSINOPHIL # BLD AUTO: 0.12 K/UL — SIGNIFICANT CHANGE UP (ref 0–0.5)
EOSINOPHIL NFR BLD AUTO: 1.9 % — SIGNIFICANT CHANGE UP (ref 0–6)
GLUCOSE SERPL-MCNC: 143 MG/DL — HIGH (ref 70–99)
GLUCOSE UR QL: NEGATIVE MG/DL — SIGNIFICANT CHANGE UP
HCT VFR BLD CALC: 40.2 % — SIGNIFICANT CHANGE UP (ref 39–50)
HGB BLD-MCNC: 13.8 G/DL — SIGNIFICANT CHANGE UP (ref 13–17)
IMM GRANULOCYTES NFR BLD AUTO: 0.5 % — SIGNIFICANT CHANGE UP (ref 0–0.9)
INR BLD: 1.26 — HIGH (ref 0.85–1.16)
KETONES UR QL: NEGATIVE MG/DL — SIGNIFICANT CHANGE UP
LEUKOCYTE ESTERASE UR-ACNC: ABNORMAL
LYMPHOCYTES # BLD AUTO: 0.76 K/UL — LOW (ref 1–3.3)
LYMPHOCYTES # BLD AUTO: 12.2 % — LOW (ref 13–44)
MCHC RBC-ENTMCNC: 32.1 PG — SIGNIFICANT CHANGE UP (ref 27–34)
MCHC RBC-ENTMCNC: 34.3 G/DL — SIGNIFICANT CHANGE UP (ref 32–36)
MCV RBC AUTO: 93.5 FL — SIGNIFICANT CHANGE UP (ref 80–100)
MONOCYTES # BLD AUTO: 0.89 K/UL — SIGNIFICANT CHANGE UP (ref 0–0.9)
MONOCYTES NFR BLD AUTO: 14.3 % — HIGH (ref 2–14)
NEUTROPHILS # BLD AUTO: 4.42 K/UL — SIGNIFICANT CHANGE UP (ref 1.8–7.4)
NEUTROPHILS NFR BLD AUTO: 70.8 % — SIGNIFICANT CHANGE UP (ref 43–77)
NITRITE UR-MCNC: POSITIVE
NRBC BLD AUTO-RTO: 0 /100 WBCS — SIGNIFICANT CHANGE UP (ref 0–0)
PH UR: 5 — SIGNIFICANT CHANGE UP (ref 5–8)
PLATELET # BLD AUTO: 118 K/UL — LOW (ref 150–400)
POTASSIUM SERPL-MCNC: 4 MMOL/L — SIGNIFICANT CHANGE UP (ref 3.5–5.3)
POTASSIUM SERPL-SCNC: 4 MMOL/L — SIGNIFICANT CHANGE UP (ref 3.5–5.3)
PROT UR-MCNC: 100 MG/DL
PROTHROM AB SERPL-ACNC: 14.5 SEC — HIGH (ref 9.9–13.4)
RBC # BLD: 4.3 M/UL — SIGNIFICANT CHANGE UP (ref 4.2–5.8)
RBC # FLD: 13.2 % — SIGNIFICANT CHANGE UP (ref 10.3–14.5)
RH IG SCN BLD-IMP: POSITIVE — SIGNIFICANT CHANGE UP
SODIUM SERPL-SCNC: 136 MMOL/L — SIGNIFICANT CHANGE UP (ref 135–145)
SP GR SPEC: 1.01 — SIGNIFICANT CHANGE UP (ref 1–1.03)
UROBILINOGEN FLD QL: 0.2 MG/DL — SIGNIFICANT CHANGE UP (ref 0.2–1)
WBC # BLD: 6.24 K/UL — SIGNIFICANT CHANGE UP (ref 3.8–10.5)
WBC # FLD AUTO: 6.24 K/UL — SIGNIFICANT CHANGE UP (ref 3.8–10.5)

## 2025-06-14 PROCEDURE — 99285 EMERGENCY DEPT VISIT HI MDM: CPT

## 2025-06-14 PROCEDURE — 99223 1ST HOSP IP/OBS HIGH 75: CPT

## 2025-06-14 PROCEDURE — 93010 ELECTROCARDIOGRAM REPORT: CPT

## 2025-06-14 RX ORDER — CEFTRIAXONE 500 MG/1
2000 INJECTION, POWDER, FOR SOLUTION INTRAMUSCULAR; INTRAVENOUS ONCE
Refills: 0 | Status: COMPLETED | OUTPATIENT
Start: 2025-06-14 | End: 2025-06-14

## 2025-06-14 RX ORDER — DABIGATRAN ETEXILATE MESYLATE 30 MG/1
1 PELLET ORAL
Refills: 0 | DISCHARGE

## 2025-06-14 RX ORDER — KETOROLAC TROMETHAMINE 30 MG/ML
15 INJECTION, SOLUTION INTRAMUSCULAR; INTRAVENOUS ONCE
Refills: 0 | Status: DISCONTINUED | OUTPATIENT
Start: 2025-06-14 | End: 2025-06-14

## 2025-06-14 RX ORDER — OXYCODONE HYDROCHLORIDE 30 MG/1
2.5 TABLET ORAL EVERY 4 HOURS
Refills: 0 | Status: DISCONTINUED | OUTPATIENT
Start: 2025-06-14 | End: 2025-06-15

## 2025-06-14 RX ORDER — DABIGATRAN ETEXILATE MESYLATE 30 MG/1
150 PELLET ORAL EVERY 12 HOURS
Refills: 0 | Status: DISCONTINUED | OUTPATIENT
Start: 2025-06-14 | End: 2025-06-15

## 2025-06-14 RX ORDER — ACETAMINOPHEN 500 MG/5ML
975 LIQUID (ML) ORAL EVERY 6 HOURS
Refills: 0 | Status: DISCONTINUED | OUTPATIENT
Start: 2025-06-14 | End: 2025-06-15

## 2025-06-14 RX ORDER — HYDROMORPHONE/SOD CHLOR,ISO/PF 2 MG/10 ML
0.5 SYRINGE (ML) INJECTION EVERY 6 HOURS
Refills: 0 | Status: DISCONTINUED | OUTPATIENT
Start: 2025-06-14 | End: 2025-06-15

## 2025-06-14 RX ORDER — DABIGATRAN ETEXILATE MESYLATE 30 MG/1
170 PELLET ORAL
Refills: 0 | DISCHARGE

## 2025-06-14 RX ORDER — CEFTRIAXONE 500 MG/1
1000 INJECTION, POWDER, FOR SOLUTION INTRAMUSCULAR; INTRAVENOUS EVERY 24 HOURS
Refills: 0 | Status: DISCONTINUED | OUTPATIENT
Start: 2025-06-15 | End: 2025-06-15

## 2025-06-14 RX ORDER — OXYCODONE HYDROCHLORIDE 30 MG/1
5 TABLET ORAL EVERY 4 HOURS
Refills: 0 | Status: DISCONTINUED | OUTPATIENT
Start: 2025-06-14 | End: 2025-06-15

## 2025-06-14 RX ORDER — ROSUVASTATIN CALCIUM 20 MG/1
5 TABLET, FILM COATED ORAL AT BEDTIME
Refills: 0 | Status: DISCONTINUED | OUTPATIENT
Start: 2025-06-14 | End: 2025-06-15

## 2025-06-14 RX ORDER — SENNA 187 MG
2 TABLET ORAL AT BEDTIME
Refills: 0 | Status: DISCONTINUED | OUTPATIENT
Start: 2025-06-14 | End: 2025-06-15

## 2025-06-14 RX ORDER — ONDANSETRON HCL/PF 4 MG/2 ML
4 VIAL (ML) INJECTION EVERY 6 HOURS
Refills: 0 | Status: DISCONTINUED | OUTPATIENT
Start: 2025-06-14 | End: 2025-06-15

## 2025-06-14 RX ORDER — TAMSULOSIN HYDROCHLORIDE 0.4 MG/1
0.4 CAPSULE ORAL AT BEDTIME
Refills: 0 | Status: DISCONTINUED | OUTPATIENT
Start: 2025-06-15 | End: 2025-06-15

## 2025-06-14 RX ADMIN — Medication 4 MILLIGRAM(S): at 21:18

## 2025-06-14 RX ADMIN — KETOROLAC TROMETHAMINE 15 MILLIGRAM(S): 30 INJECTION, SOLUTION INTRAMUSCULAR; INTRAVENOUS at 21:03

## 2025-06-14 RX ADMIN — KETOROLAC TROMETHAMINE 15 MILLIGRAM(S): 30 INJECTION, SOLUTION INTRAMUSCULAR; INTRAVENOUS at 21:18

## 2025-06-14 RX ADMIN — Medication 4 MILLIGRAM(S): at 21:04

## 2025-06-14 RX ADMIN — CEFTRIAXONE 100 MILLIGRAM(S): 500 INJECTION, POWDER, FOR SOLUTION INTRAMUSCULAR; INTRAVENOUS at 21:56

## 2025-06-14 RX ADMIN — CEFTRIAXONE 2000 MILLIGRAM(S): 500 INJECTION, POWDER, FOR SOLUTION INTRAMUSCULAR; INTRAVENOUS at 22:26

## 2025-06-14 NOTE — H&P ADULT - NSHPLABSRESULTS_GEN_ALL_CORE
13.8   6.24  )-----------( 118      ( 2025 20:48 )             40.2     06-14    136  |  101  |  18  ----------------------------<  143[H]  4.0   |  25  |  1.35[H]    Ca    9.4      2025 20:48    TPro  7.9  /  Alb  4.8  /  TBili  0.9  /  DBili  0.3  /  AST  27  /  ALT  22  /  AlkPhos  88  06-13    Urinalysis Basic - ( 2025 20:48 )    Color: Red / Appearance: Turbid / S.015 / pH: x  Gluc: 143 mg/dL / Ketone: x  / Bili: Small / Urobili: 0.2 mg/dL   Blood: x / Protein: 100 mg/dL / Nitrite: Positive   Leuk Esterase: Large / RBC: >1900 /HPF / WBC 89 /HPF   Sq Epi: x / Non Sq Epi: 6 /HPF / Bacteria: Negative /HPF    CT : IMPRESSION:  A 4 mm distal left ureteral stone with trace hydroureteronephrosis.

## 2025-06-14 NOTE — H&P ADULT - NSHPPHYSICALEXAM_GEN_ALL_CORE
Vital Signs Last 24 Hrs  T(C): 36.7 (14 Jun 2025 22:26), Max: 36.9 (14 Jun 2025 20:37)  T(F): 98 (14 Jun 2025 22:26), Max: 98.4 (14 Jun 2025 20:37)  HR: 69 (14 Jun 2025 22:26) (69 - 86)  BP: 152/71 (14 Jun 2025 22:26) (152/71 - 189/52)  BP(mean): --  RR: 18 (14 Jun 2025 22:26) (18 - 19)  SpO2: 94% (14 Jun 2025 22:26) (94% - 99%)    Parameters below as of 14 Jun 2025 22:26  Patient On (Oxygen Delivery Method): room air    Gen: AAOx3, NAD  HENT: NC/AT, PERRL, slight jaundice, neck soft, flat, prior trach scar, good ROM  Resp: no stridor or wheezing  Abd: soft, NT/ND, no guarding or rigidity  Back: no CVAT b/l  : voiding

## 2025-06-14 NOTE — ED PROVIDER NOTE - CLINICAL SUMMARY MEDICAL DECISION MAKING FREE TEXT BOX
75 yo M PMH afib (xarelto), pacemaker, IBS with known left renal stone from visit 6/13.  Pain has been manageable but started to worsen gradually earlier today becoming severe pta.  No: fever, nvd, dysuria.  Urine is dark.     DDx:  Known renal stone.  Expected pain vs. worsening obstruction or infection.  will check labs, ua.     21:15:  Case d/w 73 yo M PMH afib (xarelto), pacemaker, IBS with known left renal stone from visit 6/13.  Pain has been manageable but started to worsen gradually earlier today becoming severe pta.  No: fever, nvd, dysuria.  Urine is dark.     DDx:  Known renal stone.  Expected pain vs. worsening obstruction or infection.  will check labs, ua.     21:15:  Case d/w Urology who will see patient.   23:00:  Urology team accepts for admissin.

## 2025-06-14 NOTE — H&P ADULT - NSICDXPASTSURGICALHX_GEN_ALL_CORE_FT
PAST SURGICAL HISTORY:  H/O hernia repair     History of surgery PPM    History of surgery tracheostomy    History of surgery rt shoulder    S/P right knee surgery

## 2025-06-14 NOTE — ED PROVIDER NOTE - OBJECTIVE STATEMENT
73 yo M PMH afib (xarelto), pacemaker, IBS with known left renal stone from visit 6/13.  Pain has been manageable but started to worsen gradually earlier today becoming severe pta.  No: fever, nvd,   · 73 yo M PMH afib (xarelto), pacemaker, IBS with known left renal stone from visit 6/13.  Pain has been manageable but started to worsen gradually earlier today becoming severe pta.  No: fever, nvd, dysuria.  Urine is dark.

## 2025-06-14 NOTE — ED ADULT NURSE NOTE - NSFALLUNIVINTERV_ED_ALL_ED
Bed/Stretcher in lowest position, wheels locked, appropriate side rails in place/Call bell, personal items and telephone in reach/Instruct patient to call for assistance before getting out of bed/chair/stretcher/Non-slip footwear applied when patient is off stretcher/[place] to call system/Physically safe environment - no spills, clutter or unnecessary equipment/Purposeful proactive rounding/Room/bathroom lighting operational, light cord in reach

## 2025-06-14 NOTE — ED ADULT NURSE NOTE - NS ED NOTE  TALK SOMEONE YN
[facility]/Population Health Chart Review & Patient Outreach Details For Medication Adherence Project    Reason for Outreach Encounter: 3rd Party payor non-compliance report ([employer], [employer], [employer], etc)  2.  Patient Outreach Method: Reviewed patient chart   3.   Medication in question:    Hyperlipidemia Medications              rosuvastatin (CRESTOR) 10 MG tablet TAKE 1 TABLET BY MOUTH EVERY DAY                  crestor  last filled  3/6 for 90 day supply      4.  Reviewed and or Updates Made To: Patient Chart  5. Outreach Outcomes and/or actions taken: Patient filled medication and is on track to be adherent  Additional Notes:       
No

## 2025-06-14 NOTE — H&P ADULT - NSICDXPASTMEDICALHX_GEN_ALL_CORE_FT
PAST MEDICAL HISTORY:  Chronic atrial fibrillation     History of pacemaker     HLD (hyperlipidemia)     Nephrolithiasis

## 2025-06-14 NOTE — H&P ADULT - HISTORY OF PRESENT ILLNESS
74 y.o. M patient presenting to ER with severe, 10/10 left lower back pain radiating to abdomen.  He was seen 2 days ago for similar complaint and found to have a left 4mm distal ureteral stone.  No fevers or chill.  No dysuria.  Mild hematuria (described urine as yellow with small strands blood).  Prior history of stones. 74 y.o. M patient presenting to ER with severe, 10/10 left lower back pain radiating to abdomen.  He was seen 2 days ago for similar complaint and found to have a left 4mm distal ureteral stone.  No fevers or chill.  No dysuria.  Mild hematuria (described urine as yellow with small strands blood).  Prior history of stones.    Patient reports having seen different urologists, including Dr Miller and Cj, but does not actively follow one now. 74 y.o. M patient presenting to ER with severe, 10/10 left lower back pain radiating to abdomen.  He was seen 2 days ago for similar complaint and found to have a left 4mm distal ureteral stone.  No fevers or chill.  No dysuria.  Mild hematuria (described urine as yellow with small strands blood).  Prior history of stones.    Patient reports having seen different urologists, was a patient of Dr. Bryson Naylor before he retired. Had seen Dr. Candace Miller once however now follows someone from [facility]

## 2025-06-15 VITALS
TEMPERATURE: 98 F | SYSTOLIC BLOOD PRESSURE: 157 MMHG | RESPIRATION RATE: 16 BRPM | DIASTOLIC BLOOD PRESSURE: 81 MMHG | HEART RATE: 80 BPM | OXYGEN SATURATION: 94 %

## 2025-06-15 LAB
ANION GAP SERPL CALC-SCNC: 8 MMOL/L — SIGNIFICANT CHANGE UP (ref 5–17)
BUN SERPL-MCNC: 14 MG/DL — SIGNIFICANT CHANGE UP (ref 7–23)
CALCIUM SERPL-MCNC: 8.4 MG/DL — SIGNIFICANT CHANGE UP (ref 8.4–10.5)
CHLORIDE SERPL-SCNC: 105 MMOL/L — SIGNIFICANT CHANGE UP (ref 96–108)
CO2 SERPL-SCNC: 26 MMOL/L — SIGNIFICANT CHANGE UP (ref 22–31)
CREAT SERPL-MCNC: 0.93 MG/DL — SIGNIFICANT CHANGE UP (ref 0.5–1.3)
EGFR: 85 ML/MIN/1.73M2 — SIGNIFICANT CHANGE UP
EGFR: 85 ML/MIN/1.73M2 — SIGNIFICANT CHANGE UP
GLUCOSE SERPL-MCNC: 102 MG/DL — HIGH (ref 70–99)
HCT VFR BLD CALC: 36.3 % — LOW (ref 39–50)
HGB BLD-MCNC: 12 G/DL — LOW (ref 13–17)
MCHC RBC-ENTMCNC: 31.3 PG — SIGNIFICANT CHANGE UP (ref 27–34)
MCHC RBC-ENTMCNC: 33.1 G/DL — SIGNIFICANT CHANGE UP (ref 32–36)
MCV RBC AUTO: 94.8 FL — SIGNIFICANT CHANGE UP (ref 80–100)
NRBC BLD AUTO-RTO: 0 /100 WBCS — SIGNIFICANT CHANGE UP (ref 0–0)
PLATELET # BLD AUTO: 95 K/UL — LOW (ref 150–400)
POTASSIUM SERPL-MCNC: 3.8 MMOL/L — SIGNIFICANT CHANGE UP (ref 3.5–5.3)
POTASSIUM SERPL-SCNC: 3.8 MMOL/L — SIGNIFICANT CHANGE UP (ref 3.5–5.3)
RBC # BLD: 3.83 M/UL — LOW (ref 4.2–5.8)
RBC # FLD: 12.9 % — SIGNIFICANT CHANGE UP (ref 10.3–14.5)
SODIUM SERPL-SCNC: 139 MMOL/L — SIGNIFICANT CHANGE UP (ref 135–145)
WBC # BLD: 3.9 K/UL — SIGNIFICANT CHANGE UP (ref 3.8–10.5)
WBC # FLD AUTO: 3.9 K/UL — SIGNIFICANT CHANGE UP (ref 3.8–10.5)

## 2025-06-15 PROCEDURE — 86901 BLOOD TYPING SEROLOGIC RH(D): CPT

## 2025-06-15 PROCEDURE — 85027 COMPLETE CBC AUTOMATED: CPT

## 2025-06-15 PROCEDURE — 93005 ELECTROCARDIOGRAM TRACING: CPT

## 2025-06-15 PROCEDURE — 80076 HEPATIC FUNCTION PANEL: CPT

## 2025-06-15 PROCEDURE — 85610 PROTHROMBIN TIME: CPT

## 2025-06-15 PROCEDURE — 86900 BLOOD TYPING SEROLOGIC ABO: CPT

## 2025-06-15 PROCEDURE — 99232 SBSQ HOSP IP/OBS MODERATE 35: CPT

## 2025-06-15 PROCEDURE — 72192 CT PELVIS W/O DYE: CPT | Mod: 26

## 2025-06-15 PROCEDURE — 96375 TX/PRO/DX INJ NEW DRUG ADDON: CPT

## 2025-06-15 PROCEDURE — 85730 THROMBOPLASTIN TIME PARTIAL: CPT

## 2025-06-15 PROCEDURE — 96365 THER/PROPH/DIAG IV INF INIT: CPT

## 2025-06-15 PROCEDURE — 86850 RBC ANTIBODY SCREEN: CPT

## 2025-06-15 PROCEDURE — 71045 X-RAY EXAM CHEST 1 VIEW: CPT

## 2025-06-15 PROCEDURE — 99285 EMERGENCY DEPT VISIT HI MDM: CPT | Mod: 25

## 2025-06-15 PROCEDURE — 99284 EMERGENCY DEPT VISIT MOD MDM: CPT | Mod: 25

## 2025-06-15 PROCEDURE — 85025 COMPLETE CBC W/AUTO DIFF WBC: CPT

## 2025-06-15 PROCEDURE — 74176 CT ABD & PELVIS W/O CONTRAST: CPT

## 2025-06-15 PROCEDURE — 83690 ASSAY OF LIPASE: CPT

## 2025-06-15 PROCEDURE — 82365 CALCULUS SPECTROSCOPY: CPT

## 2025-06-15 PROCEDURE — 87086 URINE CULTURE/COLONY COUNT: CPT

## 2025-06-15 PROCEDURE — 71045 X-RAY EXAM CHEST 1 VIEW: CPT | Mod: 26

## 2025-06-15 PROCEDURE — 80048 BASIC METABOLIC PNL TOTAL CA: CPT

## 2025-06-15 PROCEDURE — 72192 CT PELVIS W/O DYE: CPT

## 2025-06-15 PROCEDURE — 99239 HOSP IP/OBS DSCHRG MGMT >30: CPT

## 2025-06-15 PROCEDURE — 81001 URINALYSIS AUTO W/SCOPE: CPT

## 2025-06-15 PROCEDURE — 36415 COLL VENOUS BLD VENIPUNCTURE: CPT

## 2025-06-15 PROCEDURE — 96374 THER/PROPH/DIAG INJ IV PUSH: CPT

## 2025-06-15 RX ORDER — DABIGATRAN ETEXILATE MESYLATE 30 MG/1
1 PELLET ORAL
Qty: 0 | Refills: 0 | DISCHARGE

## 2025-06-15 RX ORDER — SULFAMETHOXAZOLE/TRIMETHOPRIM 800-160 MG
1 TABLET ORAL
Qty: 20 | Refills: 0
Start: 2025-06-15 | End: 2025-06-24

## 2025-06-15 RX ORDER — OXYCODONE HYDROCHLORIDE AND ACETAMINOPHEN 10; 325 MG/1; MG/1
1 TABLET ORAL
Qty: 10 | Refills: 0
Start: 2025-06-15 | End: 2025-06-19

## 2025-06-15 RX ORDER — SODIUM CHLORIDE 9 G/1000ML
1000 INJECTION, SOLUTION INTRAVENOUS
Refills: 0 | Status: DISCONTINUED | OUTPATIENT
Start: 2025-06-15 | End: 2025-06-15

## 2025-06-15 RX ADMIN — DABIGATRAN ETEXILATE MESYLATE 150 MILLIGRAM(S): 30 PELLET ORAL at 12:12

## 2025-06-15 RX ADMIN — DABIGATRAN ETEXILATE MESYLATE 150 MILLIGRAM(S): 30 PELLET ORAL at 00:33

## 2025-06-15 RX ADMIN — SODIUM CHLORIDE 120 MILLILITER(S): 9 INJECTION, SOLUTION INTRAVENOUS at 12:17

## 2025-06-15 NOTE — DISCHARGE NOTE NURSING/CASE MANAGEMENT/SOCIAL WORK - PATIENT PORTAL LINK FT
You can access the FollowMyHealth Patient Portal offered by [facility] by registering at the following website: [email or web address]. By joining [employer]’s FollowMyHealth portal, you will also be able to view your health information using other applications (apps) compatible with our system.

## 2025-06-15 NOTE — PROGRESS NOTE ADULT - SUBJECTIVE AND OBJECTIVE BOX
KATHRYN LLOYD  [id number]  06-15-25 @ 06:42      UROLOGY SERVICE: DAILY PROGRESS NOTE    Chief admitting complaint:  L ureteral stone, CRISTIANO, UTI    No acute events overnight.  No N/V/D/F.  Pain moderately controlled.      LABS:   14 Jun 2025 20:48    136    |  101    |  18     ----------------------------<  143    4.0     |  25     |  1.35     Ca    9.4        14 Jun 2025 20:48                            12.0   3.90  )-----------( 95       ( 15 Latrell 2025 06:15 )             36.3       I/O:  I&O's Summary    14 Jun 2025 07:01  -  15 Latrell 2025 06:42  --------------------------------------------------------  IN: 0 mL / OUT: 900 mL / NET: -900 mL        VITALS:  Vital Signs Last 24 Hrs  T(C): 36.4 (06-15-25 @ 05:11), Max: 36.9 (06-14-25 @ 20:37)  T(F): 97.5 (06-15-25 @ 05:11), Max: 98.4 (06-14-25 @ 20:37)  HR: 67 (06-15-25 @ 05:11) (65 - 86)  BP: 130/70 (06-15-25 @ 05:11) (130/70 - 189/52)  BP(mean): --  RR: 16 (06-15-25 @ 05:11) (16 - 19)  SpO2: 98% (06-15-25 @ 05:11) (93% - 99%)      PHYSICAL EXAM:    GEN: NAD, AAOx3  ABD: soft, NT/ND, no guarding or rigidity  : voiding  EXT: b/l LE with SCDS on  SKIN: No rashes, lesions, ulcerations

## 2025-06-15 NOTE — DISCHARGE NOTE PROVIDER - NSDCFUSCHEDAPPT_GEN_ALL_CORE_FT
Edmund German Physician [facility]  [facility] [address]  Scheduled Appointment: 09/10/2025

## 2025-06-15 NOTE — PATIENT PROFILE ADULT - INTERNATIONAL TRAVEL
Pt arrived for vidaza D5 and aranesp.  Pt tolerated vidaza x3 injections to left side of abd and Aranesp to left arm.  Pt discharged to home .  
No

## 2025-06-15 NOTE — DISCHARGE NOTE PROVIDER - CARE PROVIDER_API CALL
Sea Jurado  Urology  [address], [address]  [place] [address]  Phone: [phone]  Fax: [phone]  Follow Up Time:

## 2025-06-15 NOTE — DISCHARGE NOTE PROVIDER - NSDCCPCAREPLAN_GEN_ALL_CORE_FT
PRINCIPAL DISCHARGE DIAGNOSIS  Diagnosis: Left ureteral stone  Assessment and Plan of Treatment:       SECONDARY DISCHARGE DIAGNOSES  Diagnosis: CRISTIANO (acute kidney injury)  Assessment and Plan of Treatment:     Diagnosis: UTI (urinary tract infection)  Assessment and Plan of Treatment:

## 2025-06-15 NOTE — DISCHARGE NOTE NURSING/CASE MANAGEMENT/SOCIAL WORK - FINANCIAL ASSISTANCE
[facility] provides services at a reduced cost to those who are determined to be eligible through [facility]’s financial assistance program. Information regarding [facility]’s financial assistance program can be found by going to [email or web address] or by calling [phone].

## 2025-06-15 NOTE — DISCHARGE NOTE PROVIDER - NSDCMRMEDTOKEN_GEN_ALL_CORE_FT
dabigatran 150 mg oral capsule: 1 cap(s) orally once a day  Flomax 0.4 mg oral capsule: 1 cap(s) orally once a day (at bedtime)  rosuvastatin 5 mg oral tablet: 1 tab(s) orally once a day   Bactrim  mg-160 mg oral tablet: 1 tab(s) orally 2 times a day MDD: 2  dabigatran 150 mg oral capsule: 1 cap(s) orally once a day  Flomax 0.4 mg oral capsule: 1 cap(s) orally once a day (at bedtime)  rosuvastatin 5 mg oral tablet: 1 tab(s) orally once a day   Bactrim  mg-160 mg oral tablet: 1 tab(s) orally 2 times a day MDD: 2  dabigatran 150 mg oral capsule: 1 cap(s) orally once a day  rosuvastatin 5 mg oral tablet: 1 tab(s) orally once a day

## 2025-06-15 NOTE — DISCHARGE NOTE PROVIDER - NSDCFUADDINST_GEN_ALL_CORE_FT
General Discharge Instructions:  Use Tylenol for pain control as needed  Please resume all regular home medications unless specifically advised not to take a particular medication. Also, please take any new medications as prescribed.  Please get plenty of rest, continue to ambulate several times per day, and drink adequate amounts of fluids.      Warning Signs:  Please call your doctor if you experience the following:  *You experience new chest pain, pressure, squeezing or tightness.  *New or worsening cough, shortness of breath, or wheeze.  *If you are vomiting and cannot keep down fluids or your medications.  *You are getting dehydrated due to continued vomiting, diarrhea, or other reasons. Signs of dehydration include dry mouth, rapid heartbeat, or feeling dizzy or faint when standing.  *You see blood or dark/black material when you vomit or have a bowel movement.  *You experience burning when you urinate, have blood in your urine, or experience a discharge.  *Your pain is not improving within 8-12 hours or is not gone within 24 hours. Call or return immediately if your pain is getting worse, changes location, or moves to your chest or back.  *You have shaking chills, or fever greater than 100.4 degrees Fahrenheit.  *Any change in your symptoms, or any new symptoms that concern you    Resume all home medications.  General Discharge Instructions:  Use Tylenol for pain control as needed  Please resume all regular home medications unless specifically advised not to take a particular medication. Also, please take any new medications as prescribed.  Please get plenty of rest, continue to ambulate several times per day, and drink adequate amounts of fluids.      Warning Signs:  Please call your doctor if you experience the following:  *You experience new chest pain, pressure, squeezing or tightness.  *New or worsening cough, shortness of breath, or wheeze.  *If you are vomiting and cannot keep down fluids or your medications.  *You are getting dehydrated due to continued vomiting, diarrhea, or other reasons. Signs of dehydration include dry mouth, rapid heartbeat, or feeling dizzy or faint when standing.  *You see blood or dark/black material when you vomit or have a bowel movement.  *You experience burning when you urinate, have blood in your urine, or experience a discharge.  *Your pain is not improving within 8-12 hours or is not gone within 24 hours. Call or return immediately if your pain is getting worse, changes location, or moves to your chest or back.  *You have shaking chills, or fever greater than 100.4 degrees Fahrenheit.  *Any change in your symptoms, or any new symptoms that concern you    Resume all home medications.   Take antibiotic bactrim as prescribed. Begin tomorrow 6/16 and complete 10 day course.   Follow up with your urologist or with Dr. Jurado in 1 week.

## 2025-06-15 NOTE — DISCHARGE NOTE PROVIDER - HOSPITAL COURSE
74 y.o. M patient presenting to ER with severe, 10/10 left lower back pain radiating to abdomen.  He was seen 2 days ago for similar complaint and found to have a left 4mm distal ureteral stone.  No fevers or chill.  No dysuria.  Mild hematuria (described urine as yellow with small strands blood).  Prior history of stones.    Cr elevated to 1.35 from 1.03. WBC WNL.  U/A: + mod blood, +nitrite, large alyssa    Patient admitted for observation and placed NPO/IVF's. Straining urine. Patient felt better. Repeat CT pelvis:   D/C'd home to f/u as outpt.    74 y.o. M patient presenting to ER with severe, 10/10 left lower back pain radiating to abdomen.  He was seen 2 days ago for similar complaint and found to have a left 4mm distal ureteral stone.  No fevers or chill.  No dysuria.  Mild hematuria (described urine as yellow with small strands blood).  Prior history of stones.    Cr elevated to 1.35 from 1.03. WBC WNL.  U/A: + mod blood, +nitrite, large alyssa    Patient admitted for observation and placed NPO/IVF's. Straining urine. Patient felt better. Repeat CT pelvis:   D/C'd home to f/u as outpt with course of antibiotics given UA	.    74 y.o. M patient presenting to ER with severe, 10/10 left lower back pain radiating to abdomen.  He was seen 2 days ago for similar complaint and found to have a left 4mm distal ureteral stone.  No fevers or chill.  No dysuria.  Mild hematuria (described urine as yellow with small strands blood).  Prior history of stones.    Cr elevated to 1.35 from 1.03. WBC WNL.  U/A: + mod blood, +nitrite, large alyssa    Patient admitted for observation and placed NPO/IVF's. Straining urine. Patient felt better. Repeat CT pelvis: passed stone  D/C'd home to f/u as outpt with course of antibiotics given UA	.

## 2025-06-16 DIAGNOSIS — N20.0 CALCULUS OF KIDNEY: ICD-10-CM

## 2025-06-16 DIAGNOSIS — I48.0 PAROXYSMAL ATRIAL FIBRILLATION: ICD-10-CM

## 2025-06-16 DIAGNOSIS — E78.00 PURE HYPERCHOLESTEROLEMIA, UNSPECIFIED: ICD-10-CM

## 2025-06-16 DIAGNOSIS — Z88.8 ALLERGY STATUS TO OTHER DRUGS, MEDICAMENTS AND BIOLOGICAL SUBSTANCES: ICD-10-CM

## 2025-06-16 DIAGNOSIS — Z79.01 LONG TERM (CURRENT) USE OF ANTICOAGULANTS: ICD-10-CM

## 2025-06-16 DIAGNOSIS — Z88.0 ALLERGY STATUS TO PENICILLIN: ICD-10-CM

## 2025-06-16 DIAGNOSIS — K58.9 IRRITABLE BOWEL SYNDROME, UNSPECIFIED: ICD-10-CM

## 2025-06-16 DIAGNOSIS — Z95.0 PRESENCE OF CARDIAC PACEMAKER: ICD-10-CM

## 2025-06-26 DIAGNOSIS — Z95.0 PRESENCE OF CARDIAC PACEMAKER: ICD-10-CM

## 2025-06-26 DIAGNOSIS — Z79.899 OTHER LONG TERM (CURRENT) DRUG THERAPY: ICD-10-CM

## 2025-06-26 DIAGNOSIS — N20.1 CALCULUS OF URETER: ICD-10-CM

## 2025-06-26 DIAGNOSIS — I48.20 CHRONIC ATRIAL FIBRILLATION, UNSPECIFIED: ICD-10-CM

## 2025-06-26 DIAGNOSIS — Z91.048 OTHER NONMEDICINAL SUBSTANCE ALLERGY STATUS: ICD-10-CM

## 2025-06-26 DIAGNOSIS — E78.5 HYPERLIPIDEMIA, UNSPECIFIED: ICD-10-CM

## 2025-06-26 DIAGNOSIS — N20.0 CALCULUS OF KIDNEY: ICD-10-CM

## 2025-06-26 DIAGNOSIS — N17.9 ACUTE KIDNEY FAILURE, UNSPECIFIED: ICD-10-CM

## 2025-06-26 DIAGNOSIS — K58.9 IRRITABLE BOWEL SYNDROME, UNSPECIFIED: ICD-10-CM

## 2025-06-26 DIAGNOSIS — Z79.01 LONG TERM (CURRENT) USE OF ANTICOAGULANTS: ICD-10-CM

## 2025-06-26 DIAGNOSIS — N39.0 URINARY TRACT INFECTION, SITE NOT SPECIFIED: ICD-10-CM

## 2025-06-26 NOTE — ED ADULT NURSE NOTE - OBJECTIVE STATEMENT
Robles Casas  1959   Chief Complaint   Patient presents with    Shoulder Pain     Rt         HISTORY OF PRESENT ILLNESS  Robles Casas is a 66 y.o. male who presents today for evaluation of right shoulder pain. Pain has been present for 2 months. 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 Pain is keeping him awake at night. Pain radiates up to the neck.  Pain is a 0/10.   Has tried following treatments: Injections:No; Brace:No; Therapy:No; Cane/Crutch:No      Allergies   Allergen Reactions    Aspirin      Other reaction(s): Unknown (comments)    Bismuth Subsalicylate Hives    Hydrocodone-Acetaminophen      Other reaction(s): Unknown (comments)    Morphine Hives    Nsaids Hives    Prednisone Nausea Only        Past Medical History:   Diagnosis Date    High cholesterol       Social History       Tobacco History       Smoking Status  Every Day Current Packs/Day  0.5 packs/day Smoking Tobacco Type  Cigarettes   Pack Year History     Packs/Day From To Years    0.5   0.0      Smokeless Tobacco Use  Never              Alcohol History       Alcohol Use Status  Yes              Drug Use       Drug Use Status  No              Sexual Activity       Sexually Active  Yes                   No past surgical history on file.   Family History   Problem Relation Age of Onset    No Known Problems Mother     No Known Problems Father     No Known Problems Sister     No Known Problems Brother     No Known Problems Maternal Aunt     No Known Problems Maternal Uncle     No Known Problems Paternal Aunt     No Known Problems Paternal Uncle     No Known Problems Maternal Grandmother     No Known Problems Maternal Grandfather     No Known Problems Paternal Grandmother     No Known Problems Paternal Grandfather     No Known Problems Other      Current Outpatient Medications   Medication Sig    amLODIPine (NORVASC) 5 MG tablet Take 1 tablet by mouth once daily     No 
Received ambulatory with steady gait  with chief complaints of worsening L flank pain x today. Pt states "I forgot to take my Flomax dose today. I noticed my urine is a lot darker. I took 2 tabs of Percocet about 2 hours ago."    Patient AOX4, speaking full sentences, family @  bedside. Patient denies chest pain, shortness of breath, difficulty breathing and any form of distress not noted.  Resps even and nonlabored. Moves all extremities. No obvious trauma/injury/deformity noted. Patient oriented to ED area. All needs attended. POC reviewed. Pt placed in gown. Purposeful proactive hourly rounding in progress.

## 2025-06-27 LAB
CELL MATERIAL STONE EST-MCNT: SIGNIFICANT CHANGE UP
NIDUS STONE QN: SIGNIFICANT CHANGE UP

## 2025-07-21 ENCOUNTER — NON-APPOINTMENT (OUTPATIENT)
Age: 75
End: 2025-07-21

## 2025-07-24 ENCOUNTER — OUTPATIENT (OUTPATIENT)
Dept: OUTPATIENT SERVICES | Facility: HOSPITAL | Age: 75
LOS: 1 days | End: 2025-07-24
Payer: MEDICARE

## 2025-07-24 DIAGNOSIS — Z98.890 OTHER SPECIFIED POSTPROCEDURAL STATES: Chronic | ICD-10-CM

## 2025-07-24 DIAGNOSIS — I44.2 ATRIOVENTRICULAR BLOCK, COMPLETE: ICD-10-CM

## 2025-07-24 DIAGNOSIS — Z98.89 OTHER SPECIFIED POSTPROCEDURAL STATES: Chronic | ICD-10-CM

## 2025-07-24 PROBLEM — I48.20 CHRONIC ATRIAL FIBRILLATION, UNSPECIFIED: Chronic | Status: ACTIVE | Noted: 2025-06-14

## 2025-07-24 PROBLEM — Z95.0 PRESENCE OF CARDIAC PACEMAKER: Chronic | Status: ACTIVE | Noted: 2025-06-14

## 2025-07-24 PROCEDURE — 93005 ELECTROCARDIOGRAM TRACING: CPT

## 2025-07-24 PROCEDURE — 93010 ELECTROCARDIOGRAM REPORT: CPT

## 2025-07-30 NOTE — DISCUSSION/SUMMARY
[Pacemaker Function Normal] : normal pacemaker function [FreeTextEntry1] : 73 year old male with arthritis, hepatitis C with residual cirrhosis and complete heart block s/p PPM 12/2017, who presents for follow up.  Device interrogation reveals normal function. All measured data is within normal limits. No recent AF (since last reset) and he remains on Eliquis. He is pacemaker-dependent, but has a stable ventricular escape rhythm.  Recommend yearly echocardiograms.  Follow up in 6 months or sooner if needed.  He knows to call with any questions or concerns.  negative...

## 2025-09-10 ENCOUNTER — APPOINTMENT (OUTPATIENT)
Dept: HEART AND VASCULAR | Facility: CLINIC | Age: 75
End: 2025-09-10
Payer: MEDICARE

## 2025-09-10 ENCOUNTER — NON-APPOINTMENT (OUTPATIENT)
Age: 75
End: 2025-09-10

## 2025-09-10 VITALS
SYSTOLIC BLOOD PRESSURE: 129 MMHG | OXYGEN SATURATION: 97 % | BODY MASS INDEX: 23.31 KG/M2 | HEART RATE: 98 BPM | HEIGHT: 69.5 IN | WEIGHT: 161 LBS | TEMPERATURE: 98.2 F | DIASTOLIC BLOOD PRESSURE: 76 MMHG

## 2025-09-10 DIAGNOSIS — I44.2 ATRIOVENTRICULAR BLOCK, COMPLETE: ICD-10-CM

## 2025-09-10 PROCEDURE — 93280 PM DEVICE PROGR EVAL DUAL: CPT

## 2025-09-10 PROCEDURE — 99212 OFFICE O/P EST SF 10 MIN: CPT | Mod: 25

## (undated) DEVICE — PACK GENERAL LAPAROSCOPY

## (undated) DEVICE — SUT VICRYL 0 27" UR-6

## (undated) DEVICE — SUT MONOCRYL 4-0 18" PS-2

## (undated) DEVICE — POSITIONER FOAM EGG CRATE ULNAR 2PCS (PINK)

## (undated) DEVICE — D HELP - CLEARVIEW CLEARIFY SYSTEM

## (undated) DEVICE — ENDOCATCH 10MM SPECIMEN POUCH

## (undated) DEVICE — WARMING BLANKET UPPER ADULT

## (undated) DEVICE — TUBING STRYKER PNEUMOSURE HI FLOW INSUFFLATOR

## (undated) DEVICE — DRSG DERMABOND 0.7ML

## (undated) DEVICE — SOL IRR BAG NS 0.9% 3000ML

## (undated) DEVICE — GLV 7.5 PROTEXIS (WHITE)

## (undated) DEVICE — TROCAR COVIDIEN VERSAPORT BLADELESS OPTICAL 5MM STANDARD

## (undated) DEVICE — VENODYNE/SCD SLEEVE CALF MEDIUM

## (undated) DEVICE — SUT VICRYL 0 54" TIES

## (undated) DEVICE — MONOPOLAR CORD HI FREQ DISPOSABLE

## (undated) DEVICE — TROCAR COVIDIEN VERSAONE FIXATION CANNULA 5MM

## (undated) DEVICE — TROCAR COVIDIEN VERSAONE BLUNT TIP HASSAN 12MM

## (undated) DEVICE — LIGASURE MARYLAND 37CM

## (undated) DEVICE — Device

## (undated) DEVICE — TIP METZENBAUM SCISSOR MONOPOLAR ENDOCUT (ORANGE)